# Patient Record
Sex: FEMALE | Race: BLACK OR AFRICAN AMERICAN | Employment: FULL TIME | URBAN - METROPOLITAN AREA
[De-identification: names, ages, dates, MRNs, and addresses within clinical notes are randomized per-mention and may not be internally consistent; named-entity substitution may affect disease eponyms.]

---

## 2017-03-17 DIAGNOSIS — N92.6 IRREGULAR MENSES: ICD-10-CM

## 2017-03-20 RX ORDER — ETHINYL ESTRADIOL AND LEVONORGESTREL 150-30(84)
KIT ORAL
Qty: 91 TABLET | Refills: 0 | OUTPATIENT
Start: 2017-03-20

## 2018-11-06 ENCOUNTER — TELEPHONE (OUTPATIENT)
Dept: INTERNAL MEDICINE CLINIC | Age: 38
End: 2018-11-06

## 2018-11-07 ENCOUNTER — TELEPHONE (OUTPATIENT)
Dept: INTERNAL MEDICINE CLINIC | Age: 38
End: 2018-11-07

## 2018-11-08 ENCOUNTER — OFFICE VISIT (OUTPATIENT)
Dept: INTERNAL MEDICINE CLINIC | Age: 38
End: 2018-11-08
Payer: COMMERCIAL

## 2018-11-08 VITALS
TEMPERATURE: 98.3 F | BODY MASS INDEX: 30.7 KG/M2 | DIASTOLIC BLOOD PRESSURE: 76 MMHG | OXYGEN SATURATION: 99 % | WEIGHT: 162.6 LBS | HEIGHT: 61 IN | HEART RATE: 77 BPM | SYSTOLIC BLOOD PRESSURE: 128 MMHG

## 2018-11-08 DIAGNOSIS — E78.5 DYSLIPIDEMIA: ICD-10-CM

## 2018-11-08 DIAGNOSIS — N91.2 AMENORRHEA: ICD-10-CM

## 2018-11-08 DIAGNOSIS — J45.20 MILD INTERMITTENT ASTHMA WITHOUT COMPLICATION: ICD-10-CM

## 2018-11-08 DIAGNOSIS — E55.9 VITAMIN D DEFICIENCY: ICD-10-CM

## 2018-11-08 DIAGNOSIS — R07.89 OTHER CHEST PAIN: Primary | ICD-10-CM

## 2018-11-08 DIAGNOSIS — I10 ESSENTIAL HYPERTENSION: ICD-10-CM

## 2018-11-08 DIAGNOSIS — Z34.90 PREGNANCY, UNSPECIFIED GESTATIONAL AGE: ICD-10-CM

## 2018-11-08 DIAGNOSIS — J30.89 OTHER ALLERGIC RHINITIS: ICD-10-CM

## 2018-11-08 DIAGNOSIS — G44.89 OTHER HEADACHE SYNDROME: ICD-10-CM

## 2018-11-08 DIAGNOSIS — R53.83 OTHER FATIGUE: ICD-10-CM

## 2018-11-08 LAB
A/G RATIO: 1.9 (ref 1.1–2.2)
ALBUMIN SERPL-MCNC: 4.6 G/DL (ref 3.4–5)
ALP BLD-CCNC: 44 U/L (ref 40–129)
ALT SERPL-CCNC: 10 U/L (ref 10–40)
ANION GAP SERPL CALCULATED.3IONS-SCNC: 11 MMOL/L (ref 3–16)
AST SERPL-CCNC: 12 U/L (ref 15–37)
BASOPHILS ABSOLUTE: 0 K/UL (ref 0–0.2)
BASOPHILS RELATIVE PERCENT: 0.4 %
BILIRUB SERPL-MCNC: 0.3 MG/DL (ref 0–1)
BILIRUBIN, POC: NORMAL
BLOOD URINE, POC: NORMAL
BUN BLDV-MCNC: 5 MG/DL (ref 7–20)
CALCIUM SERPL-MCNC: 9.7 MG/DL (ref 8.3–10.6)
CHLORIDE BLD-SCNC: 109 MMOL/L (ref 99–110)
CHOLESTEROL, TOTAL: 160 MG/DL (ref 0–199)
CLARITY, POC: NORMAL
CO2: 19 MMOL/L (ref 21–32)
COLOR, POC: YELLOW
CONTROL: NORMAL
CREAT SERPL-MCNC: <0.5 MG/DL (ref 0.6–1.1)
CREATININE URINE: 125.2 MG/DL (ref 28–259)
EOSINOPHILS ABSOLUTE: 0.2 K/UL (ref 0–0.6)
EOSINOPHILS RELATIVE PERCENT: 1.6 %
GFR AFRICAN AMERICAN: >60
GFR NON-AFRICAN AMERICAN: >60
GLOBULIN: 2.4 G/DL
GLUCOSE BLD-MCNC: 80 MG/DL (ref 70–99)
GLUCOSE URINE, POC: NORMAL
GONADOTROPIN, CHORIONIC (HCG) QUANT: NORMAL MIU/ML
HCT VFR BLD CALC: 37.3 % (ref 36–48)
HDLC SERPL-MCNC: 49 MG/DL (ref 40–60)
HEMOGLOBIN: 12.6 G/DL (ref 12–16)
KETONES, POC: NORMAL
LDL CHOLESTEROL CALCULATED: 101 MG/DL
LEUKOCYTE EST, POC: NORMAL
LYMPHOCYTES ABSOLUTE: 2.3 K/UL (ref 1–5.1)
LYMPHOCYTES RELATIVE PERCENT: 21.9 %
MCH RBC QN AUTO: 32.7 PG (ref 26–34)
MCHC RBC AUTO-ENTMCNC: 33.9 G/DL (ref 31–36)
MCV RBC AUTO: 96.3 FL (ref 80–100)
MICROALBUMIN UR-MCNC: <1.2 MG/DL
MICROALBUMIN/CREAT UR-RTO: NORMAL MG/G (ref 0–30)
MONOCYTES ABSOLUTE: 0.9 K/UL (ref 0–1.3)
MONOCYTES RELATIVE PERCENT: 8.6 %
NEUTROPHILS ABSOLUTE: 7.2 K/UL (ref 1.7–7.7)
NEUTROPHILS RELATIVE PERCENT: 67.5 %
NITRITE, POC: NORMAL
PDW BLD-RTO: 13.2 % (ref 12.4–15.4)
PH, POC: 7
PLATELET # BLD: 424 K/UL (ref 135–450)
PMV BLD AUTO: 7.2 FL (ref 5–10.5)
POTASSIUM SERPL-SCNC: 4 MMOL/L (ref 3.5–5.1)
PREGNANCY TEST URINE, POC: POSITIVE
PROTEIN, POC: NORMAL
RBC # BLD: 3.87 M/UL (ref 4–5.2)
SODIUM BLD-SCNC: 139 MMOL/L (ref 136–145)
SPECIFIC GRAVITY, POC: 1.01
TOTAL PROTEIN: 7 G/DL (ref 6.4–8.2)
TRIGL SERPL-MCNC: 50 MG/DL (ref 0–150)
TSH REFLEX: 0.32 UIU/ML (ref 0.27–4.2)
UROBILINOGEN, POC: 0.2
VLDLC SERPL CALC-MCNC: 10 MG/DL
WBC # BLD: 10.7 K/UL (ref 4–11)

## 2018-11-08 PROCEDURE — 81025 URINE PREGNANCY TEST: CPT | Performed by: INTERNAL MEDICINE

## 2018-11-08 PROCEDURE — 93000 ELECTROCARDIOGRAM COMPLETE: CPT | Performed by: INTERNAL MEDICINE

## 2018-11-08 PROCEDURE — 81002 URINALYSIS NONAUTO W/O SCOPE: CPT | Performed by: INTERNAL MEDICINE

## 2018-11-08 PROCEDURE — 99215 OFFICE O/P EST HI 40 MIN: CPT | Performed by: INTERNAL MEDICINE

## 2018-11-08 RX ORDER — TETANUS AND DIPHTHERIA TOXOIDS ADSORBED 2; 2 [LF]/.5ML; [LF]/.5ML
0.5 INJECTION INTRAMUSCULAR ONCE
Qty: 0.5 ML | Refills: 0 | Status: CANCELLED | OUTPATIENT
Start: 2018-11-08 | End: 2018-11-08

## 2018-11-08 ASSESSMENT — PATIENT HEALTH QUESTIONNAIRE - PHQ9
SUM OF ALL RESPONSES TO PHQ QUESTIONS 1-9: 0
SUM OF ALL RESPONSES TO PHQ9 QUESTIONS 1 & 2: 0
1. LITTLE INTEREST OR PLEASURE IN DOING THINGS: 0
2. FEELING DOWN, DEPRESSED OR HOPELESS: 0
SUM OF ALL RESPONSES TO PHQ QUESTIONS 1-9: 0

## 2018-11-09 LAB
FOLATE: 17.1 NG/ML (ref 4.78–24.2)
VITAMIN B-12: 579 PG/ML (ref 211–911)
VITAMIN D 25-HYDROXY: 14 NG/ML

## 2018-11-13 ENCOUNTER — TELEPHONE (OUTPATIENT)
Dept: OBGYN CLINIC | Age: 38
End: 2018-11-13

## 2018-11-13 ENCOUNTER — OFFICE VISIT (OUTPATIENT)
Dept: OBGYN CLINIC | Age: 38
End: 2018-11-13
Payer: COMMERCIAL

## 2018-11-13 VITALS
BODY MASS INDEX: 30.09 KG/M2 | HEIGHT: 61 IN | SYSTOLIC BLOOD PRESSURE: 128 MMHG | TEMPERATURE: 97.8 F | HEART RATE: 74 BPM | WEIGHT: 159.4 LBS | DIASTOLIC BLOOD PRESSURE: 74 MMHG

## 2018-11-13 DIAGNOSIS — I10 HYPERTENSION, UNSPECIFIED TYPE: ICD-10-CM

## 2018-11-13 DIAGNOSIS — J45.20 MILD INTERMITTENT ASTHMA WITHOUT COMPLICATION: ICD-10-CM

## 2018-11-13 DIAGNOSIS — Z12.4 PAP SMEAR FOR CERVICAL CANCER SCREENING: ICD-10-CM

## 2018-11-13 DIAGNOSIS — N91.2 AMENORRHEA: Primary | ICD-10-CM

## 2018-11-13 DIAGNOSIS — Z01.419 WOMEN'S ANNUAL ROUTINE GYNECOLOGICAL EXAMINATION: Primary | ICD-10-CM

## 2018-11-13 DIAGNOSIS — N91.2 AMENORRHEA: ICD-10-CM

## 2018-11-13 LAB
BILIRUBIN URINE: NEGATIVE
BLOOD, URINE: NEGATIVE
CLARITY: CLEAR
COLOR: YELLOW
CONTROL: NORMAL
CRL: NORMAL CM
EPITHELIAL CELLS, UA: 2 /HPF (ref 0–5)
GLUCOSE URINE: NEGATIVE MG/DL
HYALINE CASTS: 2 /LPF (ref 0–8)
KETONES, URINE: NEGATIVE MG/DL
LEUKOCYTE ESTERASE, URINE: NEGATIVE
MICROSCOPIC EXAMINATION: NORMAL
NITRITE, URINE: NEGATIVE
PH UA: 7
PREGNANCY TEST URINE, POC: POSITIVE
PROTEIN UA: NEGATIVE MG/DL
RBC UA: 3 /HPF (ref 0–4)
SAC DIAMETER: NORMAL CM
SPECIFIC GRAVITY UA: 1.01
UROBILINOGEN, URINE: 1 E.U./DL
WBC UA: 1 /HPF (ref 0–5)

## 2018-11-13 PROCEDURE — 81001 URINALYSIS AUTO W/SCOPE: CPT | Performed by: OBSTETRICS & GYNECOLOGY

## 2018-11-13 PROCEDURE — 76801 OB US < 14 WKS SINGLE FETUS: CPT | Performed by: OBSTETRICS & GYNECOLOGY

## 2018-11-13 PROCEDURE — 81025 URINE PREGNANCY TEST: CPT | Performed by: OBSTETRICS & GYNECOLOGY

## 2018-11-13 PROCEDURE — 99395 PREV VISIT EST AGE 18-39: CPT | Performed by: OBSTETRICS & GYNECOLOGY

## 2018-11-13 RX ORDER — PNV NO.95/FERROUS FUM/FOLIC AC 28MG-0.8MG
1 TABLET ORAL DAILY
Qty: 90 CAPSULE | Refills: 3 | Status: CANCELLED | OUTPATIENT
Start: 2018-11-13

## 2018-11-14 LAB
CANDIDA SPECIES, DNA PROBE: ABNORMAL
GARDNERELLA VAGINALIS, DNA PROBE: ABNORMAL
TRICHOMONAS VAGINALIS DNA: ABNORMAL

## 2018-11-15 LAB
C TRACH DNA GENITAL QL NAA+PROBE: NEGATIVE
HPV COMMENT: NORMAL
HPV TYPE 16: NOT DETECTED
HPV TYPE 18: NOT DETECTED
HPVOH (OTHER TYPES): NOT DETECTED
N. GONORRHOEAE DNA: NEGATIVE
URINE CULTURE, ROUTINE: NORMAL

## 2018-11-18 RX ORDER — PNV NO.95/FERROUS FUM/FOLIC AC 28MG-0.8MG
1 TABLET ORAL DAILY
Qty: 90 CAPSULE | Refills: 3 | Status: SHIPPED | OUTPATIENT
Start: 2018-11-18 | End: 2019-09-04 | Stop reason: ALTCHOICE

## 2018-11-18 ASSESSMENT — ENCOUNTER SYMPTOMS
SHORTNESS OF BREATH: 0
CONSTIPATION: 0
NAUSEA: 1
ABDOMINAL DISTENTION: 0
DIARRHEA: 0
VOMITING: 0
ABDOMINAL PAIN: 0

## 2018-11-19 ENCOUNTER — TELEPHONE (OUTPATIENT)
Dept: OBGYN CLINIC | Age: 38
End: 2018-11-19

## 2018-11-19 DIAGNOSIS — I10 HYPERTENSION, UNSPECIFIED TYPE: Primary | ICD-10-CM

## 2018-11-19 RX ORDER — BLOOD PRESSURE TEST KIT
1 KIT MISCELLANEOUS 2 TIMES DAILY
Qty: 1 KIT | Refills: 0 | Status: ON HOLD | OUTPATIENT
Start: 2018-11-19 | End: 2019-06-03

## 2018-12-04 RX ORDER — METHYLDOPA 500 MG/1
500 TABLET, FILM COATED ORAL 2 TIMES DAILY
Qty: 60 TABLET | Refills: 1 | Status: SHIPPED | OUTPATIENT
Start: 2018-12-04 | End: 2019-04-15

## 2018-12-04 NOTE — TELEPHONE ENCOUNTER
ALDOMET IS APPROPRIATE IN VIEW OF SAFETY PROFILE IN PREGNANCY    ADVISE LOW SODIUM / DASH DIET IF OK WITH YOU (OB)  WILL HAVE PT CONTINUE MGT WITH OB DURING PREGNANCY AND POSTPARTUM

## 2018-12-11 ENCOUNTER — INITIAL PRENATAL (OUTPATIENT)
Dept: OBGYN CLINIC | Age: 38
End: 2018-12-11
Payer: COMMERCIAL

## 2018-12-11 VITALS
WEIGHT: 162.6 LBS | BODY MASS INDEX: 30.72 KG/M2 | SYSTOLIC BLOOD PRESSURE: 120 MMHG | HEART RATE: 82 BPM | DIASTOLIC BLOOD PRESSURE: 72 MMHG

## 2018-12-11 DIAGNOSIS — O10.919 PRE-EXISTING HYPERTENSION DURING PREGNANCY, ANTEPARTUM, UNSPECIFIED PRE-EXISTING HYPERTENSION TYPE: ICD-10-CM

## 2018-12-11 DIAGNOSIS — O09.511 ELDERLY PRIMIGRAVIDA IN FIRST TRIMESTER: ICD-10-CM

## 2018-12-11 DIAGNOSIS — I10 ESSENTIAL HYPERTENSION: ICD-10-CM

## 2018-12-11 DIAGNOSIS — J45.20 MILD INTERMITTENT ASTHMA WITHOUT COMPLICATION: ICD-10-CM

## 2018-12-11 DIAGNOSIS — Z34.01 ENCOUNTER FOR PRENATAL CARE IN FIRST TRIMESTER OF FIRST PREGNANCY: Primary | ICD-10-CM

## 2018-12-11 LAB
A/G RATIO: 1.5 (ref 1.1–2.2)
ABO/RH: NORMAL
ALBUMIN SERPL-MCNC: 4.1 G/DL (ref 3.4–5)
ALP BLD-CCNC: 40 U/L (ref 40–129)
ALT SERPL-CCNC: 10 U/L (ref 10–40)
AMPHETAMINE SCREEN, URINE: ABNORMAL
ANION GAP SERPL CALCULATED.3IONS-SCNC: 15 MMOL/L (ref 3–16)
ANTIBODY SCREEN: NORMAL
AST SERPL-CCNC: 13 U/L (ref 15–37)
BARBITURATE SCREEN URINE: ABNORMAL
BASOPHILS ABSOLUTE: 0 K/UL (ref 0–0.2)
BASOPHILS RELATIVE PERCENT: 0.2 %
BENZODIAZEPINE SCREEN, URINE: ABNORMAL
BILIRUB SERPL-MCNC: <0.2 MG/DL (ref 0–1)
BUN BLDV-MCNC: 6 MG/DL (ref 7–20)
BUPRENORPHINE URINE: ABNORMAL
CALCIUM SERPL-MCNC: 9.7 MG/DL (ref 8.3–10.6)
CANNABINOID SCREEN URINE: POSITIVE
CHLORIDE BLD-SCNC: 104 MMOL/L (ref 99–110)
CO2: 19 MMOL/L (ref 21–32)
COCAINE METABOLITE SCREEN URINE: ABNORMAL
CREAT SERPL-MCNC: <0.5 MG/DL (ref 0.6–1.1)
EOSINOPHILS ABSOLUTE: 0.2 K/UL (ref 0–0.6)
EOSINOPHILS RELATIVE PERCENT: 1.6 %
GFR AFRICAN AMERICAN: >60
GFR NON-AFRICAN AMERICAN: >60
GLOBULIN: 2.8 G/DL
GLUCOSE BLD-MCNC: 77 MG/DL (ref 70–99)
HCT VFR BLD CALC: 35 % (ref 36–48)
HEMOGLOBIN: 11.7 G/DL (ref 12–16)
LYMPHOCYTES ABSOLUTE: 2.1 K/UL (ref 1–5.1)
LYMPHOCYTES RELATIVE PERCENT: 22.4 %
Lab: ABNORMAL
MCH RBC QN AUTO: 32.4 PG (ref 26–34)
MCHC RBC AUTO-ENTMCNC: 33.6 G/DL (ref 31–36)
MCV RBC AUTO: 96.4 FL (ref 80–100)
METHADONE SCREEN, URINE: ABNORMAL
MONOCYTES ABSOLUTE: 0.5 K/UL (ref 0–1.3)
MONOCYTES RELATIVE PERCENT: 5.7 %
NEUTROPHILS ABSOLUTE: 6.7 K/UL (ref 1.7–7.7)
NEUTROPHILS RELATIVE PERCENT: 70.1 %
OPIATE SCREEN URINE: ABNORMAL
OXYCODONE URINE: ABNORMAL
PDW BLD-RTO: 12.7 % (ref 12.4–15.4)
PH UA: 6.5
PHENCYCLIDINE SCREEN URINE: ABNORMAL
PLATELET # BLD: 386 K/UL (ref 135–450)
PMV BLD AUTO: 7.1 FL (ref 5–10.5)
POTASSIUM SERPL-SCNC: 3.9 MMOL/L (ref 3.5–5.1)
PROPOXYPHENE SCREEN: ABNORMAL
RBC # BLD: 3.63 M/UL (ref 4–5.2)
SODIUM BLD-SCNC: 138 MMOL/L (ref 136–145)
TOTAL PROTEIN: 6.9 G/DL (ref 6.4–8.2)
WBC # BLD: 9.5 K/UL (ref 4–11)

## 2018-12-11 PROCEDURE — 0501F PRENATAL FLOW SHEET: CPT | Performed by: OBSTETRICS & GYNECOLOGY

## 2018-12-11 PROCEDURE — 36415 COLL VENOUS BLD VENIPUNCTURE: CPT | Performed by: OBSTETRICS & GYNECOLOGY

## 2018-12-12 LAB
HEPATITIS B SURFACE ANTIGEN INTERPRETATION: NORMAL
HIV AG/AB: NORMAL
HIV ANTIGEN: NORMAL
HIV-1 ANTIBODY: NORMAL
HIV-2 AB: NORMAL
RUBELLA ANTIBODY IGG: 41.5 IU/ML
SICKLE CELL SCREEN: NEGATIVE
T3 TOTAL: 1.26 NG/ML (ref 0.8–2)
T4 FREE: 1.2 NG/DL (ref 0.9–1.8)
TOTAL SYPHILLIS IGG/IGM: NORMAL
TSH REFLEX: 0.19 UIU/ML (ref 0.27–4.2)

## 2018-12-17 DIAGNOSIS — Z34.01 ENCOUNTER FOR PRENATAL CARE IN FIRST TRIMESTER OF FIRST PREGNANCY: ICD-10-CM

## 2018-12-17 DIAGNOSIS — I10 ESSENTIAL HYPERTENSION: ICD-10-CM

## 2018-12-17 PROBLEM — O10.919 PRE-EXISTING HYPERTENSION DURING PREGNANCY, ANTEPARTUM: Status: ACTIVE | Noted: 2018-12-17

## 2018-12-17 PROBLEM — O09.511 ELDERLY PRIMIGRAVIDA IN FIRST TRIMESTER: Status: ACTIVE | Noted: 2018-12-17

## 2018-12-18 LAB
ALT SERPL-CCNC: 8 U/L (ref 10–40)
AST SERPL-CCNC: 10 U/L (ref 15–37)
BASOPHILS ABSOLUTE: 0 K/UL (ref 0–0.2)
BASOPHILS RELATIVE PERCENT: 0.4 %
BUN BLDV-MCNC: 6 MG/DL (ref 7–20)
CHLORIDE BLD-SCNC: 102 MMOL/L (ref 99–110)
CO2: 18 MMOL/L (ref 21–32)
CREAT SERPL-MCNC: 0.5 MG/DL (ref 0.6–1.2)
EOSINOPHILS ABSOLUTE: 0.3 K/UL (ref 0–0.6)
EOSINOPHILS RELATIVE PERCENT: 2.5 %
HCT VFR BLD CALC: 36 % (ref 36–48)
HEMOGLOBIN: 12 G/DL (ref 12–16)
LYMPHOCYTES ABSOLUTE: 2.3 K/UL (ref 1–5.1)
LYMPHOCYTES RELATIVE PERCENT: 20.9 %
MCH RBC QN AUTO: 31.9 PG (ref 26–34)
MCHC RBC AUTO-ENTMCNC: 33.3 G/DL (ref 31–36)
MCV RBC AUTO: 95.9 FL (ref 80–100)
MONOCYTES ABSOLUTE: 0.6 K/UL (ref 0–1.3)
MONOCYTES RELATIVE PERCENT: 5.5 %
NEUTROPHILS ABSOLUTE: 7.8 K/UL (ref 1.7–7.7)
NEUTROPHILS RELATIVE PERCENT: 70.7 %
PDW BLD-RTO: 12.8 % (ref 12.4–15.4)
PLATELET # BLD: 394 K/UL (ref 135–450)
PMV BLD AUTO: 7.1 FL (ref 5–10.5)
POTASSIUM SERPL-SCNC: 3.8 MMOL/L (ref 3.5–5.1)
RBC # BLD: 3.75 M/UL (ref 4–5.2)
SODIUM BLD-SCNC: 137 MMOL/L (ref 136–145)
WBC # BLD: 11 K/UL (ref 4–11)

## 2018-12-18 NOTE — PROGRESS NOTES
No vaginal bleeding, no LOF, no vaginal discharge, no pelvic pain, +FM  Patient started on Aldomet 500 mg Q 12 hours since last visit. Checking blood pressures twice daily. 110's-130's/70's-80's  Blood pressures have improved since starting Aldomet. Denies headache, vision changes and RUQ pain. Would like to proceed with free fetal DNA testing. Risks and benefits discussed. Diagnosis Orders   1. Encounter for prenatal care in first trimester of first pregnancy  Drug Screen Multi Urine With Bup    Type and Screen    CBC Auto Differential    Rubella antibody, IgG    Syphilis Antibody Cascading Reflex    Hepatitis B Surface Antigen    TSH with Reflex    HIV Screen    COMPREHENSIVE METABOLIC PANEL    ALT    BUN    AST    CBC Auto Differential    Chloride    CO2, Total    Creatinine Serum for Clearance    Potassium    Protein, 24 Hr Urine    Sodium    Sickle cell screen   2. Essential hypertension  CBC Auto Differential    COMPREHENSIVE METABOLIC PANEL    ALT    BUN    AST    CBC Auto Differential    Chloride    CO2, Total    Creatinine Serum for Clearance    Potassium    Protein, 24 Hr Urine    Sodium   3. Pre-existing hypertension during pregnancy, antepartum, unspecified pre-existing hypertension type     4. Elderly primigravida in first trimester     5. Mild intermittent asthma without complication       Orders Placed This Encounter   Procedures    Drug Screen Multi Urine With Bup    CBC Auto Differential    Rubella antibody, IgG    Syphilis Antibody Cascading Reflex    Hepatitis B Surface Antigen    TSH with Reflex    HIV Screen    COMPREHENSIVE METABOLIC PANEL    ALT    BUN    AST    CBC Auto Differential    Chloride    CO2, Total    Creatinine Serum for Clearance    Potassium    Protein, 24 Hr Urine    Sodium    Sickle cell screen    T4, Free    T3    Type and Screen     EKG result from 11/8/18 reviewed. Baseline Pre-eclampsia labs, 24 hour urine.     Continue Aldomet  Start 81

## 2018-12-21 ENCOUNTER — TELEPHONE (OUTPATIENT)
Dept: OBGYN CLINIC | Age: 38
End: 2018-12-21

## 2018-12-21 DIAGNOSIS — Z34.01 ENCOUNTER FOR PRENATAL CARE IN FIRST TRIMESTER OF FIRST PREGNANCY: Primary | ICD-10-CM

## 2018-12-21 DIAGNOSIS — O10.919 PRE-EXISTING HYPERTENSION DURING PREGNANCY, ANTEPARTUM, UNSPECIFIED PRE-EXISTING HYPERTENSION TYPE: ICD-10-CM

## 2018-12-21 LAB
24HR URINE VOLUME (ML): 1800 ML
CREAT SERPL-MCNC: 0.5 MG/DL (ref 0.6–1.2)
CREATININE 24 HOUR URINE: 1 G/24HR (ref 0.6–1.5)
CREATININE CLEARANCE: 142 ML/MIN (ref 88–128)
Lab: 24 HOURS
Lab: 24 HR
PROTEIN 24 HOUR URINE: 0.16 G/24HR

## 2019-01-23 ENCOUNTER — ROUTINE PRENATAL (OUTPATIENT)
Dept: OBGYN CLINIC | Age: 39
End: 2019-01-23
Payer: COMMERCIAL

## 2019-01-23 VITALS
BODY MASS INDEX: 32.35 KG/M2 | HEART RATE: 87 BPM | WEIGHT: 171.2 LBS | SYSTOLIC BLOOD PRESSURE: 120 MMHG | DIASTOLIC BLOOD PRESSURE: 76 MMHG

## 2019-01-23 DIAGNOSIS — J45.20 MILD INTERMITTENT ASTHMA WITHOUT COMPLICATION: ICD-10-CM

## 2019-01-23 DIAGNOSIS — R81 GLUCOSURIA: ICD-10-CM

## 2019-01-23 DIAGNOSIS — Z34.02 ENCOUNTER FOR PRENATAL CARE IN SECOND TRIMESTER OF FIRST PREGNANCY: Primary | ICD-10-CM

## 2019-01-23 DIAGNOSIS — F12.91 HISTORY OF MARIJUANA USE: ICD-10-CM

## 2019-01-23 DIAGNOSIS — O10.919 PRE-EXISTING HYPERTENSION DURING PREGNANCY, ANTEPARTUM, UNSPECIFIED PRE-EXISTING HYPERTENSION TYPE: ICD-10-CM

## 2019-01-23 DIAGNOSIS — O09.512 ELDERLY PRIMIGRAVIDA IN SECOND TRIMESTER: ICD-10-CM

## 2019-01-23 PROCEDURE — 0502F SUBSEQUENT PRENATAL CARE: CPT | Performed by: OBSTETRICS & GYNECOLOGY

## 2019-01-23 PROCEDURE — 36415 COLL VENOUS BLD VENIPUNCTURE: CPT | Performed by: OBSTETRICS & GYNECOLOGY

## 2019-01-23 RX ORDER — METHYLDOPA 500 MG/1
500 TABLET, FILM COATED ORAL 2 TIMES DAILY
Qty: 60 TABLET | Refills: 3 | Status: SHIPPED | OUTPATIENT
Start: 2019-01-23 | End: 2019-05-25 | Stop reason: SDUPTHER

## 2019-01-24 LAB
BASOPHILS ABSOLUTE: 0 K/UL (ref 0–0.2)
BASOPHILS RELATIVE PERCENT: 0.3 %
EOSINOPHILS ABSOLUTE: 0.2 K/UL (ref 0–0.6)
EOSINOPHILS RELATIVE PERCENT: 2.5 %
ESTIMATED AVERAGE GLUCOSE: 111.2 MG/DL
HBA1C MFR BLD: 5.5 %
HCT VFR BLD CALC: 34.4 % (ref 36–48)
HEMOGLOBIN: 11.6 G/DL (ref 12–16)
LYMPHOCYTES ABSOLUTE: 1.5 K/UL (ref 1–5.1)
LYMPHOCYTES RELATIVE PERCENT: 16.8 %
MCH RBC QN AUTO: 32.6 PG (ref 26–34)
MCHC RBC AUTO-ENTMCNC: 33.7 G/DL (ref 31–36)
MCV RBC AUTO: 96.7 FL (ref 80–100)
MONOCYTES ABSOLUTE: 0.7 K/UL (ref 0–1.3)
MONOCYTES RELATIVE PERCENT: 7.6 %
NEUTROPHILS ABSOLUTE: 6.4 K/UL (ref 1.7–7.7)
NEUTROPHILS RELATIVE PERCENT: 72.8 %
PDW BLD-RTO: 13.3 % (ref 12.4–15.4)
PLATELET # BLD: 410 K/UL (ref 135–450)
PMV BLD AUTO: 6.9 FL (ref 5–10.5)
RBC # BLD: 3.56 M/UL (ref 4–5.2)
WBC # BLD: 8.8 K/UL (ref 4–11)

## 2019-02-04 ENCOUNTER — ROUTINE PRENATAL (OUTPATIENT)
Dept: PERINATAL CARE | Age: 39
End: 2019-02-04
Payer: COMMERCIAL

## 2019-02-04 VITALS
HEART RATE: 82 BPM | SYSTOLIC BLOOD PRESSURE: 122 MMHG | DIASTOLIC BLOOD PRESSURE: 77 MMHG | WEIGHT: 172 LBS | BODY MASS INDEX: 32.5 KG/M2

## 2019-02-04 DIAGNOSIS — O09.512 ELDERLY PRIMIGRAVIDA IN SECOND TRIMESTER: Primary | ICD-10-CM

## 2019-02-04 DIAGNOSIS — O16.2 HYPERTENSION AFFECTING PREGNANCY IN SECOND TRIMESTER: ICD-10-CM

## 2019-02-04 PROCEDURE — 76811 OB US DETAILED SNGL FETUS: CPT

## 2019-02-12 ENCOUNTER — ROUTINE PRENATAL (OUTPATIENT)
Dept: OBGYN CLINIC | Age: 39
End: 2019-02-12

## 2019-02-12 VITALS
HEART RATE: 83 BPM | DIASTOLIC BLOOD PRESSURE: 64 MMHG | BODY MASS INDEX: 32.39 KG/M2 | WEIGHT: 171.4 LBS | SYSTOLIC BLOOD PRESSURE: 120 MMHG

## 2019-02-12 DIAGNOSIS — I10 HYPERTENSION, UNSPECIFIED TYPE: ICD-10-CM

## 2019-02-12 DIAGNOSIS — J45.20 MILD INTERMITTENT ASTHMA WITHOUT COMPLICATION: ICD-10-CM

## 2019-02-12 DIAGNOSIS — O10.919 PRE-EXISTING HYPERTENSION DURING PREGNANCY, ANTEPARTUM, UNSPECIFIED PRE-EXISTING HYPERTENSION TYPE: ICD-10-CM

## 2019-02-12 DIAGNOSIS — O09.512 ELDERLY PRIMIGRAVIDA IN SECOND TRIMESTER: ICD-10-CM

## 2019-02-12 DIAGNOSIS — F12.91 HISTORY OF MARIJUANA USE: ICD-10-CM

## 2019-02-12 DIAGNOSIS — Z34.02 ENCOUNTER FOR PRENATAL CARE IN SECOND TRIMESTER OF FIRST PREGNANCY: Primary | ICD-10-CM

## 2019-02-12 DIAGNOSIS — O99.012 ANEMIA DURING PREGNANCY IN SECOND TRIMESTER: ICD-10-CM

## 2019-02-12 PROCEDURE — 0502F SUBSEQUENT PRENATAL CARE: CPT | Performed by: OBSTETRICS & GYNECOLOGY

## 2019-02-14 PROBLEM — O99.012 ANEMIA DURING PREGNANCY IN SECOND TRIMESTER: Status: ACTIVE | Noted: 2019-02-14

## 2019-02-19 ENCOUNTER — TELEPHONE (OUTPATIENT)
Dept: OBGYN CLINIC | Age: 39
End: 2019-02-19

## 2019-03-06 ENCOUNTER — ROUTINE PRENATAL (OUTPATIENT)
Dept: OBGYN CLINIC | Age: 39
End: 2019-03-06

## 2019-03-06 ENCOUNTER — OFFICE VISIT (OUTPATIENT)
Dept: OBGYN CLINIC | Age: 39
End: 2019-03-06
Payer: COMMERCIAL

## 2019-03-06 VITALS
BODY MASS INDEX: 33.1 KG/M2 | SYSTOLIC BLOOD PRESSURE: 122 MMHG | WEIGHT: 175.2 LBS | HEART RATE: 81 BPM | DIASTOLIC BLOOD PRESSURE: 64 MMHG

## 2019-03-06 DIAGNOSIS — O09.512 ELDERLY PRIMIGRAVIDA IN SECOND TRIMESTER: ICD-10-CM

## 2019-03-06 DIAGNOSIS — Z34.02 ENCOUNTER FOR PRENATAL CARE IN SECOND TRIMESTER OF FIRST PREGNANCY: Primary | ICD-10-CM

## 2019-03-06 DIAGNOSIS — O99.012 ANEMIA DURING PREGNANCY IN SECOND TRIMESTER: ICD-10-CM

## 2019-03-06 DIAGNOSIS — O10.919 PRE-EXISTING HYPERTENSION DURING PREGNANCY, ANTEPARTUM, UNSPECIFIED PRE-EXISTING HYPERTENSION TYPE: ICD-10-CM

## 2019-03-06 DIAGNOSIS — F12.91 HISTORY OF MARIJUANA USE: ICD-10-CM

## 2019-03-06 LAB
ABDOMINAL CIRCUMFERENCE: NORMAL CM
BIPARIETAL DIAMETER: NORMAL CM
ESTIMATED FETAL WEIGHT: NORMAL GRAMS
FEMORAL DIAMETER: NORMAL CM
HC/AC: NORMAL
HEAD CIRCUMFERENCE: NORMAL CM

## 2019-03-06 PROCEDURE — 0502F SUBSEQUENT PRENATAL CARE: CPT | Performed by: OBSTETRICS & GYNECOLOGY

## 2019-03-06 PROCEDURE — 76816 OB US FOLLOW-UP PER FETUS: CPT | Performed by: OBSTETRICS & GYNECOLOGY

## 2019-03-18 ENCOUNTER — ROUTINE PRENATAL (OUTPATIENT)
Dept: OBGYN CLINIC | Age: 39
End: 2019-03-18
Payer: COMMERCIAL

## 2019-03-18 VITALS
DIASTOLIC BLOOD PRESSURE: 68 MMHG | WEIGHT: 179.6 LBS | SYSTOLIC BLOOD PRESSURE: 110 MMHG | HEART RATE: 105 BPM | BODY MASS INDEX: 33.94 KG/M2

## 2019-03-18 DIAGNOSIS — Z34.02 ENCOUNTER FOR PRENATAL CARE IN SECOND TRIMESTER OF FIRST PREGNANCY: Primary | ICD-10-CM

## 2019-03-18 DIAGNOSIS — O10.919 PRE-EXISTING HYPERTENSION DURING PREGNANCY, ANTEPARTUM, UNSPECIFIED PRE-EXISTING HYPERTENSION TYPE: ICD-10-CM

## 2019-03-18 DIAGNOSIS — F12.91 HISTORY OF MARIJUANA USE: ICD-10-CM

## 2019-03-18 DIAGNOSIS — O99.012 ANEMIA DURING PREGNANCY IN SECOND TRIMESTER: ICD-10-CM

## 2019-03-18 DIAGNOSIS — O09.512 ELDERLY PRIMIGRAVIDA IN SECOND TRIMESTER: ICD-10-CM

## 2019-03-18 LAB
ALT SERPL-CCNC: 14 U/L (ref 10–40)
ANION GAP SERPL CALCULATED.3IONS-SCNC: 16 MMOL/L (ref 3–16)
AST SERPL-CCNC: 15 U/L (ref 15–37)
BASOPHILS ABSOLUTE: 0 K/UL (ref 0–0.2)
BASOPHILS RELATIVE PERCENT: 0.2 %
BUN BLDV-MCNC: 6 MG/DL (ref 7–20)
CALCIUM SERPL-MCNC: 9.3 MG/DL (ref 8.3–10.6)
CHLORIDE BLD-SCNC: 105 MMOL/L (ref 99–110)
CO2: 17 MMOL/L (ref 21–32)
CREAT SERPL-MCNC: <0.5 MG/DL (ref 0.6–1.1)
EOSINOPHILS ABSOLUTE: 0.2 K/UL (ref 0–0.6)
EOSINOPHILS RELATIVE PERCENT: 2.2 %
GFR AFRICAN AMERICAN: >60
GFR NON-AFRICAN AMERICAN: >60
GLUCOSE BLD-MCNC: 119 MG/DL (ref 70–99)
HCT VFR BLD CALC: 34.2 % (ref 36–48)
HEMOGLOBIN: 11.4 G/DL (ref 12–16)
LYMPHOCYTES ABSOLUTE: 1.6 K/UL (ref 1–5.1)
LYMPHOCYTES RELATIVE PERCENT: 16.8 %
MCH RBC QN AUTO: 32.5 PG (ref 26–34)
MCHC RBC AUTO-ENTMCNC: 33.3 G/DL (ref 31–36)
MCV RBC AUTO: 97.5 FL (ref 80–100)
MONOCYTES ABSOLUTE: 0.7 K/UL (ref 0–1.3)
MONOCYTES RELATIVE PERCENT: 7.1 %
NEUTROPHILS ABSOLUTE: 7.1 K/UL (ref 1.7–7.7)
NEUTROPHILS RELATIVE PERCENT: 73.7 %
PDW BLD-RTO: 13.5 % (ref 12.4–15.4)
PLATELET # BLD: 412 K/UL (ref 135–450)
PMV BLD AUTO: 6.9 FL (ref 5–10.5)
POTASSIUM SERPL-SCNC: 3.8 MMOL/L (ref 3.5–5.1)
RBC # BLD: 3.51 M/UL (ref 4–5.2)
SODIUM BLD-SCNC: 138 MMOL/L (ref 136–145)
URIC ACID, SERUM: 2.8 MG/DL (ref 2.6–6)
WBC # BLD: 9.6 K/UL (ref 4–11)

## 2019-03-18 PROCEDURE — 36415 COLL VENOUS BLD VENIPUNCTURE: CPT | Performed by: OBSTETRICS & GYNECOLOGY

## 2019-03-18 PROCEDURE — 0502F SUBSEQUENT PRENATAL CARE: CPT | Performed by: OBSTETRICS & GYNECOLOGY

## 2019-03-19 LAB
24HR URINE VOLUME (ML): 2450 ML
CREAT SERPL-MCNC: <0.5 MG/DL (ref 0.6–1.2)
CREATININE 24 HOUR URINE: 1.9 G/24HR (ref 0.6–1.5)
CREATININE CLEARANCE: 270 ML/MIN (ref 88–128)
GLUCOSE CHALLENGE: 119 MG/DL
Lab: 24 HR

## 2019-03-20 ENCOUNTER — TELEPHONE (OUTPATIENT)
Dept: OBGYN CLINIC | Age: 39
End: 2019-03-20

## 2019-03-20 DIAGNOSIS — O10.919 PRE-EXISTING HYPERTENSION DURING PREGNANCY, ANTEPARTUM, UNSPECIFIED PRE-EXISTING HYPERTENSION TYPE: Primary | ICD-10-CM

## 2019-03-21 LAB
24HR URINE VOLUME (ML): 2450 ML
CREATININE 24 HOUR URINE: 1.9 G/24HR (ref 0.6–1.5)
PROTEIN 24 HOUR URINE: 0.27 G/24HR

## 2019-04-01 ENCOUNTER — OFFICE VISIT (OUTPATIENT)
Dept: OBGYN CLINIC | Age: 39
End: 2019-04-01
Payer: COMMERCIAL

## 2019-04-01 ENCOUNTER — ROUTINE PRENATAL (OUTPATIENT)
Dept: OBGYN CLINIC | Age: 39
End: 2019-04-01

## 2019-04-01 VITALS
DIASTOLIC BLOOD PRESSURE: 64 MMHG | SYSTOLIC BLOOD PRESSURE: 120 MMHG | HEART RATE: 99 BPM | WEIGHT: 180 LBS | BODY MASS INDEX: 34.01 KG/M2

## 2019-04-01 DIAGNOSIS — O99.013 ANEMIA DURING PREGNANCY IN THIRD TRIMESTER: ICD-10-CM

## 2019-04-01 DIAGNOSIS — O10.919 PRE-EXISTING HYPERTENSION DURING PREGNANCY, ANTEPARTUM, UNSPECIFIED PRE-EXISTING HYPERTENSION TYPE: ICD-10-CM

## 2019-04-01 DIAGNOSIS — O09.513 ELDERLY PRIMIGRAVIDA IN THIRD TRIMESTER: ICD-10-CM

## 2019-04-01 DIAGNOSIS — Z34.03 ENCOUNTER FOR PRENATAL CARE IN THIRD TRIMESTER OF FIRST PREGNANCY: Primary | ICD-10-CM

## 2019-04-01 PROCEDURE — 76816 OB US FOLLOW-UP PER FETUS: CPT | Performed by: OBSTETRICS & GYNECOLOGY

## 2019-04-01 PROCEDURE — 0502F SUBSEQUENT PRENATAL CARE: CPT | Performed by: OBSTETRICS & GYNECOLOGY

## 2019-04-01 NOTE — PROGRESS NOTES
Temp: 98.1F    Maternal emotional well being screening form completed and reviewed with patient. Current score is 1. Patient given referral to [address] [place] [address] ([phone]):  No

## 2019-04-02 PROBLEM — O99.013 ANEMIA DURING PREGNANCY IN THIRD TRIMESTER: Status: ACTIVE | Noted: 2019-02-14

## 2019-04-02 PROBLEM — O09.513 ELDERLY PRIMIGRAVIDA IN THIRD TRIMESTER: Status: ACTIVE | Noted: 2018-12-17

## 2019-04-02 PROBLEM — Z34.03 ENCOUNTER FOR PRENATAL CARE IN THIRD TRIMESTER OF FIRST PREGNANCY: Status: ACTIVE | Noted: 2018-12-17

## 2019-04-02 PROBLEM — Z34.02 ENCOUNTER FOR PRENATAL CARE IN SECOND TRIMESTER OF FIRST PREGNANCY: Status: RESOLVED | Noted: 2018-12-17 | Resolved: 2019-04-02

## 2019-04-02 NOTE — PROGRESS NOTES
No vaginal bleeding, no LOF, no contractions, +FM  Headaches have improved. Denies vision changes and RUQ pain. BPs:  110's-130's/80's  Maternal Wellness Questionnaire reviewed--no concerns. OBSTETRIC ULTRASOUND GROWTH    DATE: 4/1/19    PHYSICIAN: AURELIO Mcmanus D.O.     SONOGRAPHER: JEAN-PAUL Walls [facility]    INDICATION: Growth, AMA, chronic HTN    COMPARISON: 3/6/19    TYPE OF SCAN: abdominal    FINDINGS:  A single viable intrauterine pregnancy is noted in cephalic presentation. Cardiac and somatic activity are noted. The following values were obtained:   Fetal heart rate    160 bpm   BPD      7.73cm  31 weeks 0 day(s)   Head Circumference    26.92cm  29 weeks 2 day(s)    Abdominal Circumference   23.8cm  28 weeks 1 day(s)   Femur Length     5.32cm  28 weeks 2 day(s)   Humerus Length    4.84cm  20 weeks 3 day(s)   Amniotic fluid index    9.68cm   EFW      1231g  45.1 percentile    Amniotic fluid volume is normal. Based on sonographic criteria the estimated fetal age is 29 weeks and 0 days with EDC of 6/17/19. There is a 1 day discordance with the established EDC of 6/18/19. The patient has a anterior placenta that is adequate distance in relation to the internal cervical os. The evaluation of the lower uterine segment and cervix reveals normal appearing anatomy. The uterus is unremarkable/gravid. Maternal ovaries and adnexae are not well visualized due to the size of the uterus and patient's gravid state. Anatomy seen includes: heart, stomach, kidneys, bladder    IMPRESSION:  Single live IUP in the 3rd trimester. Adequate interval fetal growth. Imaging is limited secondary to fetal position. The patient is well aware of the limitations of ultrasound in the detection of anomalies. Diagnosis Orders   1. Encounter for prenatal care in third trimester of first pregnancy     2. Elderly primigravida in third trimester     3.  Pre-existing hypertension during pregnancy, antepartum, unspecified pre-existing hypertension type     4.  Anemia during pregnancy in third trimester       Hydration with water  Labor precautions, kick counts  Follow up in 2 weeks for prenatal visit

## 2019-04-15 ENCOUNTER — ROUTINE PRENATAL (OUTPATIENT)
Dept: OBGYN CLINIC | Age: 39
End: 2019-04-15

## 2019-04-15 VITALS
WEIGHT: 182.2 LBS | BODY MASS INDEX: 34.43 KG/M2 | SYSTOLIC BLOOD PRESSURE: 130 MMHG | DIASTOLIC BLOOD PRESSURE: 70 MMHG | HEART RATE: 68 BPM

## 2019-04-15 DIAGNOSIS — J45.20 MILD INTERMITTENT ASTHMA WITHOUT COMPLICATION: ICD-10-CM

## 2019-04-15 DIAGNOSIS — O99.013 ANEMIA DURING PREGNANCY IN THIRD TRIMESTER: ICD-10-CM

## 2019-04-15 DIAGNOSIS — O09.513 ELDERLY PRIMIGRAVIDA IN THIRD TRIMESTER: ICD-10-CM

## 2019-04-15 DIAGNOSIS — Z34.03 ENCOUNTER FOR PRENATAL CARE IN THIRD TRIMESTER OF FIRST PREGNANCY: Primary | ICD-10-CM

## 2019-04-15 DIAGNOSIS — O10.919 PRE-EXISTING HYPERTENSION DURING PREGNANCY, ANTEPARTUM, UNSPECIFIED PRE-EXISTING HYPERTENSION TYPE: ICD-10-CM

## 2019-04-15 PROCEDURE — 0502F SUBSEQUENT PRENATAL CARE: CPT | Performed by: OBSTETRICS & GYNECOLOGY

## 2019-04-15 NOTE — PROGRESS NOTES
Temp:98.4F  Maternal emotional well being screening form completed and reviewed with patient. Current score is 1. Patient given referral to [address] [place] [address] ([phone]):  No

## 2019-04-30 ENCOUNTER — OFFICE VISIT (OUTPATIENT)
Dept: OBGYN CLINIC | Age: 39
End: 2019-04-30
Payer: COMMERCIAL

## 2019-04-30 ENCOUNTER — ROUTINE PRENATAL (OUTPATIENT)
Dept: OBGYN CLINIC | Age: 39
End: 2019-04-30
Payer: COMMERCIAL

## 2019-04-30 VITALS
SYSTOLIC BLOOD PRESSURE: 120 MMHG | BODY MASS INDEX: 34.46 KG/M2 | HEART RATE: 85 BPM | WEIGHT: 182.4 LBS | DIASTOLIC BLOOD PRESSURE: 68 MMHG

## 2019-04-30 DIAGNOSIS — Z34.03 ENCOUNTER FOR PRENATAL CARE IN THIRD TRIMESTER OF FIRST PREGNANCY: Primary | ICD-10-CM

## 2019-04-30 DIAGNOSIS — O09.513 ELDERLY PRIMIGRAVIDA IN THIRD TRIMESTER: ICD-10-CM

## 2019-04-30 DIAGNOSIS — O99.013 ANEMIA DURING PREGNANCY IN THIRD TRIMESTER: ICD-10-CM

## 2019-04-30 DIAGNOSIS — O10.919 PRE-EXISTING HYPERTENSION DURING PREGNANCY, ANTEPARTUM, UNSPECIFIED PRE-EXISTING HYPERTENSION TYPE: ICD-10-CM

## 2019-04-30 DIAGNOSIS — J45.20 MILD INTERMITTENT ASTHMA WITHOUT COMPLICATION: ICD-10-CM

## 2019-04-30 PROCEDURE — 76816 OB US FOLLOW-UP PER FETUS: CPT | Performed by: OBSTETRICS & GYNECOLOGY

## 2019-04-30 PROCEDURE — 59025 FETAL NON-STRESS TEST: CPT | Performed by: OBSTETRICS & GYNECOLOGY

## 2019-04-30 PROCEDURE — 0502F SUBSEQUENT PRENATAL CARE: CPT | Performed by: OBSTETRICS & GYNECOLOGY

## 2019-05-03 ENCOUNTER — ROUTINE PRENATAL (OUTPATIENT)
Dept: OBGYN CLINIC | Age: 39
End: 2019-05-03
Payer: COMMERCIAL

## 2019-05-03 VITALS
HEART RATE: 90 BPM | DIASTOLIC BLOOD PRESSURE: 62 MMHG | WEIGHT: 183.6 LBS | SYSTOLIC BLOOD PRESSURE: 124 MMHG | BODY MASS INDEX: 34.69 KG/M2

## 2019-05-03 DIAGNOSIS — O99.013 ANEMIA DURING PREGNANCY IN THIRD TRIMESTER: ICD-10-CM

## 2019-05-03 DIAGNOSIS — O10.919 PRE-EXISTING HYPERTENSION DURING PREGNANCY, ANTEPARTUM, UNSPECIFIED PRE-EXISTING HYPERTENSION TYPE: ICD-10-CM

## 2019-05-03 DIAGNOSIS — O09.513 ELDERLY PRIMIGRAVIDA IN THIRD TRIMESTER: ICD-10-CM

## 2019-05-03 DIAGNOSIS — J45.20 MILD INTERMITTENT ASTHMA WITHOUT COMPLICATION: ICD-10-CM

## 2019-05-03 DIAGNOSIS — Z34.03 ENCOUNTER FOR PRENATAL CARE IN THIRD TRIMESTER OF FIRST PREGNANCY: Primary | ICD-10-CM

## 2019-05-03 PROCEDURE — 59025 FETAL NON-STRESS TEST: CPT | Performed by: OBSTETRICS & GYNECOLOGY

## 2019-05-03 PROCEDURE — 0502F SUBSEQUENT PRENATAL CARE: CPT | Performed by: OBSTETRICS & GYNECOLOGY

## 2019-05-03 NOTE — PROGRESS NOTES
Temp: 97.9F    Maternal emotional well being screening form completed and reviewed with patient. Current score is 4. Patient given referral to [address] [place] [address] ([phone]):  No

## 2019-05-05 NOTE — PROGRESS NOTES
Temp: 98.0F  Maternal emotional well being screening form completed and reviewed with patient. Current score is 1. Patient given referral to [address] [place] [address] ([phone]):  No
anomalies. Diagnosis Orders   1. Encounter for prenatal care in third trimester of first pregnancy  31659 - [place] FETAL NON-STRESS TEST   2. Elderly primigravida in third trimester  40360 - [place] FETAL NON-STRESS TEST   3. Anemia during pregnancy in third trimester     4. Mild intermittent asthma without complication     5. Pre-existing hypertension during pregnancy, antepartum, unspecified pre-existing hypertension type  47876 - [place] FETAL NON-STRESS TEST     Orders Placed This Encounter   Procedures    04528 - [place] FETAL NON-STRESS TEST     Labor precautions, kick counts  Discontinue ASA at 34-36 weeks  Continue home blood pressure monitoring. Follow up in 3-5 days for prenatal testing.

## 2019-05-06 NOTE — PROGRESS NOTES
No vaginal bleeding, no LOF, no contractions, +FM  No complaints. No headaches, vision changes or right upper quadrant pain. Home blood pressures 130's/70's  Maternal Wellbeing Questionnaire reviewed--no concerns  NON STRESS TEST    DATE: 5/3/19    : Matilda Mcmanus D.O.    COMPARISON: 4/30/19    INDICATION: AMA, pre-existing hypertension    IMPRESSION:  Fetal heart tones: 145's,  NST category: 1, reactive,  Tocometry: no contractions. Diagnosis Orders   1. Encounter for prenatal care in third trimester of first pregnancy  26812 - [place] FETAL NON-STRESS TEST   2. Elderly primigravida in third trimester  21230 - [place] FETAL NON-STRESS TEST   3. Pre-existing hypertension during pregnancy, antepartum, unspecified pre-existing hypertension type  77164 - [place] FETAL NON-STRESS TEST   4. Mild intermittent asthma without complication     5. Anemia during pregnancy in third trimester       Orders Placed This Encounter   Procedures    65499 - [place] FETAL NON-STRESS TEST     Pre-eclampsia precautions  Follow up prn and in 3-5 days for prenatal visit and prenatal testing.

## 2019-05-07 ENCOUNTER — OFFICE VISIT (OUTPATIENT)
Dept: OBGYN CLINIC | Age: 39
End: 2019-05-07
Payer: COMMERCIAL

## 2019-05-07 ENCOUNTER — ROUTINE PRENATAL (OUTPATIENT)
Dept: OBGYN CLINIC | Age: 39
End: 2019-05-07
Payer: COMMERCIAL

## 2019-05-07 VITALS
WEIGHT: 186.8 LBS | SYSTOLIC BLOOD PRESSURE: 124 MMHG | DIASTOLIC BLOOD PRESSURE: 74 MMHG | HEART RATE: 101 BPM | BODY MASS INDEX: 35.3 KG/M2

## 2019-05-07 DIAGNOSIS — I10 HYPERTENSION, UNSPECIFIED TYPE: Primary | ICD-10-CM

## 2019-05-07 DIAGNOSIS — O99.013 ANEMIA DURING PREGNANCY IN THIRD TRIMESTER: ICD-10-CM

## 2019-05-07 DIAGNOSIS — Z34.03 ENCOUNTER FOR PRENATAL CARE IN THIRD TRIMESTER OF FIRST PREGNANCY: Primary | ICD-10-CM

## 2019-05-07 DIAGNOSIS — O09.513 ELDERLY PRIMIGRAVIDA IN THIRD TRIMESTER: ICD-10-CM

## 2019-05-07 DIAGNOSIS — O10.919 PRE-EXISTING HYPERTENSION DURING PREGNANCY, ANTEPARTUM, UNSPECIFIED PRE-EXISTING HYPERTENSION TYPE: ICD-10-CM

## 2019-05-07 DIAGNOSIS — N89.8 VAGINAL ITCHING: ICD-10-CM

## 2019-05-07 PROCEDURE — 76816 OB US FOLLOW-UP PER FETUS: CPT | Performed by: OBSTETRICS & GYNECOLOGY

## 2019-05-07 PROCEDURE — 0502F SUBSEQUENT PRENATAL CARE: CPT | Performed by: OBSTETRICS & GYNECOLOGY

## 2019-05-07 PROCEDURE — 59025 FETAL NON-STRESS TEST: CPT | Performed by: OBSTETRICS & GYNECOLOGY

## 2019-05-07 NOTE — PROGRESS NOTES
Temp: 97.8   Maternal emotional well being screening form completed and reviewed with patient. Current score is 1. Patient given referral to [address] [place] [address] ([phone]):  No

## 2019-05-07 NOTE — PROGRESS NOTES
Return OB Office Visit    CC:   Chief Complaint   Patient presents with    Routine Prenatal Visit     HPI:  Pt seen and examined. Patient is doing well today. Reports she has noticed some vaginal itching that started on . Denies VB, LOF, ctx. +FM. Denies headache, vision changes, RUQ pain, increased LE edema. Denies chest pain, shortness of breath, fever, chills, nausea, vomiting. ROS performed and is negative except as noted in HPI. Objective:  /74   Pulse 101   Wt 186 lb 12.8 oz (84.7 kg)   LMP 2018   BMI 35.30 kg/m²     Physical Exam   Constitutional: She is oriented to person, place, and time. She appears well-developed and well-nourished. No distress. HENT:   Head: Normocephalic and atraumatic. Eyes: Conjunctivae are normal.   Cardiovascular: Normal rate. Pulmonary/Chest: Effort normal. No respiratory distress. Abdominal: Soft. She exhibits no distension. There is no tenderness. There is no rebound and no guarding. Fundal height consistent with gestational age   Genitourinary: Vagina normal. No vaginal discharge found. Musculoskeletal: She exhibits no edema. Neurological: She is alert and oriented to person, place, and time. Skin: Skin is warm and dry. Psychiatric: She has a normal mood and affect. Her behavior is normal. Thought content normal.      SVE: 50/-3    NST:   Baseline: 145  Variability: moderate in degree  Accelerations: Present  Decelerations: Absent                   [place]: Contractions Present, one note on NST in 20 minutes    Category 1    Reactive: yes    Ultrasound:   OB ULTRASOUND:  BIOPHYSICAL PROFILE    DATE: 2019    PHYSICIAN: A. Raejean Sicard D.O.    SONOGRAPHER: JEAN-PAUL Walls [facility]    INDICATION: Chronic hypertension, AMA    COMPARISON: None    TYPE OF SCAN: abdominal    NST: reactive    BPP: 8/8  Tone: 2, Gross fetal movement: 2, Breathin, Fluid: 2    FINDINGS:  A single viable intrauterine pregnancy is noted in cephalic presentation. Cardiac and somatic activity are noted. The following values were obtained:   Fetal heart rate 148 bpm   Amniotic fluid index 11 cm    IMPRESSION:   Single live IUP in the 3rd trimester. BPP 8/8. [place] 11 cm. Imaging is limited secondary to gestational age. The patient is well aware of the limitations of ultrasound in the detection of anomalies. Assessment/Plan:     Roberto Constantino is a 35 y.o.  at 34w0d who presents for routine prenatal care    1. Encounter for prenatal care in third trimester of first pregnancy     - Patient is overall doing well     - Maternal wellness questionnaire reviewed - no concerns today. - Labor, decreased FM, VB, LOF precautions discussed     - NST reactive with BPP 8/8 today     - Follow-up for NST Friday as scheduled    2. Pre-existing hypertension during pregnancy, antepartum, unspecified pre-existing hypertension type     - 68127 - [place] FETAL NON-STRESS TEST     - /74 today     - Aldomet 500mg BID     - Asymptomatic     - Negative protein on urine     - 28 week labs: Plts 412; Cr < 0.5; ALT/AST 14/15; Uric Acid 2.8     - NST reactive today     - Taking ASA    3. Elderly primigravida in third trimester     - 17421 - [place] FETAL NON-STRESS TEST     - NST reactive today, BPP 8/8    4. Anemia during pregnancy in third trimester     - Recommended for increased PO intake     - H/H at 28 weeks 11.4/34.2    5.  Vaginal itching     - No evidence of infection on exam     -  Vaginal Pathogens Probes *A - will treat pending results     Jaimie Thomson DO

## 2019-05-08 ENCOUNTER — TELEPHONE (OUTPATIENT)
Dept: OBGYN CLINIC | Age: 39
End: 2019-05-08

## 2019-05-08 LAB
CANDIDA SPECIES, DNA PROBE: NORMAL
GARDNERELLA VAGINALIS, DNA PROBE: NORMAL
TRICHOMONAS VAGINALIS DNA: NORMAL

## 2019-05-08 NOTE — TELEPHONE ENCOUNTER
Pt called and given normal results from vaginal swabs. She was seen by Dr Krystal Hunter yesterday. She states she is still having vaginal itching and it has not let up. She is asking if medication can be ordered or what suggestions you have to treat.  Please advise

## 2019-05-08 NOTE — TELEPHONE ENCOUNTER
Ok to trial vaginal yeast medication. Oral medications are not approved for pregnancy. Rx for Wanda & Denilson sent to listed pharmacy.

## 2019-05-10 ENCOUNTER — ROUTINE PRENATAL (OUTPATIENT)
Dept: OBGYN CLINIC | Age: 39
End: 2019-05-10
Payer: COMMERCIAL

## 2019-05-10 VITALS
DIASTOLIC BLOOD PRESSURE: 72 MMHG | WEIGHT: 186 LBS | HEART RATE: 108 BPM | BODY MASS INDEX: 35.14 KG/M2 | SYSTOLIC BLOOD PRESSURE: 122 MMHG

## 2019-05-10 DIAGNOSIS — O10.919 PRE-EXISTING HYPERTENSION DURING PREGNANCY, ANTEPARTUM, UNSPECIFIED PRE-EXISTING HYPERTENSION TYPE: ICD-10-CM

## 2019-05-10 DIAGNOSIS — O99.013 ANEMIA DURING PREGNANCY IN THIRD TRIMESTER: ICD-10-CM

## 2019-05-10 DIAGNOSIS — O09.513 ELDERLY PRIMIGRAVIDA IN THIRD TRIMESTER: ICD-10-CM

## 2019-05-10 DIAGNOSIS — Z34.03 ENCOUNTER FOR PRENATAL CARE IN THIRD TRIMESTER OF FIRST PREGNANCY: Primary | ICD-10-CM

## 2019-05-10 PROCEDURE — 59025 FETAL NON-STRESS TEST: CPT | Performed by: OBSTETRICS & GYNECOLOGY

## 2019-05-10 PROCEDURE — 0502F SUBSEQUENT PRENATAL CARE: CPT | Performed by: OBSTETRICS & GYNECOLOGY

## 2019-05-10 NOTE — PROGRESS NOTES
Return OB Office Visit    CC:   Chief Complaint   Patient presents with    Routine Prenatal Visit     HPI:  35 yrs  at 34+3 EGA here for PNV. Pt seen and examined. Patient is doing well today. Denies VB, LOF, ctx. +FM. Denies headache, vision changes, RUQ pain, increased LE edema. Denies chest pain, shortness of breath, fever, chills, nausea, vomiting. ROS performed and is negative except as noted in HPI. Objective:  /72   Pulse 108   Wt 186 lb (84.4 kg)   LMP 2018   BMI 35.14 kg/m²     Physical Exam   Constitutional: She is oriented to person, place, and time. She appears well-developed and well-nourished. No distress. HENT:   Head: Normocephalic and atraumatic. Eyes: Conjunctivae are normal.   Cardiovascular: Normal rate. Pulmonary/Chest: Effort normal. No respiratory distress. Abdominal: Soft. She exhibits no distension. There is no tenderness. There is no rebound and no guarding. Fundal height consistent with gestational age   Genitourinary: Vagina normal. No vaginal discharge found. Musculoskeletal: She exhibits no edema. Neurological: She is alert and oriented to person, place, and time. Skin: Skin is warm and dry. Psychiatric: She has a normal mood and affect. Her behavior is normal. Thought content normal.     NST:   Baseline: 145  Variability: moderate in degree  Accelerations: Present  Decelerations: Absent  [place]: quiet     Category 1    Reactive: yes    Ultrasound:   OB ULTRASOUND:  BIOPHYSICAL PROFILE    DATE: 2019    PHYSICIAN: WERNER Art Asa D.O.    SONOGRAPHER: JEAN-PAUL Walls [facility]    INDICATION: Chronic hypertension, AMA    COMPARISON: None    TYPE OF SCAN: abdominal    NST: reactive    BPP: 8/8  Tone: 2, Gross fetal movement: 2, Breathin, Fluid: 2    FINDINGS:  A single viable intrauterine pregnancy is noted in cephalic presentation. Cardiac and somatic activity are noted.      The following values were obtained:   Fetal heart rate 148 bpm   Amniotic fluid index 11 cm    IMPRESSION:   Single live IUP in the 3rd trimester. BPP . [place] 11 cm. Assessment/Plan:     Juan Fowler is a 35 y.o.  at 34w3d who presents for routine prenatal care    1. Encounter for prenatal care in third trimester of first pregnancy     - Patient is overall doing well     - Maternal wellness questionnaire reviewed - no concerns today. - Labor, decreased FM, VB, LOF precautions discussed     - NST reactive, BPP on       - Follow-up for BPP next week as scheduled     2. Pre-existing hypertension during pregnancy, antepartum, unspecified pre-existing hypertension type     - 73989 - [place] FETAL NON-STRESS TEST     - BP WNL today     - Aldomet 500mg BID     - Asymptomatic     - Negative protein on urine     - 28 week labs: Plts 412; Cr < 0.5; ALT/AST 14/15; Uric Acid 2.8     - NST reactive today     - Taking ASA    3. Elderly primigravida in third trimester     - 80253 - [place] FETAL NON-STRESS TEST     - NST reactive today, BPP     4. Anemia during pregnancy in third trimester     - Recommended for increased PO intake     - H/H at 28 weeks 11.4/34.2     5.  Vaginal itching     - Negative BD affirm      - Symptoms resolved      Raul Prior, DO

## 2019-05-10 NOTE — PROGRESS NOTES
Temp:98.4F  Maternal emotional well being screening form completed and reviewed with patient. Current score is 1. Patient given referral to [facility] ([phone]):  No

## 2019-05-14 ENCOUNTER — OFFICE VISIT (OUTPATIENT)
Dept: OBGYN CLINIC | Age: 39
End: 2019-05-14
Payer: COMMERCIAL

## 2019-05-14 ENCOUNTER — ROUTINE PRENATAL (OUTPATIENT)
Dept: OBGYN CLINIC | Age: 39
End: 2019-05-14
Payer: COMMERCIAL

## 2019-05-14 VITALS
DIASTOLIC BLOOD PRESSURE: 78 MMHG | WEIGHT: 186 LBS | BODY MASS INDEX: 35.14 KG/M2 | HEART RATE: 76 BPM | SYSTOLIC BLOOD PRESSURE: 138 MMHG

## 2019-05-14 DIAGNOSIS — O09.513 ELDERLY PRIMIGRAVIDA IN THIRD TRIMESTER: ICD-10-CM

## 2019-05-14 DIAGNOSIS — O10.919 PRE-EXISTING HYPERTENSION DURING PREGNANCY, ANTEPARTUM, UNSPECIFIED PRE-EXISTING HYPERTENSION TYPE: ICD-10-CM

## 2019-05-14 DIAGNOSIS — Z34.03 ENCOUNTER FOR PRENATAL CARE IN THIRD TRIMESTER OF FIRST PREGNANCY: Primary | ICD-10-CM

## 2019-05-14 DIAGNOSIS — O99.013 ANEMIA DURING PREGNANCY IN THIRD TRIMESTER: ICD-10-CM

## 2019-05-14 PROCEDURE — 59025 FETAL NON-STRESS TEST: CPT | Performed by: OBSTETRICS & GYNECOLOGY

## 2019-05-14 PROCEDURE — 76816 OB US FOLLOW-UP PER FETUS: CPT | Performed by: OBSTETRICS & GYNECOLOGY

## 2019-05-14 PROCEDURE — 0502F SUBSEQUENT PRENATAL CARE: CPT | Performed by: OBSTETRICS & GYNECOLOGY

## 2019-05-17 ENCOUNTER — ROUTINE PRENATAL (OUTPATIENT)
Dept: OBGYN CLINIC | Age: 39
End: 2019-05-17
Payer: COMMERCIAL

## 2019-05-17 VITALS
HEART RATE: 84 BPM | BODY MASS INDEX: 34.96 KG/M2 | WEIGHT: 185 LBS | DIASTOLIC BLOOD PRESSURE: 72 MMHG | SYSTOLIC BLOOD PRESSURE: 124 MMHG

## 2019-05-17 DIAGNOSIS — I10 HYPERTENSION, UNSPECIFIED TYPE: ICD-10-CM

## 2019-05-17 DIAGNOSIS — J45.20 MILD INTERMITTENT ASTHMA WITHOUT COMPLICATION: ICD-10-CM

## 2019-05-17 DIAGNOSIS — O10.919 PRE-EXISTING HYPERTENSION DURING PREGNANCY, ANTEPARTUM, UNSPECIFIED PRE-EXISTING HYPERTENSION TYPE: ICD-10-CM

## 2019-05-17 DIAGNOSIS — O09.513 ELDERLY PRIMIGRAVIDA IN THIRD TRIMESTER: ICD-10-CM

## 2019-05-17 DIAGNOSIS — Z34.03 ENCOUNTER FOR PRENATAL CARE IN THIRD TRIMESTER OF FIRST PREGNANCY: Primary | ICD-10-CM

## 2019-05-17 PROCEDURE — 0502F SUBSEQUENT PRENATAL CARE: CPT | Performed by: OBSTETRICS & GYNECOLOGY

## 2019-05-17 PROCEDURE — 59025 FETAL NON-STRESS TEST: CPT | Performed by: OBSTETRICS & GYNECOLOGY

## 2019-05-17 NOTE — PROGRESS NOTES
No vaginal bleeding, no LOF, no contractions, +FM  Denies headache, vision changes, RUQ pain. Home blood pressures: 120's-130's/70's-80's  Discontinued 81 mg ASA  Maternal Wellness Questionnaire reviewed--no concerns. NON STRESS TEST    DATE: 5/17/19    : Douglas Mcmanus D.O.    COMPARISON: 5/14/19    INDICATION: AMA, hypertension    IMPRESSION:  Fetal heart tones: 130's,  NST category: 1, reactive,  Tocometry: no contractions. Diagnosis Orders   1. Encounter for prenatal care in third trimester of first pregnancy  49328 - [place] FETAL NON-STRESS TEST   2. Elderly primigravida in third trimester  11129 - [place] FETAL NON-STRESS TEST   3. Hypertension, unspecified type  61937 - [place] FETAL NON-STRESS TEST   4. Pre-existing hypertension during pregnancy, antepartum, unspecified pre-existing hypertension type     5.  Mild intermittent asthma without complication       NST reassuring  Continue biweekly prenatal testing  GBBS next week  Continue home blood pressures  Continue Aldomet 500 mg BID  Pre-eclampsia precautions  Follow up prn and 5/21/19

## 2019-05-17 NOTE — PROGRESS NOTES
Temp: 98.7F    Maternal emotional well being screening form completed and reviewed with patient. Current score is 2. Patient given referral to [facility] ([phone]):  No

## 2019-05-21 ENCOUNTER — OFFICE VISIT (OUTPATIENT)
Dept: OBGYN CLINIC | Age: 39
End: 2019-05-21

## 2019-05-21 ENCOUNTER — ROUTINE PRENATAL (OUTPATIENT)
Dept: OBGYN CLINIC | Age: 39
End: 2019-05-21
Payer: COMMERCIAL

## 2019-05-21 VITALS
BODY MASS INDEX: 35.11 KG/M2 | WEIGHT: 185.8 LBS | HEART RATE: 106 BPM | DIASTOLIC BLOOD PRESSURE: 68 MMHG | SYSTOLIC BLOOD PRESSURE: 124 MMHG

## 2019-05-21 DIAGNOSIS — Z34.03 ENCOUNTER FOR PRENATAL CARE IN THIRD TRIMESTER OF FIRST PREGNANCY: Primary | ICD-10-CM

## 2019-05-21 DIAGNOSIS — Z34.03 ENCOUNTER FOR PRENATAL CARE IN THIRD TRIMESTER OF FIRST PREGNANCY: ICD-10-CM

## 2019-05-21 DIAGNOSIS — O99.013 ANEMIA DURING PREGNANCY IN THIRD TRIMESTER: ICD-10-CM

## 2019-05-21 DIAGNOSIS — O09.513 ELDERLY PRIMIGRAVIDA IN THIRD TRIMESTER: Primary | ICD-10-CM

## 2019-05-21 DIAGNOSIS — I10 HYPERTENSION, UNSPECIFIED TYPE: ICD-10-CM

## 2019-05-21 DIAGNOSIS — O10.919 PRE-EXISTING HYPERTENSION DURING PREGNANCY, ANTEPARTUM, UNSPECIFIED PRE-EXISTING HYPERTENSION TYPE: ICD-10-CM

## 2019-05-21 DIAGNOSIS — O09.513 ELDERLY PRIMIGRAVIDA IN THIRD TRIMESTER: ICD-10-CM

## 2019-05-21 PROCEDURE — 59025 FETAL NON-STRESS TEST: CPT | Performed by: OBSTETRICS & GYNECOLOGY

## 2019-05-21 PROCEDURE — 99999 PR OFFICE/OUTPT VISIT,PROCEDURE ONLY: CPT | Performed by: OBSTETRICS & GYNECOLOGY

## 2019-05-21 PROCEDURE — 0502F SUBSEQUENT PRENATAL CARE: CPT | Performed by: OBSTETRICS & GYNECOLOGY

## 2019-05-21 NOTE — PROGRESS NOTES
Temp: 97.7F    Maternal emotional well being screening form completed and reviewed with patient. Current score is 2. Patient given referral to [address] [place] [address] ([phone]):  No

## 2019-05-24 ENCOUNTER — ROUTINE PRENATAL (OUTPATIENT)
Dept: OBGYN CLINIC | Age: 39
End: 2019-05-24
Payer: COMMERCIAL

## 2019-05-24 VITALS
BODY MASS INDEX: 35.14 KG/M2 | HEART RATE: 93 BPM | DIASTOLIC BLOOD PRESSURE: 70 MMHG | SYSTOLIC BLOOD PRESSURE: 122 MMHG | WEIGHT: 186 LBS

## 2019-05-24 DIAGNOSIS — O10.919 PRE-EXISTING HYPERTENSION DURING PREGNANCY, ANTEPARTUM, UNSPECIFIED PRE-EXISTING HYPERTENSION TYPE: ICD-10-CM

## 2019-05-24 DIAGNOSIS — Z34.03 ENCOUNTER FOR PRENATAL CARE IN THIRD TRIMESTER OF FIRST PREGNANCY: Primary | ICD-10-CM

## 2019-05-24 DIAGNOSIS — O09.513 ELDERLY PRIMIGRAVIDA IN THIRD TRIMESTER: ICD-10-CM

## 2019-05-24 DIAGNOSIS — O99.013 ANEMIA DURING PREGNANCY IN THIRD TRIMESTER: ICD-10-CM

## 2019-05-24 LAB — GROUP B STREP CULTURE: NORMAL

## 2019-05-24 PROCEDURE — 0502F SUBSEQUENT PRENATAL CARE: CPT | Performed by: OBSTETRICS & GYNECOLOGY

## 2019-05-24 PROCEDURE — 59025 FETAL NON-STRESS TEST: CPT | Performed by: OBSTETRICS & GYNECOLOGY

## 2019-05-24 NOTE — PROGRESS NOTES
No vaginal bleeding, no LOF, no contractions, +FM  Denies headache, vision changes, and RUQ pain  Home blood pressures stable:  120's-130's/70's-80's  Maternal Wellness Questionnaire reviewed--no concerns. NON STRESS TEST    DATE:  19    :  Mauricio Padilla D.O.    COMPARISON: 19    INDICATION:  HTN, AMA    IMPRESSION:  Fetal heart tones: 140's,  NST category: 1, reactive,  Tocometry: no contractions. OB ULTRASOUND:  BIOPHYSICAL PROFILE    DATE: 2019    PHYSICIAN: AURELIO Mcmanus D.O.    SONOGRAPHER: JEAN-PAUL Walls RDMS    INDICATION: Fetal well being    COMPARISON: none    TYPE OF SCAN: abdominal    NST: reactive    BPP: 8/8  Tone: 2, Gross fetal movement: 2, Breathin, Fluid: 2    FINDINGS:  A single viable intrauterine pregnancy is noted in cephalic presentation. Cardiac and somatic activity are noted. The following values were obtained:   Fetal heart rate 146bpm   Amniotic fluid index 13.05cm    IMPRESSION:   Single live IUP in the third trimester. BPP 8/8. [place] 13.05cm. Imaging is limited secondary to fetal position. The patient is well aware of the limitations of ultrasound in the detection of anomalies. Diagnosis Orders   1. Encounter for prenatal care in third trimester of first pregnancy  Strep B Screen, Vaginal / Rectal    19852 - [place] FETAL NON-STRESS TEST   2. Elderly primigravida in third trimester  62673 - [place] FETAL NON-STRESS TEST   3. Anemia during pregnancy in third trimester     4. Pre-existing hypertension during pregnancy, antepartum, unspecified pre-existing hypertension type  86458 - [place] FETAL NON-STRESS TEST     Orders Placed This Encounter   Procedures    Strep B Screen, Vaginal / Rectal    79761 - [place] FETAL NON-STRESS TEST     Continue home blood pressures  GBBS next week  Continue Aldomet 500 mg BID  Pre-eclampsia precautions. Labor precautions, kick counts  Follow up 19 for prenatal visit and NST.

## 2019-05-28 ENCOUNTER — OFFICE VISIT (OUTPATIENT)
Dept: OBGYN CLINIC | Age: 39
End: 2019-05-28
Payer: COMMERCIAL

## 2019-05-28 ENCOUNTER — ROUTINE PRENATAL (OUTPATIENT)
Dept: OBGYN CLINIC | Age: 39
End: 2019-05-28

## 2019-05-28 VITALS
WEIGHT: 186.6 LBS | HEART RATE: 92 BPM | BODY MASS INDEX: 35.26 KG/M2 | SYSTOLIC BLOOD PRESSURE: 112 MMHG | DIASTOLIC BLOOD PRESSURE: 80 MMHG

## 2019-05-28 DIAGNOSIS — O10.919 PRE-EXISTING HYPERTENSION DURING PREGNANCY, ANTEPARTUM, UNSPECIFIED PRE-EXISTING HYPERTENSION TYPE: ICD-10-CM

## 2019-05-28 DIAGNOSIS — O99.013 ANEMIA DURING PREGNANCY IN THIRD TRIMESTER: ICD-10-CM

## 2019-05-28 DIAGNOSIS — O09.513 ELDERLY PRIMIGRAVIDA IN THIRD TRIMESTER: ICD-10-CM

## 2019-05-28 DIAGNOSIS — Z34.03 ENCOUNTER FOR PRENATAL CARE IN THIRD TRIMESTER OF FIRST PREGNANCY: Primary | ICD-10-CM

## 2019-05-28 DIAGNOSIS — J45.20 MILD INTERMITTENT ASTHMA WITHOUT COMPLICATION: ICD-10-CM

## 2019-05-28 DIAGNOSIS — I10 HYPERTENSION, UNSPECIFIED TYPE: ICD-10-CM

## 2019-05-28 PROCEDURE — 76816 OB US FOLLOW-UP PER FETUS: CPT | Performed by: OBSTETRICS & GYNECOLOGY

## 2019-05-28 PROCEDURE — 0502F SUBSEQUENT PRENATAL CARE: CPT | Performed by: OBSTETRICS & GYNECOLOGY

## 2019-05-28 RX ORDER — METHYLDOPA 500 MG/1
500 TABLET, FILM COATED ORAL 2 TIMES DAILY
Qty: 60 TABLET | Refills: 3 | Status: SHIPPED | OUTPATIENT
Start: 2019-05-28 | End: 2019-09-04 | Stop reason: ALTCHOICE

## 2019-05-28 NOTE — PROGRESS NOTES
No vaginal bleeding, no LOF, some irregular contractions, +FM  Denies headaches, vision changes, and RUQ pain. Ran low on aldomet through the weekend--took daily dose instead of BID.   1/50/-3  Maternal Wellness Questionnaire reviewed--no concerns. OBSTETRIC ULTRASOUND GROWTH    DATE: 05/28/2019    PHYSICIAN: AURELIO Mcmanus D.O.     SONOGRAPHER: JEAN-PAUL Walls [facility]    INDICATION: Growth    COMPARISON: 05/21/2019    TYPE OF SCAN: abdominal    FINDINGS:  A single viable intrauterine pregnancy is noted in cephalic presentation. Cardiac and somatic activity are noted. The following values were obtained:    Fetal heart rate    175 bpm   BPD      9.12cm  37 weeks 0 day(s)   Head Circumference    32.73cm  37 weeks 1 day(s)    Abdominal Circumference   35.17cm  39 weeks 1 day(s)   Femur Length     7.02cm  36 weeks 0 day(s)   Humerus Length    6.39cm  37 weeks 1 day(s)   Amniotic fluid index    12.80cm   EFW      3345g  71.7 percentile    Amniotic fluid volume is normal. Based on sonographic criteria the estimated fetal age is 37 weeks and 2 days with EDC of 06/16/2019. There is a 2 day discordance with the established EDC of 06/18/2019. The patient has a anterior placenta that is adequate distance in relation to the internal cervical os. The evaluation of the lower uterine segment and cervix reveals normal appearing anatomy. The uterus is unremarkable/gravid. Maternal ovaries and adnexae are not well visualized due to the size of the uterus and patient's gravid state. Anatomy seen includes: heart, stomach, kidneys, bladder    IMPRESSION:  Single live IUP in the third trimester. Adequate interval fetal growth. Imaging is limited secondary to gestational age. The patient is well aware of the limitations of ultrasound in the detection of anomalies. .   Diagnosis Orders   1. Encounter for prenatal care in third trimester of first pregnancy     2. Elderly primigravida in third trimester     3.  Hypertension, unspecified type     4. Pre-existing hypertension during pregnancy, antepartum, unspecified pre-existing hypertension type     5. Anemia during pregnancy in third trimester     6. Mild intermittent asthma without complication       Pre-eclampsia precautions  Labor precautions, kick counts  Follow up in 3 days for prenatal visit and NST.

## 2019-05-28 NOTE — PROGRESS NOTES
Temp: 97.8F  Maternal emotional well being screening form completed and reviewed with patient. Current score is 2. Patient given referral to [address] [place] [address] ([phone]):  No

## 2019-05-31 ENCOUNTER — ROUTINE PRENATAL (OUTPATIENT)
Dept: OBGYN CLINIC | Age: 39
End: 2019-05-31
Payer: COMMERCIAL

## 2019-05-31 VITALS
DIASTOLIC BLOOD PRESSURE: 66 MMHG | SYSTOLIC BLOOD PRESSURE: 108 MMHG | BODY MASS INDEX: 35.33 KG/M2 | HEART RATE: 80 BPM | WEIGHT: 187 LBS

## 2019-05-31 DIAGNOSIS — O99.013 ANEMIA DURING PREGNANCY IN THIRD TRIMESTER: ICD-10-CM

## 2019-05-31 DIAGNOSIS — J45.20 MILD INTERMITTENT ASTHMA WITHOUT COMPLICATION: ICD-10-CM

## 2019-05-31 DIAGNOSIS — O09.513 ELDERLY PRIMIGRAVIDA IN THIRD TRIMESTER: ICD-10-CM

## 2019-05-31 DIAGNOSIS — Z34.03 ENCOUNTER FOR PRENATAL CARE IN THIRD TRIMESTER OF FIRST PREGNANCY: Primary | ICD-10-CM

## 2019-05-31 DIAGNOSIS — O10.919 PRE-EXISTING HYPERTENSION DURING PREGNANCY, ANTEPARTUM, UNSPECIFIED PRE-EXISTING HYPERTENSION TYPE: ICD-10-CM

## 2019-05-31 PROCEDURE — 0502F SUBSEQUENT PRENATAL CARE: CPT | Performed by: OBSTETRICS & GYNECOLOGY

## 2019-05-31 PROCEDURE — 59025 FETAL NON-STRESS TEST: CPT | Performed by: OBSTETRICS & GYNECOLOGY

## 2019-05-31 NOTE — LETTER
[facility]  [address]  [address]   Phone: [phone]  Fax: [address], DO        May 31, 2019     Patient: Abebe Babin   YOB: 1980   Date of Visit: 5/31/2019       To Whom It May Concern:    Please excuse Abebe Babin from work 5- through 1-69-56 due to complications in pregnancy. If you have any questions or concerns, please don't hesitate to call.     Sincerely,            Jose Mcmanus, DO

## 2019-06-03 ENCOUNTER — ANESTHESIA EVENT (OUTPATIENT)
Dept: LABOR AND DELIVERY | Age: 39
End: 2019-06-03
Payer: COMMERCIAL

## 2019-06-03 ENCOUNTER — ANESTHESIA (OUTPATIENT)
Dept: LABOR AND DELIVERY | Age: 39
End: 2019-06-03
Payer: COMMERCIAL

## 2019-06-03 ENCOUNTER — HOSPITAL ENCOUNTER (INPATIENT)
Age: 39
LOS: 4 days | Discharge: HOME | End: 2019-06-07
Attending: OBSTETRICS & GYNECOLOGY | Admitting: OBSTETRICS & GYNECOLOGY
Payer: COMMERCIAL

## 2019-06-03 ENCOUNTER — APPOINTMENT (OUTPATIENT)
Dept: LABOR AND DELIVERY | Age: 39
End: 2019-06-03
Payer: COMMERCIAL

## 2019-06-03 DIAGNOSIS — I10 HYPERTENSION, UNSPECIFIED TYPE: ICD-10-CM

## 2019-06-03 DIAGNOSIS — Z98.891 S/P PRIMARY LOW TRANSVERSE C-SECTION: Primary | ICD-10-CM

## 2019-06-03 PROBLEM — Z34.90 ENCOUNTER FOR INDUCTION OF LABOR: Status: ACTIVE | Noted: 2019-06-03

## 2019-06-03 LAB
ABO/RH: NORMAL
AMPHETAMINE SCREEN, URINE: NORMAL
ANTIBODY SCREEN: NORMAL
BARBITURATE SCREEN URINE: NORMAL
BASOPHILS ABSOLUTE: 0 K/UL (ref 0–0.2)
BASOPHILS RELATIVE PERCENT: 0.2 %
BENZODIAZEPINE SCREEN, URINE: NORMAL
BUPRENORPHINE URINE: NORMAL
CANNABINOID SCREEN URINE: NORMAL
COCAINE METABOLITE SCREEN URINE: NORMAL
EOSINOPHILS ABSOLUTE: 0.2 K/UL (ref 0–0.6)
EOSINOPHILS RELATIVE PERCENT: 2.1 %
HCT VFR BLD CALC: 37 % (ref 36–48)
HEMOGLOBIN: 12.3 G/DL (ref 12–16)
LACTATE DEHYDROGENASE: 120 U/L (ref 100–190)
LYMPHOCYTES ABSOLUTE: 2.1 K/UL (ref 1–5.1)
LYMPHOCYTES RELATIVE PERCENT: 27 %
Lab: NORMAL
MCH RBC QN AUTO: 32 PG (ref 26–34)
MCHC RBC AUTO-ENTMCNC: 33.1 G/DL (ref 31–36)
MCV RBC AUTO: 96.4 FL (ref 80–100)
METHADONE SCREEN, URINE: NORMAL
MONOCYTES ABSOLUTE: 0.9 K/UL (ref 0–1.3)
MONOCYTES RELATIVE PERCENT: 11.8 %
NEUTROPHILS ABSOLUTE: 4.6 K/UL (ref 1.7–7.7)
NEUTROPHILS RELATIVE PERCENT: 58.9 %
OPIATE SCREEN URINE: NORMAL
OXYCODONE URINE: NORMAL
PDW BLD-RTO: 13.3 % (ref 12.4–15.4)
PH UA: 6
PHENCYCLIDINE SCREEN URINE: NORMAL
PLATELET # BLD: 354 K/UL (ref 135–450)
PMV BLD AUTO: 7.3 FL (ref 5–10.5)
PROPOXYPHENE SCREEN: NORMAL
RBC # BLD: 3.84 M/UL (ref 4–5.2)
TOTAL SYPHILLIS IGG/IGM: NORMAL
URIC ACID, SERUM: 3.3 MG/DL (ref 2.6–6)
WBC # BLD: 7.9 K/UL (ref 4–11)

## 2019-06-03 PROCEDURE — 84550 ASSAY OF BLOOD/URIC ACID: CPT

## 2019-06-03 PROCEDURE — 3700000025 EPIDURAL BLOCK: Performed by: ANESTHESIOLOGY

## 2019-06-03 PROCEDURE — 2500000003 HC RX 250 WO HCPCS: Performed by: NURSE ANESTHETIST, CERTIFIED REGISTERED

## 2019-06-03 PROCEDURE — 2580000003 HC RX 258: Performed by: OBSTETRICS & GYNECOLOGY

## 2019-06-03 PROCEDURE — 85025 COMPLETE CBC W/AUTO DIFF WBC: CPT

## 2019-06-03 PROCEDURE — 86901 BLOOD TYPING SEROLOGIC RH(D): CPT

## 2019-06-03 PROCEDURE — 83615 LACTATE (LD) (LDH) ENZYME: CPT

## 2019-06-03 PROCEDURE — 2580000003 HC RX 258

## 2019-06-03 PROCEDURE — 6360000002 HC RX W HCPCS: Performed by: OBSTETRICS & GYNECOLOGY

## 2019-06-03 PROCEDURE — 86850 RBC ANTIBODY SCREEN: CPT

## 2019-06-03 PROCEDURE — 86780 TREPONEMA PALLIDUM: CPT

## 2019-06-03 PROCEDURE — 80307 DRUG TEST PRSMV CHEM ANLYZR: CPT

## 2019-06-03 PROCEDURE — 86900 BLOOD TYPING SEROLOGIC ABO: CPT

## 2019-06-03 PROCEDURE — 1220000000 HC SEMI PRIVATE OB R&B

## 2019-06-03 PROCEDURE — 51701 INSERT BLADDER CATHETER: CPT

## 2019-06-03 RX ORDER — METHYLDOPA 250 MG/1
500 TABLET, FILM COATED ORAL EVERY 12 HOURS SCHEDULED
Status: DISCONTINUED | OUTPATIENT
Start: 2019-06-03 | End: 2019-06-03 | Stop reason: CLARIF

## 2019-06-03 RX ORDER — LIDOCAINE HYDROCHLORIDE 10 MG/ML
30 INJECTION, SOLUTION EPIDURAL; INFILTRATION; INTRACAUDAL; PERINEURAL PRN
Status: DISCONTINUED | OUTPATIENT
Start: 2019-06-03 | End: 2019-06-07 | Stop reason: HOSPADM

## 2019-06-03 RX ORDER — NALBUPHINE HCL 10 MG/ML
10 AMPUL (ML) INJECTION
Status: DISCONTINUED | OUTPATIENT
Start: 2019-06-03 | End: 2019-06-04

## 2019-06-03 RX ORDER — METHYLDOPA 500 MG/1
500 TABLET, FILM COATED ORAL 2 TIMES DAILY
Status: DISCONTINUED | OUTPATIENT
Start: 2019-06-03 | End: 2019-06-07 | Stop reason: HOSPADM

## 2019-06-03 RX ORDER — SODIUM CHLORIDE, SODIUM LACTATE, POTASSIUM CHLORIDE, CALCIUM CHLORIDE 600; 310; 30; 20 MG/100ML; MG/100ML; MG/100ML; MG/100ML
INJECTION, SOLUTION INTRAVENOUS
Status: COMPLETED
Start: 2019-06-03 | End: 2019-06-03

## 2019-06-03 RX ORDER — SIMETHICONE 80 MG
80 TABLET,CHEWABLE ORAL EVERY 6 HOURS PRN
Status: DISCONTINUED | OUTPATIENT
Start: 2019-06-03 | End: 2019-06-04

## 2019-06-03 RX ORDER — BUPIVACAINE HYDROCHLORIDE 2.5 MG/ML
INJECTION, SOLUTION EPIDURAL; INFILTRATION; INTRACAUDAL PRN
Status: DISCONTINUED | OUTPATIENT
Start: 2019-06-03 | End: 2019-06-04 | Stop reason: SDUPTHER

## 2019-06-03 RX ORDER — SODIUM CHLORIDE, SODIUM LACTATE, POTASSIUM CHLORIDE, CALCIUM CHLORIDE 600; 310; 30; 20 MG/100ML; MG/100ML; MG/100ML; MG/100ML
INJECTION, SOLUTION INTRAVENOUS CONTINUOUS
Status: DISCONTINUED | OUTPATIENT
Start: 2019-06-03 | End: 2019-06-04

## 2019-06-03 RX ORDER — SODIUM CHLORIDE 0.9 % (FLUSH) 0.9 %
10 SYRINGE (ML) INJECTION PRN
Status: DISCONTINUED | OUTPATIENT
Start: 2019-06-03 | End: 2019-06-04

## 2019-06-03 RX ORDER — SODIUM CHLORIDE 0.9 % (FLUSH) 0.9 %
10 SYRINGE (ML) INJECTION EVERY 12 HOURS SCHEDULED
Status: DISCONTINUED | OUTPATIENT
Start: 2019-06-03 | End: 2019-06-04

## 2019-06-03 RX ORDER — FAMOTIDINE 20 MG/1
20 TABLET, FILM COATED ORAL 2 TIMES DAILY
Status: DISCONTINUED | OUTPATIENT
Start: 2019-06-03 | End: 2019-06-07 | Stop reason: HOSPADM

## 2019-06-03 RX ORDER — ONDANSETRON 2 MG/ML
4 INJECTION INTRAMUSCULAR; INTRAVENOUS EVERY 6 HOURS PRN
Status: DISCONTINUED | OUTPATIENT
Start: 2019-06-03 | End: 2019-06-07 | Stop reason: HOSPADM

## 2019-06-03 RX ORDER — DOCUSATE SODIUM 100 MG/1
100 CAPSULE, LIQUID FILLED ORAL 2 TIMES DAILY
Status: DISCONTINUED | OUTPATIENT
Start: 2019-06-03 | End: 2019-06-07 | Stop reason: HOSPADM

## 2019-06-03 RX ADMIN — Medication 15 ML/HR: at 14:52

## 2019-06-03 RX ADMIN — SODIUM CHLORIDE, POTASSIUM CHLORIDE, SODIUM LACTATE AND CALCIUM CHLORIDE: 600; 310; 30; 20 INJECTION, SOLUTION INTRAVENOUS at 08:00

## 2019-06-03 RX ADMIN — ONDANSETRON 4 MG: 2 INJECTION INTRAMUSCULAR; INTRAVENOUS at 18:02

## 2019-06-03 RX ADMIN — SODIUM CHLORIDE, POTASSIUM CHLORIDE, SODIUM LACTATE AND CALCIUM CHLORIDE: 600; 310; 30; 20 INJECTION, SOLUTION INTRAVENOUS at 12:02

## 2019-06-03 RX ADMIN — SODIUM CHLORIDE, POTASSIUM CHLORIDE, SODIUM LACTATE AND CALCIUM CHLORIDE: 600; 310; 30; 20 INJECTION, SOLUTION INTRAVENOUS at 17:41

## 2019-06-03 RX ADMIN — BUPIVACAINE HYDROCHLORIDE 8 ML: 2.5 INJECTION, SOLUTION EPIDURAL; INFILTRATION; INTRACAUDAL; PERINEURAL at 14:47

## 2019-06-03 RX ADMIN — METHYLDOPA 500 MG: 500 TABLET, FILM COATED ORAL at 14:03

## 2019-06-03 RX ADMIN — Medication 1 MILLI-UNITS/MIN: at 09:00

## 2019-06-03 RX ADMIN — SODIUM CHLORIDE, POTASSIUM CHLORIDE, SODIUM LACTATE AND CALCIUM CHLORIDE: 600; 310; 30; 20 INJECTION, SOLUTION INTRAVENOUS at 14:44

## 2019-06-03 RX ADMIN — SODIUM CHLORIDE, PRESERVATIVE FREE 10 ML: 5 INJECTION INTRAVENOUS at 18:03

## 2019-06-03 ASSESSMENT — PAIN DESCRIPTION - DESCRIPTORS
DESCRIPTORS: CRAMPING

## 2019-06-03 ASSESSMENT — PAIN - FUNCTIONAL ASSESSMENT: PAIN_FUNCTIONAL_ASSESSMENT: 0-10

## 2019-06-03 NOTE — ANESTHESIA PROCEDURE NOTES
Epidural Block    Patient location during procedure: OB  Reason for block: labor epidural  Staffing  Resident/CRNA: Solo Forbes APRN - CRNA  Preanesthetic Checklist  Completed: patient identified, pre-op evaluation, timeout performed, IV checked, risks and benefits discussed, monitors and equipment checked, anesthesia consent given, oxygen available and patient being monitored  Epidural  Patient position: sitting  Prep: ChloraPrep  Patient monitoring: continuous pulse ox and frequent blood pressure checks  Approach: midline  Location: lumbar (1-5)  Injection technique: HERMILA saline  Provider prep: mask and sterile gloves  Needle  Needle type: Tuohy   Needle gauge: 17 G  Needle length: 3.5 in  Needle insertion depth: 7 cm  Catheter type: side hole  Catheter size: 19 G  Catheter at skin depth: 12 cm  Test dose: negative  Assessment  Sensory level: T8  Hemodynamics: stable  Attempts: 1  Additional Notes  Pt prepped and draped in sterile fashion. Skin wheal with 1% lidocaine. HERMILA with 3 cc NS, 25g spinal needle inserted per judah. Clear CSF visualized in hub. Needle withdrawn and catheter threaded. Negative test dose 3cc 1.5% Lidocaine with Epinephrine. Sterile dressing applied.

## 2019-06-03 NOTE — ANESTHESIA PRE PROCEDURE
Department of Anesthesiology  Preprocedure Note       Name:  Sacha Corral   Age:  35 y.o.  :  1980                                          MRN:  [id number]         Date:  6/3/2019      Surgeon: * No surgeons listed *    Procedure: Labor Analgesia    Medications prior to admission:   Prior to Admission medications    Medication Sig Start Date End Date Taking? Authorizing Provider   methyldopa (ALDOMET) 500 MG tablet Take 1 tablet by mouth 2 times daily 19 Yes Marilyn Mcmanus DO   Prenatal MV-Min-Fe Fum-FA-DHA (PRENATAL MULTIVITAMIN PLUS DHA) 27-0.8-250 MG CAPS Take 1 tablet by mouth daily 18  Yes Marilyn Mcmanus DO   cetirizine (ZYRTEC) 10 MG tablet Take 1 tablet by mouth daily as needed for Allergies. 14  Yes Ana Larkin MD   albuterol (PROVENTIL HFA;VENTOLIN HFA) 108 (90 BASE) MCG/ACT inhaler Inhale 2 puffs into the lungs 4 times daily as needed.  14   Ana Larkin MD       Current medications:    Current Facility-Administered Medications   Medication Dose Route Frequency Provider Last Rate Last Dose    lactated ringers infusion   Intravenous Continuous Gerhard Hicks,  125 mL/hr at 19 1202      sodium chloride flush 0.9 % injection 10 mL  10 mL Intravenous 2 times per day Gerhard Hicks, DO        sodium chloride flush 0.9 % injection 10 mL  10 mL Intravenous PRN Gerhard Brunsone, DO        docusate sodium (COLACE) capsule 100 mg  100 mg Oral BID Gerhard Brunsone, DO        [facility] injection 4 mg  4 mg Intravenous Q6H PRN Gerhard Brunsone, DO        nalbuphine (NUBAIN) injection 10 mg  10 mg Intravenous Q2H PRN Gerhard Brunsone, DO        lidocaine PF 1 % injection 30 mL  30 mL Other PRN Gerhard Brunsone, DO        hydrocortisone 2.5 % cream   Topical Q2H PRN Gerhard Brunsone, DO        oxytocin (PITOCIN) 30 units in 500 mL infusion  1 sheridan-units/min Intravenous Continuous [place], DO 12 mL/hr at 06/03/19 1400 12 sheridan-units/min at 06/03/19 1400    simethicone (MYLICON) chewable tablet 80 mg  80 mg Oral Q6H PRN Surekha Kervin, DO        famotidine (PEPCID) tablet 20 mg  20 mg Oral BID Surekha Kervin, DO        oxytocin (PITOCIN) 30 units in 500 mL infusion  1 sheridan-units/min Intravenous Continuous PRN Surekha [place], DO        methyldopa (ALDOMET) tablet 500 mg  500 mg Oral BID Caleen Del Rosario, DO   500 mg at 06/03/19 1403       Allergies: Allergies   Allergen Reactions    Flector [Diclofenac Epolamine] Rash       Problem List:    Patient Active Problem List   Diagnosis Code    HTN (hypertension) I10    Asthma J45.909    Irregular menses N92.6    Vitamin D deficiency E55.9    Encounter for prenatal care in third trimester of first pregnancy Z34.03    Pre-existing hypertension during pregnancy, antepartum O10.26    Elderly primigravida in third trimester O09.513    Glucosuria R81    History of marijuana use Z87.898    Anemia during pregnancy in third trimester O99.013       Past Medical History:        Diagnosis Date    Allergic rhinitis     Anemia during pregnancy in second trimester 2/14/2019    Asthma     Chronic back pain     Dyslipidemia     Dysmenorrhea     Encounter for prenatal care in second trimester of first pregnancy 12/17/2018    History of menorrhagia 2006    Hypertension     Infertility, female     Irregular menses 4/15/2013    PCOS (polycystic ovarian syndrome)        Past Surgical History:  History reviewed. No pertinent surgical history.     Social History:    Social History     Tobacco Use    Smoking status: Never Smoker    Smokeless tobacco: Never Used   Substance Use Topics    Alcohol use: No     Alcohol/week: 1.5 oz     Types: 3 Standard drinks or equivalent per week     Comment: occasion                                Counseling given: Not Answered      Vital Signs (Current):   Vitals:    06/03/19 1100 06/03/19 1200 06/03/19 1300 06/03/19 1400   BP: (!) 154/94 (!) 144/94 (!) 141/85 (!) 140/93   Pulse: 81 74 78 80   Resp: 16 16 16 16   Temp:   36.8 °C (98.3 °F)    TempSrc:   Oral    Weight:       Height:                                                  BP Readings from Last 3 Encounters:   06/03/19 (!) 140/93   05/31/19 108/66   05/28/19 112/80       NPO Status: Time of last liquid consumption: 0600                        Time of last solid consumption: 0600                        Date of last liquid consumption: 06/03/19                        Date of last solid food consumption: 06/03/19    BMI:   Wt Readings from Last 3 Encounters:   06/03/19 187 lb (84.8 kg)   05/31/19 187 lb (84.8 kg)   05/28/19 186 lb 9.6 oz (84.6 kg)     Body mass index is 34.2 kg/m². CBC:   Lab Results   Component Value Date    WBC 7.9 06/03/2019    RBC 3.84 06/03/2019    HGB 12.3 06/03/2019    HCT 37.0 06/03/2019    MCV 96.4 06/03/2019    RDW 13.3 06/03/2019     06/03/2019       CMP:   Lab Results   Component Value Date     03/18/2019    K 3.8 03/18/2019     03/18/2019    CO2 17 03/18/2019    BUN 6 03/18/2019    CREATININE <0.5 03/18/2019    GFRAA >60 03/18/2019    GFRAA >60 04/25/2013    AGRATIO 1.5 12/11/2018    LABGLOM >60 03/18/2019    GLUCOSE 119 03/18/2019    PROT 6.9 12/11/2018    PROT 7.3 03/16/2012    CALCIUM 9.3 03/18/2019    BILITOT <0.2 12/11/2018    ALKPHOS 40 12/11/2018    AST 15 03/18/2019    ALT 14 03/18/2019       POC Tests: No results for input(s): POCGLU, POCNA, POCK, POCCL, POCBUN, POCHEMO, POCHCT in the last 72 hours.     Coags: No results found for: PROTIME, INR, APTT    HCG (If Applicable):   Lab Results   Component Value Date    PREGTESTUR POSITIVE 11/13/2018        ABGs: No results found for: PHART, PO2ART, TPS4HFW, MAF7JDB, BEART, P1OBPPOJ     Type & Screen (If Applicable):  No results found for: LABABO, [address]    Anesthesia Evaluation  Patient summary reviewed and Nursing notes reviewed no history of anesthetic complications:   Airway: Mallampati: II     Neck ROM: full   Dental: normal exam         Pulmonary:Negative Pulmonary ROS and normal exam    (+) asthma:                            Cardiovascular:Negative CV ROS    (+) hypertension:,                   Neuro/Psych:   Negative Neuro/Psych ROS              GI/Hepatic/Renal: Neg GI/Hepatic/Renal ROS            Endo/Other: Negative Endo/Other ROS                     ROS comment: PCOS   Abdominal:           Vascular:                                        Anesthesia Plan      epidural     ASA 2 - emergent     (I discussed with the patient the risks and benefits of labor epidural placement. All questions were answered the patient agrees with the plan. Recent Vitals:  --------------------            06/03/19               1400     --------------------   BP:     (!) 140/93   Pulse:      80       Resp:       16       Temp:               --------------------  Body mass index is 34.2 kg/m².     Social History    Tobacco Use      Smoking status: Never Smoker      Smokeless tobacco: Never Used      LABS:    CBC  Lab Results       Component                Value               Date/Time                  WBC                      7.9                 06/03/2019 08:00 AM        HGB                      12.3                06/03/2019 08:00 AM        HCT                      37.0                06/03/2019 08:00 AM        PLT                      354                 06/03/2019 08:00 AM   RENAL  Lab Results       Component                Value               Date/Time                  NA                       138                 03/18/2019 04:19 PM        K                        3.8                 03/18/2019 04:19 PM        CL                       105                 03/18/2019 04:19 PM        CO2                      17 (L)              03/18/2019 04:19 PM        BUN                      6 (L)               03/18/2019 04:19 PM        CREATININE <0.5 (L)            03/18/2019 04:19 PM        GLUCOSE                  119 (H)             03/18/2019 04:19 PM   COAGS  No results found for: PROTIME, INR, APTT)        Anesthetic plan and risks discussed with patient.                       Corey Singh, JODY - CRNA   6/3/2019

## 2019-06-03 NOTE — PROGRESS NOTES
[facility], CRNA notified that patient requesting epidural.  Will be to bedside.

## 2019-06-03 NOTE — PROGRESS NOTES
Spoke with Dr. Leonid Preston via telephone. Updated on 's with moderate variability and accelerations. Contractions q3-5m. Patient rating pain 2/10. Saba bulb still in place. States to contact Md once saba bulb comes out. Will continue to monitor.

## 2019-06-03 NOTE — H&P
Obstetrics History and Physical    CC:   Chief Complaint   Patient presents with    Scheduled Induction     saba bulb - CHTN       HPI: Don Santos is a 35 y.o. Ardean Rower at 37w6d who presents for medical IOL. Denies vaginal bleeding. Denies leaking fluid. Denies contractions. Endorses fetal movement. Review of Systems: The following ROS was otherwise negative, except as noted in the HPI: constitutional, HEENT, respiratory, cardiovascular, gastrointestinal, genitourinary, skin, musculoskeletal, neurological, psych    OBGYN Provider : Dr. Ezequiel Chamberlain, DO    Obstetrical History:  OB History    Para Term  AB Living   1 0 0 0 0 0   SAB TAB Ectopic Molar Multiple Live Births   0 0 0 0 0 0      # Outcome Date GA Lbr Connor/2nd Weight Sex Delivery Anes PTL Lv   1 Current                Gynecologic History:   Denies hx of abnl pap smears. Denies hx of STIs. Past Medical History:   Past Medical History:   Diagnosis Date    Allergic rhinitis     Anemia during pregnancy in second trimester 2019    Asthma     Chronic back pain     Dyslipidemia     Dysmenorrhea     Encounter for prenatal care in second trimester of first pregnancy 2018    History of menorrhagia     Hypertension     Infertility, female     Irregular menses 4/15/2013    PCOS (polycystic ovarian syndrome)        Medications:  No current facility-administered medications on file prior to encounter. Current Outpatient Medications on File Prior to Encounter   Medication Sig Dispense Refill    methyldopa (ALDOMET) 500 MG tablet Take 1 tablet by mouth 2 times daily 60 tablet 3    Prenatal MV-Min-Fe Fum-FA-DHA (PRENATAL MULTIVITAMIN PLUS DHA) 27-0.8-250 MG CAPS Take 1 tablet by mouth daily 90 capsule 3    cetirizine (ZYRTEC) 10 MG tablet Take 1 tablet by mouth daily as needed for Allergies.       albuterol (PROVENTIL HFA;VENTOLIN HFA) 108 (90 BASE) MCG/ACT inhaler Inhale 2 puffs into the lungs 4 times daily as needed. 1 Inhaler 1     Allergies:  Flector [diclofenac epolamine]    Surgical History:  History reviewed. No pertinent surgical history. Family History:  Family History   Problem Relation Age of Onset    High Blood Pressure Mother     Diabetes Mother     High Cholesterol Mother     High Blood Pressure Maternal Grandmother     Diabetes Maternal Grandmother     High Blood Pressure Sister     High Cholesterol Sister     Rheum Arthritis Neg Hx     Osteoarthritis Neg Hx     Asthma Neg Hx     Breast Cancer Neg Hx     Cancer Neg Hx     Heart Failure Neg Hx     Hypertension Neg Hx     Migraines Neg Hx     Ovarian Cancer Neg Hx     Rashes/Skin Problems Neg Hx     Seizures Neg Hx     Stroke Neg Hx     Thyroid Disease Neg Hx        Social History:  Social History     Substance and Sexual Activity   Alcohol Use No    Alcohol/week: 1.5 oz    Types: 3 Standard drinks or equivalent per week    Comment: occasion     Social History     Substance and Sexual Activity   Drug Use No     Social History     Tobacco Use   Smoking Status Never Smoker   Smokeless Tobacco Never Used       Physical Exam:  BP (!) 137/91   Pulse 102   Temp 98.5 °F (36.9 °C) (Oral)   Resp 16   Ht 5' 2\" (1.575 m)   Wt 187 lb (84.8 kg)   LMP 08/07/2018   BMI 34.20 kg/m²   General: Alert, well appearing, no acute distress  Head: Normocephalic, atraumatic  Lungs: Clear to auscultation bilaterally without rales, rhonchi, wheezing  CV: Regular rate and regular rhythm, normal S1, S2 without murmurs, rubs, clicks or gallops. Abdomen: Gravid, soft, nontender   Pelvic:   External: Normal appearing genitalia without masses, tenderness or lesions  Vagina: moist, pink mucosa, physiologic discharge  Cervix: no masses or lesions visualized, no cervical motion tenderness  2-3/60/-2, soft, anterior.  Cedeño Bulb placed without difficulty   Uterus: midline, gravid  Adnexa: mobile, non-tender to palpation, without masses  Rectovaginal: deferred  Extremities: No redness or tenderness, neg Monalisa's sign  Skin: Well perfused, normal coloration and turgor, no lesions or rashes visualized  Neuro: Alert, oriented, normal speech, no focal deficits, moves extremities appropriately  Psych: Appropriate, normal affect, appears stated age  Osteopathic: No TART changes    Fetal Heart Monitoring:  FHT: 145 baseline, mod earnetsine, +accels, neg decels  Kyle: Irregular     Labs:  No visits with results within 1 Day(s) from this visit. Latest known visit with results is:   Routine Prenatal on 2019   Component Date Value    Group B Strep Culture 2019 No Group B Beta Strep isolated        Assessment/Plan:   35 y.o.  at 37w6d EGA     1. Encounter for prenatal care in third trimester of first pregnancy      2. Elderly primigravida in third trimester      3. Pre-existing hypertension during pregnancy, antepartum, unspecified pre-existing hypertension type      4. Anemia during pregnancy in third trimester      5.  Mild intermittent asthma without complication          Ruben Bartholomew DO

## 2019-06-03 NOTE — PROGRESS NOTES
Davidson bulb out at this time. Dr. Renae Counts aware and will be to [facility] to AROM patient.

## 2019-06-03 NOTE — PROGRESS NOTES
Dr. Sary Johnson at bedside. Plan of care discussed with patient and patient agreeable. SVE at this time 2/60/-2. Cedeño bulb placed by Dr. Sary Johnson. Patient tolerated well.

## 2019-06-03 NOTE — PLAN OF CARE
Problem: Anxiety:  Goal: Level of anxiety will decrease  Description  Level of anxiety will decrease  Outcome: Ongoing     Problem: Breathing Pattern - Ineffective:  Goal: Able to breathe comfortably  Description  Able to breathe comfortably  Outcome: Ongoing     Problem: Fluid Volume - Imbalance:  Goal: Absence of imbalanced fluid volume signs and symptoms  Description  Absence of imbalanced fluid volume signs and symptoms  Outcome: Ongoing     Problem: Infection - Intrapartum Infection:  Goal: Will show no infection signs and symptoms  Description  Will show no infection signs and symptoms  Outcome: Ongoing     Problem: Labor Process - Prolonged:  Goal: Labor progression, first stage, within specified pattern  Description  Labor progression, first stage, within specified pattern  Outcome: Ongoing  Goal: Uterine contractions within specified parameters  Description  Uterine contractions within specified parameters  Outcome: Ongoing     Problem:  Screening:  Goal: Ability to make informed decisions regarding treatment has improved  Description  Ability to make informed decisions regarding treatment has improved  Outcome: Ongoing     Problem: Pain - Acute:  Goal: Able to cope with pain  Description  Able to cope with pain  Outcome: Ongoing     Problem: Tissue Perfusion - Uteroplacental, Altered:  Goal: Absence of abnormal fetal heart rate pattern  Description  Absence of abnormal fetal heart rate pattern  Outcome: Ongoing     Problem: Urinary Retention:  Goal: Experiences of bladder distention will decrease  Description  Experiences of bladder distention will decrease  Outcome: Ongoing  Goal: Urinary elimination within specified parameters  Description  Urinary elimination within specified parameters  Outcome: Ongoing

## 2019-06-03 NOTE — PROGRESS NOTES
Dr. Blane Corbett at bedside at this time. SVE 5-6 with edematous anterior lip. Will restart pitocin per MD request.  Will continue to monitor patient.

## 2019-06-03 NOTE — PROGRESS NOTES
No vaginal bleeding, no LOF, no regular contractions, +FM  No headaches, vision changes, or right upper quadrant pain. Home blood pressures stable:  130's/80's  Maternal Wellness Questionnaire reviewed--no concerns. NON STRESS TEST    DATE:  5/31/19    : Todd Mcmanus D.O.    COMPARISON:  5/24/19    INDICATION:  AMA, pre-existing HTN    IMPRESSION:  Fetal heart tones: 150's,  NST category: 1, reactive,  Tocometry: no contractions. Diagnosis Orders   1. Encounter for prenatal care in third trimester of first pregnancy     2. Elderly primigravida in third trimester     3. Pre-existing hypertension during pregnancy, antepartum, unspecified pre-existing hypertension type     4. Anemia during pregnancy in third trimester     5.  Mild intermittent asthma without complication       Labor precautions, kick counts  Induction on 6/3/19 at 38 weeks for chronic HTN treated with aldomet

## 2019-06-04 VITALS — DIASTOLIC BLOOD PRESSURE: 77 MMHG | SYSTOLIC BLOOD PRESSURE: 132 MMHG | OXYGEN SATURATION: 100 %

## 2019-06-04 LAB
A/G RATIO: 1 (ref 1.1–2.2)
ALBUMIN SERPL-MCNC: 2.8 G/DL (ref 3.4–5)
ALP BLD-CCNC: 88 U/L (ref 40–129)
ALT SERPL-CCNC: 15 U/L (ref 10–40)
ANION GAP SERPL CALCULATED.3IONS-SCNC: 15 MMOL/L (ref 3–16)
AST SERPL-CCNC: 18 U/L (ref 15–37)
BANDED NEUTROPHILS RELATIVE PERCENT: 24 % (ref 0–7)
BASOPHILS ABSOLUTE: 0 K/UL (ref 0–0.2)
BASOPHILS RELATIVE PERCENT: 0 %
BILIRUB SERPL-MCNC: 0.5 MG/DL (ref 0–1)
BUN BLDV-MCNC: 6 MG/DL (ref 7–20)
CALCIUM SERPL-MCNC: 9.6 MG/DL (ref 8.3–10.6)
CHLORIDE BLD-SCNC: 105 MMOL/L (ref 99–110)
CO2: 17 MMOL/L (ref 21–32)
CREAT SERPL-MCNC: 0.7 MG/DL (ref 0.6–1.1)
EOSINOPHILS ABSOLUTE: 0 K/UL (ref 0–0.6)
EOSINOPHILS RELATIVE PERCENT: 0 %
GFR AFRICAN AMERICAN: >60
GFR NON-AFRICAN AMERICAN: >60
GLOBULIN: 2.9 G/DL
GLUCOSE BLD-MCNC: 125 MG/DL (ref 70–99)
HCT VFR BLD CALC: 38.6 % (ref 36–48)
HEMOGLOBIN: 12.9 G/DL (ref 12–16)
LACTATE DEHYDROGENASE: 162 U/L (ref 100–190)
LACTIC ACID: 4.9 MMOL/L (ref 0.4–2)
LYMPHOCYTES ABSOLUTE: 0.5 K/UL (ref 1–5.1)
LYMPHOCYTES RELATIVE PERCENT: 3 %
MCH RBC QN AUTO: 32.6 PG (ref 26–34)
MCHC RBC AUTO-ENTMCNC: 33.5 G/DL (ref 31–36)
MCV RBC AUTO: 97.4 FL (ref 80–100)
MONOCYTES ABSOLUTE: 0.7 K/UL (ref 0–1.3)
MONOCYTES RELATIVE PERCENT: 4 %
NEUTROPHILS ABSOLUTE: 15.9 K/UL (ref 1.7–7.7)
NEUTROPHILS RELATIVE PERCENT: 69 %
PDW BLD-RTO: 13.2 % (ref 12.4–15.4)
PLATELET # BLD: 294 K/UL (ref 135–450)
PMV BLD AUTO: 7 FL (ref 5–10.5)
POTASSIUM SERPL-SCNC: 3.8 MMOL/L (ref 3.5–5.1)
RBC # BLD: 3.96 M/UL (ref 4–5.2)
RBC # BLD: NORMAL 10*6/UL
SODIUM BLD-SCNC: 137 MMOL/L (ref 136–145)
TOTAL PROTEIN: 5.7 G/DL (ref 6.4–8.2)
URIC ACID, SERUM: 3.8 MG/DL (ref 2.6–6)
WBC # BLD: 17.1 K/UL (ref 4–11)

## 2019-06-04 PROCEDURE — 3E033VJ INTRODUCTION OF OTHER HORMONE INTO PERIPHERAL VEIN, PERCUTANEOUS APPROACH: ICD-10-PCS | Performed by: OBSTETRICS & GYNECOLOGY

## 2019-06-04 PROCEDURE — 3700000000 HC ANESTHESIA ATTENDED CARE: Performed by: OBSTETRICS & GYNECOLOGY

## 2019-06-04 PROCEDURE — 6370000000 HC RX 637 (ALT 250 FOR IP): Performed by: OBSTETRICS & GYNECOLOGY

## 2019-06-04 PROCEDURE — 6360000002 HC RX W HCPCS: Performed by: OBSTETRICS & GYNECOLOGY

## 2019-06-04 PROCEDURE — 3700000001 HC ADD 15 MINUTES (ANESTHESIA): Performed by: OBSTETRICS & GYNECOLOGY

## 2019-06-04 PROCEDURE — 2500000003 HC RX 250 WO HCPCS: Performed by: NURSE ANESTHETIST, CERTIFIED REGISTERED

## 2019-06-04 PROCEDURE — 7100000000 HC PACU RECOVERY - FIRST 15 MIN: Performed by: OBSTETRICS & GYNECOLOGY

## 2019-06-04 PROCEDURE — 80053 COMPREHEN METABOLIC PANEL: CPT

## 2019-06-04 PROCEDURE — 36415 COLL VENOUS BLD VENIPUNCTURE: CPT

## 2019-06-04 PROCEDURE — 51701 INSERT BLADDER CATHETER: CPT

## 2019-06-04 PROCEDURE — 2580000003 HC RX 258: Performed by: OBSTETRICS & GYNECOLOGY

## 2019-06-04 PROCEDURE — 84550 ASSAY OF BLOOD/URIC ACID: CPT

## 2019-06-04 PROCEDURE — 59510 CESAREAN DELIVERY: CPT | Performed by: OBSTETRICS & GYNECOLOGY

## 2019-06-04 PROCEDURE — 83605 ASSAY OF LACTIC ACID: CPT

## 2019-06-04 PROCEDURE — 10907ZC DRAINAGE OF AMNIOTIC FLUID, THERAPEUTIC FROM PRODUCTS OF CONCEPTION, VIA NATURAL OR ARTIFICIAL OPENING: ICD-10-PCS | Performed by: OBSTETRICS & GYNECOLOGY

## 2019-06-04 PROCEDURE — 2709999900 HC NON-CHARGEABLE SUPPLY: Performed by: OBSTETRICS & GYNECOLOGY

## 2019-06-04 PROCEDURE — 7100000001 HC PACU RECOVERY - ADDTL 15 MIN: Performed by: OBSTETRICS & GYNECOLOGY

## 2019-06-04 PROCEDURE — 85025 COMPLETE CBC W/AUTO DIFF WBC: CPT

## 2019-06-04 PROCEDURE — 3609079900 HC CESAREAN SECTION: Performed by: OBSTETRICS & GYNECOLOGY

## 2019-06-04 PROCEDURE — 6360000002 HC RX W HCPCS: Performed by: NURSE ANESTHETIST, CERTIFIED REGISTERED

## 2019-06-04 PROCEDURE — 1220000000 HC SEMI PRIVATE OB R&B

## 2019-06-04 PROCEDURE — 2500000003 HC RX 250 WO HCPCS: Performed by: OBSTETRICS & GYNECOLOGY

## 2019-06-04 PROCEDURE — 83615 LACTATE (LD) (LDH) ENZYME: CPT

## 2019-06-04 RX ORDER — MORPHINE SULFATE 0.5 MG/ML
INJECTION, SOLUTION EPIDURAL; INTRATHECAL; INTRAVENOUS PRN
Status: DISCONTINUED | OUTPATIENT
Start: 2019-06-04 | End: 2019-06-04 | Stop reason: SDUPTHER

## 2019-06-04 RX ORDER — DOCUSATE SODIUM 100 MG/1
100 CAPSULE, LIQUID FILLED ORAL 2 TIMES DAILY
Status: DISCONTINUED | OUTPATIENT
Start: 2019-06-04 | End: 2019-06-04 | Stop reason: SDUPTHER

## 2019-06-04 RX ORDER — CARBOPROST TROMETHAMINE 250 UG/ML
INJECTION, SOLUTION INTRAMUSCULAR
Status: DISCONTINUED
Start: 2019-06-04 | End: 2019-06-04

## 2019-06-04 RX ORDER — NALOXONE HYDROCHLORIDE 0.4 MG/ML
0.4 INJECTION, SOLUTION INTRAMUSCULAR; INTRAVENOUS; SUBCUTANEOUS PRN
Status: DISCONTINUED | OUTPATIENT
Start: 2019-06-04 | End: 2019-06-07 | Stop reason: HOSPADM

## 2019-06-04 RX ORDER — LANOLIN 100 %
OINTMENT (GRAM) TOPICAL
Status: DISCONTINUED | OUTPATIENT
Start: 2019-06-04 | End: 2019-06-07 | Stop reason: HOSPADM

## 2019-06-04 RX ORDER — ONDANSETRON 2 MG/ML
INJECTION INTRAMUSCULAR; INTRAVENOUS
Status: COMPLETED
Start: 2019-06-04 | End: 2019-06-04

## 2019-06-04 RX ORDER — TRISODIUM CITRATE DIHYDRATE AND CITRIC ACID MONOHYDRATE 500; 334 MG/5ML; MG/5ML
30 SOLUTION ORAL ONCE
Status: COMPLETED | OUTPATIENT
Start: 2019-06-04 | End: 2019-06-04

## 2019-06-04 RX ORDER — DEXTROSE MONOHYDRATE 100 MG/ML
6.4 INJECTION, SOLUTION INTRAVENOUS ONCE
Status: DISCONTINUED | OUTPATIENT
Start: 2019-06-04 | End: 2019-06-07 | Stop reason: HOSPADM

## 2019-06-04 RX ORDER — PROMETHAZINE HYDROCHLORIDE 25 MG/ML
INJECTION, SOLUTION INTRAMUSCULAR; INTRAVENOUS PRN
Status: DISCONTINUED | OUTPATIENT
Start: 2019-06-04 | End: 2019-06-04 | Stop reason: SDUPTHER

## 2019-06-04 RX ORDER — METOCLOPRAMIDE HYDROCHLORIDE 5 MG/ML
INJECTION INTRAMUSCULAR; INTRAVENOUS
Status: COMPLETED
Start: 2019-06-04 | End: 2019-06-04

## 2019-06-04 RX ORDER — METOCLOPRAMIDE HYDROCHLORIDE 5 MG/ML
INJECTION INTRAMUSCULAR; INTRAVENOUS PRN
Status: DISCONTINUED | OUTPATIENT
Start: 2019-06-04 | End: 2019-06-04 | Stop reason: SDUPTHER

## 2019-06-04 RX ORDER — LIDOCAINE HYDROCHLORIDE AND EPINEPHRINE BITARTRATE 20; .01 MG/ML; MG/ML
INJECTION, SOLUTION SUBCUTANEOUS PRN
Status: DISCONTINUED | OUTPATIENT
Start: 2019-06-04 | End: 2019-06-04 | Stop reason: SDUPTHER

## 2019-06-04 RX ORDER — SODIUM CHLORIDE 0.9 % (FLUSH) 0.9 %
10 SYRINGE (ML) INJECTION PRN
Status: DISCONTINUED | OUTPATIENT
Start: 2019-06-04 | End: 2019-06-04 | Stop reason: SDUPTHER

## 2019-06-04 RX ORDER — IBUPROFEN 800 MG/1
800 TABLET ORAL EVERY 8 HOURS
Status: DISCONTINUED | OUTPATIENT
Start: 2019-06-04 | End: 2019-06-07 | Stop reason: HOSPADM

## 2019-06-04 RX ORDER — SODIUM CHLORIDE, SODIUM LACTATE, POTASSIUM CHLORIDE, CALCIUM CHLORIDE 600; 310; 30; 20 MG/100ML; MG/100ML; MG/100ML; MG/100ML
INJECTION, SOLUTION INTRAVENOUS CONTINUOUS
Status: DISCONTINUED | OUTPATIENT
Start: 2019-06-04 | End: 2019-06-07 | Stop reason: HOSPADM

## 2019-06-04 RX ORDER — OXYTOCIN 10 [USP'U]/ML
INJECTION, SOLUTION INTRAMUSCULAR; INTRAVENOUS PRN
Status: DISCONTINUED | OUTPATIENT
Start: 2019-06-04 | End: 2019-06-04 | Stop reason: SDUPTHER

## 2019-06-04 RX ORDER — SODIUM CHLORIDE 0.9 % (FLUSH) 0.9 %
10 SYRINGE (ML) INJECTION EVERY 12 HOURS SCHEDULED
Status: DISCONTINUED | OUTPATIENT
Start: 2019-06-04 | End: 2019-06-07 | Stop reason: HOSPADM

## 2019-06-04 RX ORDER — ACETAMINOPHEN 325 MG/1
650 TABLET ORAL EVERY 4 HOURS PRN
Status: DISCONTINUED | OUTPATIENT
Start: 2019-06-04 | End: 2019-06-07 | Stop reason: HOSPADM

## 2019-06-04 RX ORDER — SODIUM CHLORIDE 0.9 % (FLUSH) 0.9 %
10 SYRINGE (ML) INJECTION PRN
Status: DISCONTINUED | OUTPATIENT
Start: 2019-06-04 | End: 2019-06-07 | Stop reason: HOSPADM

## 2019-06-04 RX ORDER — SODIUM CHLORIDE 0.9 % (FLUSH) 0.9 %
10 SYRINGE (ML) INJECTION EVERY 12 HOURS SCHEDULED
Status: DISCONTINUED | OUTPATIENT
Start: 2019-06-04 | End: 2019-06-04 | Stop reason: SDUPTHER

## 2019-06-04 RX ORDER — EPHEDRINE SULFATE 50 MG/ML
INJECTION, SOLUTION INTRAVENOUS PRN
Status: DISCONTINUED | OUTPATIENT
Start: 2019-06-04 | End: 2019-06-04 | Stop reason: SDUPTHER

## 2019-06-04 RX ORDER — OXYCODONE HYDROCHLORIDE AND ACETAMINOPHEN 5; 325 MG/1; MG/1
1 TABLET ORAL EVERY 4 HOURS PRN
Status: DISCONTINUED | OUTPATIENT
Start: 2019-06-04 | End: 2019-06-07 | Stop reason: HOSPADM

## 2019-06-04 RX ORDER — KETOROLAC TROMETHAMINE 30 MG/ML
30 INJECTION, SOLUTION INTRAMUSCULAR; INTRAVENOUS EVERY 6 HOURS PRN
Status: DISPENSED | OUTPATIENT
Start: 2019-06-04 | End: 2019-06-05

## 2019-06-04 RX ADMIN — SODIUM CHLORIDE, POTASSIUM CHLORIDE, SODIUM LACTATE AND CALCIUM CHLORIDE: 600; 310; 30; 20 INJECTION, SOLUTION INTRAVENOUS at 06:25

## 2019-06-04 RX ADMIN — EPHEDRINE SULFATE 10 MG: 50 INJECTION INTRAMUSCULAR; INTRAVENOUS; SUBCUTANEOUS at 06:09

## 2019-06-04 RX ADMIN — FAMOTIDINE: 10 INJECTION INTRAVENOUS at 06:00

## 2019-06-04 RX ADMIN — Medication 1 MILLI-UNITS/MIN: at 10:18

## 2019-06-04 RX ADMIN — PROMETHAZINE HYDROCHLORIDE 12.5 MG: 25 INJECTION INTRAMUSCULAR; INTRAVENOUS at 07:01

## 2019-06-04 RX ADMIN — SODIUM CHLORIDE, POTASSIUM CHLORIDE, SODIUM LACTATE AND CALCIUM CHLORIDE: 600; 310; 30; 20 INJECTION, SOLUTION INTRAVENOUS at 06:45

## 2019-06-04 RX ADMIN — SODIUM CHLORIDE, POTASSIUM CHLORIDE, SODIUM LACTATE AND CALCIUM CHLORIDE: 600; 310; 30; 20 INJECTION, SOLUTION INTRAVENOUS at 01:43

## 2019-06-04 RX ADMIN — OXYTOCIN 20 UNITS: 10 INJECTION INTRAVENOUS at 06:25

## 2019-06-04 RX ADMIN — METOCLOPRAMIDE HYDROCHLORIDE 10 MG: 5 INJECTION INTRAMUSCULAR; INTRAVENOUS at 05:55

## 2019-06-04 RX ADMIN — SODIUM CHLORIDE, POTASSIUM CHLORIDE, SODIUM LACTATE AND CALCIUM CHLORIDE: 600; 310; 30; 20 INJECTION, SOLUTION INTRAVENOUS at 19:15

## 2019-06-04 RX ADMIN — KETOROLAC TROMETHAMINE 30 MG: 30 INJECTION, SOLUTION INTRAMUSCULAR at 19:15

## 2019-06-04 RX ADMIN — SODIUM CITRATE AND CITRIC ACID MONOHYDRATE 30 ML: 500; 334 SOLUTION ORAL at 06:00

## 2019-06-04 RX ADMIN — METHYLDOPA 500 MG: 500 TABLET, FILM COATED ORAL at 21:35

## 2019-06-04 RX ADMIN — Medication 2 G: at 06:04

## 2019-06-04 RX ADMIN — LIDOCAINE HYDROCHLORIDE AND EPINEPHRINE 15 ML: 20; 10 INJECTION, SOLUTION INFILTRATION; PERINEURAL at 05:58

## 2019-06-04 RX ADMIN — ONDANSETRON 4 MG: 2 INJECTION INTRAMUSCULAR; INTRAVENOUS at 05:55

## 2019-06-04 RX ADMIN — MORPHINE SULFATE 2.5 MG: 0.5 INJECTION, SOLUTION EPIDURAL; INTRATHECAL; INTRAVENOUS at 06:24

## 2019-06-04 RX ADMIN — OXYTOCIN 10 UNITS: 10 INJECTION INTRAVENOUS at 06:45

## 2019-06-04 RX ADMIN — ONDANSETRON 4 MG: 2 INJECTION INTRAMUSCULAR; INTRAVENOUS at 02:31

## 2019-06-04 RX ADMIN — EPHEDRINE SULFATE 10 MG: 50 INJECTION INTRAMUSCULAR; INTRAVENOUS; SUBCUTANEOUS at 06:03

## 2019-06-04 RX ADMIN — FAMOTIDINE 20 MG: 20 TABLET, FILM COATED ORAL at 05:55

## 2019-06-04 RX ADMIN — SODIUM CHLORIDE, POTASSIUM CHLORIDE, SODIUM LACTATE AND CALCIUM CHLORIDE: 600; 310; 30; 20 INJECTION, SOLUTION INTRAVENOUS at 08:00

## 2019-06-04 RX ADMIN — AZITHROMYCIN MONOHYDRATE 500 MG: 500 INJECTION, POWDER, LYOPHILIZED, FOR SOLUTION INTRAVENOUS at 06:03

## 2019-06-04 RX ADMIN — KETOROLAC TROMETHAMINE 30 MG: 30 INJECTION, SOLUTION INTRAMUSCULAR at 09:16

## 2019-06-04 RX ADMIN — METHYLDOPA 500 MG: 500 TABLET, FILM COATED ORAL at 09:16

## 2019-06-04 RX ADMIN — HYDROMORPHONE HYDROCHLORIDE 0.25 MG: 1 INJECTION, SOLUTION INTRAMUSCULAR; INTRAVENOUS; SUBCUTANEOUS at 09:17

## 2019-06-04 ASSESSMENT — PULMONARY FUNCTION TESTS
PIF_VALUE: 0

## 2019-06-04 ASSESSMENT — PAIN SCALES - GENERAL
PAINLEVEL_OUTOF10: 10
PAINLEVEL_OUTOF10: 2

## 2019-06-04 NOTE — PROGRESS NOTES
Labor Progress Note  [facility]    Subjective: Pt seen and examined. Doing well, no concerns/complaints. Nurse called to notify of axillary temp 100.3F, retaken as 99.6F. Pt admits to increased pelvic pressure. Objective:  /73   Pulse 100   Temp 99.6 °F (37.6 °C) (Axillary)   Resp 16   Ht 5' 2\" (1.575 m)   Wt 187 lb (84.8 kg)   LMP 08/07/2018   BMI 34.20 kg/m²     Cervix:  Dilation (cm): 9  Effacement (%): 80  Cervical Characteristics: Edematous(Anterior)  Station: -1  Presentation: Vertex  OB Examiner: Matt Katz    FHTs: 150 baseline, mod earnestine, +accels, occasional late and early decels  [place]: q 3 min    Intermittent Cat 2 FHT    Infusions:    lactated ringers 125 mL/hr at 06/03/19 1741    oxytocin 4 sheridan-units/min (06/03/19 2132)    oxytocin       ?   Assessment/Plan:  - continue IV Pitocin per protocol   - Intermittent Cat 2 FHT with cervical change, overall reassuring fetal status   - BP WNL, plan to hold evening Aldomet dose until after delivery (first dose given late this afternoon)   - will continue close monitoring of fetal / maternal status     Jennifer Salcedo,

## 2019-06-04 NOTE — PROGRESS NOTES
Pt pushing for 2.5 hrs   Baby at 0 to +1 station  Reassuring FHT   IV Pitocin at 2018 [facility] 98F     Gave patient the option to proceed with PLTCS if maternal exhaustion sets in, pt declines at this time. Will proceed with continued directed pushing.      Kaylie

## 2019-06-04 NOTE — LACTATION NOTE
This note was copied from a baby's chart. Lactation Progress Note      Data:   RN requesting [address] [facility] assistance with first feed after c/sec. Mom is a 1/0. Baby was transitioned in SCN and had glucose gel x 1 for low blood sugar. Baby now out to room. Action: Baby placed skin to skin at breast. Drops of colostrum expressed to encourage feed. Baby rooting and good latch achieved. Baby on and off with good suck bursts for 20 min feed. Many drops of colostrum also expressed into mouth. BF education initiated but mom is groggy so will need f/u. [address] [facility] number on board and encouraged to call for f/u prn. Response: Groggy but pleased with feed.

## 2019-06-04 NOTE — PROGRESS NOTES
notified of maternal chills, recent temp, and maternal/fetal HR. Verbal orders to recheck temp in 30 minutes- 1 hour.

## 2019-06-04 NOTE — PLAN OF CARE
Problem: Anxiety:  Goal: Level of anxiety will decrease  6/3/2019 2231 by Hang Schmid RN  Outcome: Ongoing  6/3/2019 0936 by Lon Presley RN  Outcome: Ongoing     Problem: Breathing Pattern - Ineffective:  Goal: Able to breathe comfortably  6/3/2019 2231 by Hang Schmid RN  Outcome: Ongoing  6/3/2019 0936 by Lon Presley RN  Outcome: Ongoing     Problem: Fluid Volume - Imbalance:  Goal: Absence of imbalanced fluid volume signs and symptoms  6/3/2019 2231 by Hang Schmid RN  Outcome: Ongoing  6/3/2019 0936 by Lon Presley RN  Outcome: Ongoing  Goal: Absence of intrapartum hemorrhage signs and symptoms  Outcome: Ongoing     Problem: Infection - Intrapartum Infection:  Goal: Will show no infection signs and symptoms  6/3/2019 2231 by Hang Schmid RN  Outcome: Ongoing  6/3/2019 0936 by Lon Presley RN  Outcome: Ongoing     Problem: Labor Process - Prolonged:  Goal: Labor progression, first stage, within specified pattern  6/3/2019 2231 by Hang Schmid RN  Outcome: Ongoing  6/3/2019 0936 by Lon Presley RN  Outcome: Ongoing  Goal: Uterine contractions within specified parameters  6/3/2019 2231 by Hang Schmid RN  Outcome: Ongoing  6/3/2019 0936 by Lon Presley RN  Outcome: Ongoing

## 2019-06-04 NOTE — PLAN OF CARE
Problem: Breathing Pattern - Ineffective:  Goal: Able to breathe comfortably  Description  Able to breathe comfortably  6/4/2019 0840 by Giovana Cox RN  Outcome: Met This Shift  6/3/2019 2231 by Can Kumar RN  Outcome: Ongoing

## 2019-06-04 NOTE — PROGRESS NOTES
Labor Progress Note  [facility]    Subjective: Pt seen and examined. Actively pushing with patient in second stage of labor with good maternal effort. Pt admits to worsening exhaustion (has been pushing x 3 hrs at this point). Objective:  /89   Pulse 101   Temp 98.7 °F (37.1 °C) (Oral)   Resp 18   Ht 5' 2\" (1.575 m)   Wt 187 lb (84.8 kg)   LMP 2018   BMI 34.20 kg/m²     Cervix:  Dilation (cm): 10  Dilation Complete Date: 19  Dilation Complete Time: 012  Effacement (%): 90  Cervical Characteristics: Edematous(Anterior)  Station: 0  Presentation: Vertex  OB Examiner: Isidra Adler    FHTs: 140 baseline, mod earnestine, +accels, neg decels  [place]: q 3-4 min    Infusions:    lactated ringers 125 mL/hr at 19 0143    oxytocin 6 sheridan-units/min (19 0500)    oxytocin       ? Assessment/Plan:  1. 43yo  at 38+0 EGA, MIOL for CHTN   - s/p Cedeño bulb and IV pitocin   - BP WNL, asymptomatic     2. Failure to Descend  - minimal improvement in fetal station with good maternal effort and regular contractions   - suspect persistent OP presentation, difficult to ascertain with capit   - offered pt 1) continued pushing, 2) rest or 3) PLTCS. After reviewing risks / benefits and alternatives, pt would like to proceed with CS.   - Consents signed in room     3. Intrapartum low grade fever   - axillary of 100.3F (2102), repeats 99.6, 100.1, 98.6, 98.4    - absent abdominal tenderness / purulent discharge / fetal tachycardia     3. AMA    4. Anemia     5.  Mild Intermittent Asthma     Ruma Araseli, DO

## 2019-06-04 NOTE — PROGRESS NOTES
Delayed Entry:     Pt doing well In second stage, pushing x 1 hr  Fetal station (-) 1   FHT Cat 1   BP WNL     ALH

## 2019-06-04 NOTE — PROGRESS NOTES
Labor Progress Note  [facility]    Subjective: Pt seen and examined. Doing well, admits to nausea and increased pelvic pressure with contractions, not persistent. Objective:  /76   Pulse 98   Temp 98.6 °F (37 °C) (Axillary)   Resp (P) 18   Ht 5' 2\" (1.575 m)   Wt 187 lb (84.8 kg)   LMP 08/07/2018   BMI 34.20 kg/m²     Cervix:  Dilation (cm): 10  Dilation Complete Date: 06/04/19  Dilation Complete Time: 0126  Effacement (%): 90  Cervical Characteristics: Edematous(Anterior)  Station: -1  Presentation: Vertex  OB Examiner: Bianca Ma    FHTs: 130 baseline, mod earnestine, +accels, neg decels   [place]: q4 min    Cat 1 reactive  Improved with resuscitative efforts (dec pit, turn on side)  Fetus tolerated practice push     Infusions:    lactated ringers 125 mL/hr at 06/03/19 1741    oxytocin 2 sheridan-units/min (06/04/19 0100)    oxytocin       ?   Assessment/Plan:  - pt positioned for second stage of labor   - will start directed pushing with continuous careful monitoring of maternal / fetal status     Elta Ill, DO

## 2019-06-04 NOTE — PROGRESS NOTES
Up to bathroom with assist of two. Ambulated with steady gait. Rosie care done. Cedeño cath emptied of 300mls. New pad and underwear on. Gown changed. Linens changed with comfort pad placed. Ambulated back to rocking chair. Tolerated well. Instructed to call for assistance when ready to get back to bed. Verbalizes understanding.  at bedside and supportive.

## 2019-06-04 NOTE — PLAN OF CARE
cope with pain  6/4/2019 0842 by Evelin Herrera RN  Outcome: Ongoing  6/4/2019 0840 by Evelin Herrera RN  Outcome: Ongoing  6/3/2019 2231 by Zulema Glez RN  Outcome: Ongoing     Problem: Urinary Retention:  Goal: Experiences of bladder distention will decrease  Description  Experiences of bladder distention will decrease  6/4/2019 0842 by Evelin Herrera RN  Outcome: Ongoing  6/4/2019 0840 by Evelin Herrera RN  Outcome: Ongoing  Goal: Urinary elimination within specified parameters  Description  Urinary elimination within specified parameters  6/4/2019 0842 by Evelin Herrera RN  Outcome: Ongoing  6/4/2019 0840 by Evelin Herrera RN  Outcome: Ongoing     Problem: Pain:  Goal: Pain level will decrease  Description  Pain level will decrease  6/4/2019 0842 by Evelin Herrera RN  Outcome: Ongoing  6/4/2019 0840 by Evelin Herrera RN  Outcome: Ongoing  6/3/2019 2231 by Zulema Glez RN  Outcome: Ongoing  Goal: Control of acute pain  Description  Control of acute pain  6/4/2019 0842 by Evelin Herrera RN  Outcome: Ongoing  6/4/2019 0840 by Evelin Herrera RN  Outcome: Ongoing  6/3/2019 2231 by Zulema Glez RN  Outcome: Ongoing  Goal: Control of chronic pain  Description  Control of chronic pain  6/4/2019 0842 by Evelin Herrera RN  Outcome: Ongoing  6/4/2019 0840 by Evelin Herrera RN  Outcome: Ongoing     Problem: Discharge Planning:  Goal: Discharged to appropriate level of care  Description  Discharged to appropriate level of care  Outcome: Ongoing     Problem: Mood - Altered:  Goal: Mood stable  Description  Mood stable  Outcome: Ongoing     Problem: Nausea/Vomiting:  Goal: Absence of nausea/vomiting  Description  Absence of nausea/vomiting  Outcome: Ongoing     Problem: Venous Thromboembolism:  Goal: Will show no signs or symptoms of venous thromboembolism  Description  Will show no signs or symptoms of venous thromboembolism  Outcome: Ongoing  Goal: Absence of signs or symptoms of impaired coagulation  Description  Absence of signs or symptoms of impaired coagulation  Outcome: Ongoing

## 2019-06-04 NOTE — BRIEF OP NOTE
Brief Postoperative Note  ______________________________________________________________    Patient: Mounika Logan  YOB: 1980  MRN: [id number]  Date of Procedure: 2019    Pre-Op Diagnosis: Failure to descend         MIOL at 38 wks due to [facility]           AMA         Anemia          Asthma                      Intrapartum low grade fever     Post-Op Diagnosis: Same  Persistent OP presentation        Procedure(s):   SECTION (PLTCS)     Anesthesia: Epidural    Surgeon(s):  Ignacio Castellon DO    Assistant: Pamela Mcmanus DO     Estimated Blood Loss (mL): 859 mL     Complications: None    Specimens:   - placenta  - umbilical cord   - cord blood       Drains:   Urethral Catheter Non-latex 16 fr (Active)       Findings:   1) Fetus in persistent OP position, delivered in LOT position   2) Normal appearing uterus (with small posterior fibroids), Tubes and ovaries   3) Clear amniotic fluid   4) Normal appearing / intact placenta  5) 3 Vessel Cord     Fetal Findings:   Viable male infant   APGARS 7, 8   Delivery Time 19 at [facility] #    [id number]      Ignacio Castellon DO  Date: 2019  Time: 7:27 AM

## 2019-06-04 NOTE — LACTATION NOTE
This note was copied from a baby's chart. Lactation Progress Note      Data:    RN requests f/u on primip breast feeder, who delivered this morning. Infant was transferred to the [facility] to treat persistant hypoglycemia in room with mom despite breast feeding, glucose gel given 2x, and formula supplementation. Action: Hospital grade breast pump and pumping supplies brought to bedside. Instructed pt on set up and use of breast pump with premie+ setting and assisted with first pumping session. Instructed pt that pumping should not be painful, educating on appropriate flange fit, and how to adjust pump suction to ensure pumping on a comfortable setting. Educated mom on what to expect with volumes expressed while pumping, explaining that colostrum is very thick, small in volume, and may be difficult for the pump to express. Reassured pt that she may not express much until her thinner mature milk is in. Explained the importance to pump regardless of volumes expressed to protect and establish a good milk supply. Reviewed when to expect mature milk production to begin. Encouraged breast massage/compressions while pumping, and hand expression of colostrum to support pumping and milk supply. Encouraged pt to pump q3h x 15 minutes using the premie+ setting. Instructed that she should have a minimum of 8 pumping sessions in a 24 hour period. Reviewed collection, storage and preparation of expressed breast milk/colostrum as well as how to appropriately clean pump equipment. Encouraged much STS, offering the breast to baby in [facility] often and when able. Breast feeding and pumping education reviewed. No colostrum expressed with pumping session. Instructed how to hand express. Encouraged to take any drops expressed to [facility] promptly to prevent drying. Reassured normal regarding lack of colostrum expressed at this time. Encouraged to rest, and pumping schedule placed on whiteboard for patient. Name and number provided on whiteboard. Instructed how to contact for f/u support and assistance with pumping, latching, and breast feeding as needed. Response: Maternal fatigue, confirmed pumping was comfortable and verbalized understanding of instruction provided. Will call for f/u support as needed.

## 2019-06-05 LAB
A/G RATIO: 0.9 (ref 1.1–2.2)
ALBUMIN SERPL-MCNC: 2.5 G/DL (ref 3.4–5)
ALP BLD-CCNC: 70 U/L (ref 40–129)
ALT SERPL-CCNC: 12 U/L (ref 10–40)
ANION GAP SERPL CALCULATED.3IONS-SCNC: 8 MMOL/L (ref 3–16)
AST SERPL-CCNC: 16 U/L (ref 15–37)
BILIRUB SERPL-MCNC: <0.2 MG/DL (ref 0–1)
BUN BLDV-MCNC: 7 MG/DL (ref 7–20)
CALCIUM SERPL-MCNC: 9.2 MG/DL (ref 8.3–10.6)
CHLORIDE BLD-SCNC: 109 MMOL/L (ref 99–110)
CO2: 21 MMOL/L (ref 21–32)
CREAT SERPL-MCNC: <0.5 MG/DL (ref 0.6–1.1)
GFR AFRICAN AMERICAN: >60
GFR NON-AFRICAN AMERICAN: >60
GLOBULIN: 2.9 G/DL
GLUCOSE BLD-MCNC: 96 MG/DL (ref 70–99)
HCT VFR BLD CALC: 30.8 % (ref 36–48)
HEMOGLOBIN: 10.3 G/DL (ref 12–16)
LACTATE DEHYDROGENASE: 158 U/L (ref 100–190)
MCH RBC QN AUTO: 32.3 PG (ref 26–34)
MCHC RBC AUTO-ENTMCNC: 33.4 G/DL (ref 31–36)
MCV RBC AUTO: 96.5 FL (ref 80–100)
PDW BLD-RTO: 13 % (ref 12.4–15.4)
PLATELET # BLD: 298 K/UL (ref 135–450)
PMV BLD AUTO: 6.8 FL (ref 5–10.5)
POTASSIUM SERPL-SCNC: 4.3 MMOL/L (ref 3.5–5.1)
RBC # BLD: 3.19 M/UL (ref 4–5.2)
SODIUM BLD-SCNC: 138 MMOL/L (ref 136–145)
TOTAL PROTEIN: 5.4 G/DL (ref 6.4–8.2)
URIC ACID, SERUM: 4.3 MG/DL (ref 2.6–6)
WBC # BLD: 12.9 K/UL (ref 4–11)

## 2019-06-05 PROCEDURE — 36415 COLL VENOUS BLD VENIPUNCTURE: CPT

## 2019-06-05 PROCEDURE — 6360000002 HC RX W HCPCS: Performed by: OBSTETRICS & GYNECOLOGY

## 2019-06-05 PROCEDURE — 1220000000 HC SEMI PRIVATE OB R&B

## 2019-06-05 PROCEDURE — 2580000003 HC RX 258: Performed by: OBSTETRICS & GYNECOLOGY

## 2019-06-05 PROCEDURE — 84550 ASSAY OF BLOOD/URIC ACID: CPT

## 2019-06-05 PROCEDURE — 6370000000 HC RX 637 (ALT 250 FOR IP): Performed by: OBSTETRICS & GYNECOLOGY

## 2019-06-05 PROCEDURE — 80053 COMPREHEN METABOLIC PANEL: CPT

## 2019-06-05 PROCEDURE — 83615 LACTATE (LD) (LDH) ENZYME: CPT

## 2019-06-05 PROCEDURE — 85027 COMPLETE CBC AUTOMATED: CPT

## 2019-06-05 RX ADMIN — Medication 10 ML: at 21:09

## 2019-06-05 RX ADMIN — METHYLDOPA 500 MG: 500 TABLET, FILM COATED ORAL at 21:09

## 2019-06-05 RX ADMIN — IBUPROFEN 800 MG: 800 TABLET, FILM COATED ORAL at 23:58

## 2019-06-05 RX ADMIN — DOCUSATE SODIUM 100 MG: 100 CAPSULE, LIQUID FILLED ORAL at 09:49

## 2019-06-05 RX ADMIN — KETOROLAC TROMETHAMINE 30 MG: 30 INJECTION, SOLUTION INTRAMUSCULAR at 01:18

## 2019-06-05 RX ADMIN — METHYLDOPA 500 MG: 500 TABLET, FILM COATED ORAL at 09:49

## 2019-06-05 RX ADMIN — OXYCODONE HYDROCHLORIDE AND ACETAMINOPHEN 1 TABLET: 5; 325 TABLET ORAL at 21:09

## 2019-06-05 RX ADMIN — IBUPROFEN 800 MG: 800 TABLET, FILM COATED ORAL at 09:49

## 2019-06-05 RX ADMIN — DOCUSATE SODIUM 100 MG: 100 CAPSULE, LIQUID FILLED ORAL at 21:09

## 2019-06-05 RX ADMIN — Medication 10 ML: at 09:50

## 2019-06-05 RX ADMIN — IBUPROFEN 800 MG: 800 TABLET, FILM COATED ORAL at 16:00

## 2019-06-05 ASSESSMENT — PAIN SCALES - GENERAL
PAINLEVEL_OUTOF10: 5
PAINLEVEL_OUTOF10: 4
PAINLEVEL_OUTOF10: 2
PAINLEVEL_OUTOF10: 0
PAINLEVEL_OUTOF10: 5

## 2019-06-05 NOTE — ANESTHESIA POSTPROCEDURE EVALUATION
Department of Anesthesiology  Postprocedure Note    Patient: Dione Pulliam  MRN: [id number]  YOB: 1980  Date of evaluation: 2019  Time:  12:13 PM     Procedure Summary     Date:  19 Room / Location:  [facility]  / [facility]    Anesthesia Start:  1433 Anesthesia Stop:  19 8874    Procedures:        SECTION (N/A Abdomen)      Labor Analgesia Diagnosis:  (Failure to descend)    Surgeon:  Crys Dudley DO Responsible Provider:  Romana Fill, MD    Anesthesia Type:  epidural ASA Status:  2 - Emergent          Anesthesia Type: epidural    Renetta Phase I: Renetta Score: 10    Renetta Phase II: Renetta Score: 10    Last vitals: Reviewed and per EMR flowsheets.        Anesthesia Post Evaluation    Patient location during evaluation: bedside  Patient participation: complete - patient participated  Level of consciousness: awake and alert  Nausea & Vomiting: no nausea and no vomiting  Complications: no  Cardiovascular status: hemodynamically stable  Hydration status: stable

## 2019-06-05 NOTE — PROGRESS NOTES
GLOB 2.9 06/05/2019     Lab Results   Component Value Date    LABURIC 4.3 06/05/2019       Assessment / Plan:  1. Dione Pulliam is a 35 y.o. [id number] s/p uncomplicated PLTCS at 04M4J, POD #1.      - Adequate pain control     - Voiding without difficulty     - Tolerating regular diet  2. CHTN     - Bps stable: 106-148/56-87     - Currently on aldomet 500mg BID     - Asymptomatic     - PIH labs stable  3. Anemia     - H/H: 10.3/30.8 from 12.9/38.6     - Patient is asymptomatic   4. Asthma     - Stable, asymptomatic     Plan:   1. Continue routine postpartum care  2. Continue close monitoring of BP  3. Continued breastfeeding support  4. Encourage IS use  5. Encourage ambulation  6.  Continue pain control    Alys Babinski, DO

## 2019-06-05 NOTE — LACTATION NOTE
This note was copied from a baby's chart. Lactation Progress Note      Data:    RN requests f/u support to assist with breast feeding. Action: Baby placed STS with mom, and assisted with and instructed on good position, breast support and tips for RADHA. Infant sleepy at the breast. Encouraged pt to hand express drops, after a few attempts, large drops expressed easily and fed to . As mom hand expresses drops of colostrum for baby, baby is beginning to wake and show signs of hunger. Infant with many unsuccessful attempts to latch after several minutes of working with him. Offered to use a nipple shield and patient agrees. Shield brought to bedside, and instructed on use and application of shield, as well as tips for deep latch with shield. Infant rooting with wide open mouth, RADHA achieved easily with SRS and AS. Pt denies pain or discomfort with latch, but feels tugging at the breast during feeding. Good 15 minute feeding observed, infant detached from the shield and with feeding cues. Colostrum noted in shield and shown to mom. Infant rooting, and on/off at the breast with suck bursts. Assisted with reposition to the right breast instructing on cross cradle positioning, breast supports and RADHA. Shield applied to nipple after few failed attempts to latch. Infant rooting with wide open mouth, RADHA achieved with shield with SRS and AS. Good 25 minute feeding observed, infant then detached from the breast and without feeding cues. Instructed on tips for burping. Pt repositions baby to her chest for STS and burping. Praise and reassurance given of good feed observed and benefits of STS, encouraging much STS when mom feels up to it. Breast feeding and pumping education reviewed. Encouraged pt to pump after breast feeding once back in her room. Name and number remains available on whiteboard in room. Will call for f/u support as needed. Response: Verbalized understanding of teaching provided.  Pleased with feed. Will call for f/u prn.

## 2019-06-05 NOTE — LACTATION NOTE
This note was copied from a baby's chart. Introduced self to patient as Lactation RN, name and phone number written on white board in room. Infant is in SCN for low blood sugars. Mother is pumping. Mother instructed to call Lactation nurse for F/U care as needed.

## 2019-06-05 NOTE — LACTATION NOTE
This note was copied from a baby's chart. Lactation Progress Note      Data:   F/U on 1/0 breast feeder whose baby is in SCN for unstable blood sugars and abx. Mom states that baby is latching better with a nipple shield. She has been pumping Q 3 H. Action: Offered assistance with breast feeds today as needed. Stressed importance of pumping Q 3 H as long as baby is getting a supplement. Also encouraged plenty of po fluids and calories, as well as, rest. Will f/u prn. Response: Verbalized understanding.

## 2019-06-06 PROCEDURE — 99024 POSTOP FOLLOW-UP VISIT: CPT | Performed by: OBSTETRICS & GYNECOLOGY

## 2019-06-06 PROCEDURE — 1220000000 HC SEMI PRIVATE OB R&B

## 2019-06-06 PROCEDURE — 6370000000 HC RX 637 (ALT 250 FOR IP): Performed by: OBSTETRICS & GYNECOLOGY

## 2019-06-06 RX ADMIN — OXYCODONE HYDROCHLORIDE AND ACETAMINOPHEN 1 TABLET: 5; 325 TABLET ORAL at 16:40

## 2019-06-06 RX ADMIN — METHYLDOPA 500 MG: 500 TABLET, FILM COATED ORAL at 09:05

## 2019-06-06 RX ADMIN — FAMOTIDINE 20 MG: 20 TABLET, FILM COATED ORAL at 21:33

## 2019-06-06 RX ADMIN — IBUPROFEN 800 MG: 800 TABLET, FILM COATED ORAL at 07:53

## 2019-06-06 RX ADMIN — DOCUSATE SODIUM 100 MG: 100 CAPSULE, LIQUID FILLED ORAL at 07:53

## 2019-06-06 RX ADMIN — IBUPROFEN 800 MG: 800 TABLET, FILM COATED ORAL at 18:52

## 2019-06-06 RX ADMIN — OXYCODONE HYDROCHLORIDE AND ACETAMINOPHEN 1 TABLET: 5; 325 TABLET ORAL at 12:30

## 2019-06-06 RX ADMIN — OXYCODONE HYDROCHLORIDE AND ACETAMINOPHEN 1 TABLET: 5; 325 TABLET ORAL at 03:56

## 2019-06-06 RX ADMIN — METHYLDOPA 500 MG: 500 TABLET, FILM COATED ORAL at 21:33

## 2019-06-06 RX ADMIN — DOCUSATE SODIUM 100 MG: 100 CAPSULE, LIQUID FILLED ORAL at 21:33

## 2019-06-06 RX ADMIN — OXYCODONE HYDROCHLORIDE AND ACETAMINOPHEN 1 TABLET: 5; 325 TABLET ORAL at 21:33

## 2019-06-06 ASSESSMENT — PAIN SCALES - GENERAL
PAINLEVEL_OUTOF10: 5
PAINLEVEL_OUTOF10: 7
PAINLEVEL_OUTOF10: 6
PAINLEVEL_OUTOF10: 6

## 2019-06-06 ASSESSMENT — PAIN DESCRIPTION - DESCRIPTORS: DESCRIPTORS: DISCOMFORT;SORE

## 2019-06-06 NOTE — PROGRESS NOTES
Report received from JENNIFER Cannon. Bedside report given. Introduced myself to pt as her RN for the day. I put my name and phone number on the white board and showed pt how to use her room phone to get a hold of me. Pt was given her plan of care for the day. Call light within reach. Bed in lowest position and wheels are locked. Pt verbalized understanding and denies any further needs at this time. Continue to monitor.

## 2019-06-06 NOTE — PROGRESS NOTES
POSTOP DAY #2    Yancy Ku is a 34 y/o [id number] s/p uncomplicated PLTCS for arrest of descent at 38w0d, POD #2. Pregnancy was complicated by gestational hypertension, advanced maternal age, anemia and asthma.       Patient is doing well with no concerns. Pain is well controlled with current regimen. Lochia moderate. Tolerating regular diet without difficulty. Ambulating without difficulty, denies dizziness on standing. Voiding without difficulty. Denies headache, vision changes, RUQ pain. Denies chest pain, shortness of breath, fever, chills, nausea, vomiting. Patient is breast feeding. Objective:  Vitals:    06/06/19 1233   BP: 129/79   Pulse: 83   Resp: 16   Temp: 98.1 °F (36.7 °C)   SpO2:      GENERAL APPEARANCE: alert, well appearing, in no apparent distress, oriented to person, place and time, well hydrated  LUNGS: clear to auscultation, no wheezes, rales or rhonchi, symmetric air entry  HEART: regular rate and rhythm  ABDOMEN POSTPARTUM: soft, NT, ND, fundus firm, incision, clean dry intact, steri strips in place. +BS  EXTREMITIES: no redness or tenderness in the calves or thighs, edema 2+  SKIN: normal coloration and turgor, no rashes  NEUROLOGIC: alert, oriented, normal speech, no focal findings or movement disorder noted  6/5/2019 12:04 PM - Christophe Spencer Incoming Lab Results From Soft (Epic Adt)     Component Value Ref Range & Units Status Collected Lab   WBC 12. 9High   4.0 - 11.0 K/uL Final 06/05/2019 11:54 AM [facility]   RBC 3.19Low   4.00 - 5.20 M/uL Final 06/05/2019 11:54 AM [facility]   Hemoglobin 10.3Low   12.0 - 16.0 g/dL Final 06/05/2019 11:54 AM [facility] Lab   Hematocrit 30.8Low   36.0 - 48.0 % Final 06/05/2019 11:54 AM [facility] Lab   MCV 96.5  80.0 - 100.0 fL Final 06/05/2019 11:54 AM [facility]   [facility] 32.3  26.0 - 34.0 pg Final 06/05/2019 11:54 AM - [facility]   MCHC 33.4  31.0 - 36.0 g/dL Final 2019 11:54 AM - [facility]   RDW 13.0  12.4 - 15.4 % Final 2019 11:54 AM - [facility] K/uL Final 2019 11:54 AM [facility]   MPV 6.8  5.0 - 10.5 fL Final 2019 11:54 AM - [facility]   Testing Performed By     Dakota Hairston Name Director Address Valid Date Range   25-- [facility] [address] Evie Valentin M.D. [place] [address] 17 [phone]-Present   Narrative   Performed by: [facility]   Performed at: Impression:  S/P primary low transverse  POD #2  AMA  Pre-existing hypertension  Gestational hypertension  Anemia      Plan:   See orders and Patient Instructions  Discharge planned for tomorrow.

## 2019-06-07 VITALS
SYSTOLIC BLOOD PRESSURE: 145 MMHG | HEIGHT: 62 IN | OXYGEN SATURATION: 99 % | TEMPERATURE: 98.1 F | WEIGHT: 187 LBS | RESPIRATION RATE: 16 BRPM | HEART RATE: 87 BPM | BODY MASS INDEX: 34.41 KG/M2 | DIASTOLIC BLOOD PRESSURE: 85 MMHG

## 2019-06-07 PROCEDURE — 99999 PR OFFICE/OUTPT VISIT,PROCEDURE ONLY: CPT | Performed by: OBSTETRICS & GYNECOLOGY

## 2019-06-07 PROCEDURE — 6370000000 HC RX 637 (ALT 250 FOR IP): Performed by: OBSTETRICS & GYNECOLOGY

## 2019-06-07 PROCEDURE — 99024 POSTOP FOLLOW-UP VISIT: CPT | Performed by: OBSTETRICS & GYNECOLOGY

## 2019-06-07 RX ORDER — BLOOD PRESSURE TEST KIT
1 KIT MISCELLANEOUS 2 TIMES DAILY
Qty: 1 KIT | Refills: 0 | Status: SHIPPED | OUTPATIENT
Start: 2019-06-07

## 2019-06-07 RX ORDER — IBUPROFEN 800 MG/1
800 TABLET ORAL EVERY 8 HOURS
Qty: 40 TABLET | Refills: 0 | Status: SHIPPED | OUTPATIENT
Start: 2019-06-07 | End: 2020-09-04 | Stop reason: SDUPTHER

## 2019-06-07 RX ORDER — OXYCODONE HYDROCHLORIDE AND ACETAMINOPHEN 5; 325 MG/1; MG/1
1 TABLET ORAL EVERY 6 HOURS PRN
Qty: 28 TABLET | Refills: 0 | Status: SHIPPED | OUTPATIENT
Start: 2019-06-07 | End: 2019-06-10

## 2019-06-07 RX ORDER — PSEUDOEPHEDRINE HCL 30 MG
100 TABLET ORAL 2 TIMES DAILY PRN
Qty: 30 CAPSULE | Refills: 0 | Status: SHIPPED | OUTPATIENT
Start: 2019-06-07 | End: 2019-07-02 | Stop reason: ALTCHOICE

## 2019-06-07 RX ADMIN — DOCUSATE SODIUM 100 MG: 100 CAPSULE, LIQUID FILLED ORAL at 09:21

## 2019-06-07 RX ADMIN — METHYLDOPA 500 MG: 500 TABLET, FILM COATED ORAL at 09:21

## 2019-06-07 RX ADMIN — IBUPROFEN 800 MG: 800 TABLET, FILM COATED ORAL at 02:03

## 2019-06-07 RX ADMIN — OXYCODONE HYDROCHLORIDE AND ACETAMINOPHEN 1 TABLET: 5; 325 TABLET ORAL at 02:02

## 2019-06-07 RX ADMIN — OXYCODONE HYDROCHLORIDE AND ACETAMINOPHEN 1 TABLET: 5; 325 TABLET ORAL at 06:06

## 2019-06-07 RX ADMIN — IBUPROFEN 800 MG: 800 TABLET, FILM COATED ORAL at 11:30

## 2019-06-07 RX ADMIN — OXYCODONE HYDROCHLORIDE AND ACETAMINOPHEN 1 TABLET: 5; 325 TABLET ORAL at 13:33

## 2019-06-07 ASSESSMENT — PAIN DESCRIPTION - DESCRIPTORS
DESCRIPTORS: DISCOMFORT;SORE
DESCRIPTORS: DISCOMFORT;SORE

## 2019-06-07 ASSESSMENT — PAIN SCALES - GENERAL
PAINLEVEL_OUTOF10: 6
PAINLEVEL_OUTOF10: 6
PAINLEVEL_OUTOF10: 5
PAINLEVEL_OUTOF10: 6

## 2019-06-07 NOTE — PROGRESS NOTES
Discharge Phone Call Log  Patient Name: Leandro Halsted     [facility] Care Provider: Reynold Mahoney DO Discharge Date: 2019    Disposition of baby:    Phone Number: [phone] (home)     Attempts to Contact:  Date:    Nurse  Date:    Nurse  Date:    Nurse    Introduce yourself and explain the questionnaire. 1.  Now that you are at home is your pain being well controlled? Y/N   What pain reducing measures are you using? ____________________________________        Information for the patient's :  Madison Julian [[id number]]   Delivery Method: , Low Transverse    2. Are you having any infant feeding issues? Y/N _____________________________      If breastfeeding, were you satisfied with the breastfeeding support services offered? Y/N  3. Any engorgement problems? Y/N  Have you had to supplement? Y/N  4. Have you made or have you already had your first appointment with the baby's doctor? Y/N If no, do you know when to schedule it? Y/N Do you have a safe crib, bassinet or play yard with a firm mattress for your infant to sleep in at home? Y/N  5. Have you scheduled your follow-up appointment? Y/N  If no, do you know when to schedule it? Y/N  6. Did your nurses and physicians include you in the plan of care, communicating with      you respectfully, and in a manner easy to understand? Y/N       Comments:  ___________________________________________________________  7. Is there anyone in particular you would like to mention who provided care for you?          ____________________________    8. Do you have any questions about your discharge instructions or the notebook? Y/N    9. Did your discharge occur in a timely manner? Y/N        Comments: __________________________________________________________    Remember to describe the hospital survey and ask patient to return it when possible.   Questions During Interview:__________________________________________________________  Teaching During interview :___________________________________________________________  ___________________________RN       Date:______________Time:________________

## 2019-06-07 NOTE — LACTATION NOTE
This note was copied from a baby's chart. Lactation Progress Note      Data:     Asked by RN to help patient with getting infant set up with SNS. Infant is awake and active at the breast.    Action: Assisted mother with getting infant placed at the breast. Infant had a good feeding and was able to take all of his supplement. Encouraged mother to call for f/u as needed. Response:  Mother was glad infant did so well at the breast.

## 2019-06-07 NOTE — CARE COORDINATION
Message received, re: Mob with hx of MJ use in pregnancy ([facility].)   Mob, G1L1, delivered CS, baby girl  6/4/19. Both Mob's and infant's UDS negative. (Cord tox pending.)  Per chart review, Mob is , privately insured, no hx of mental health concerns. SW will await infant's cord tox to determine if need for follow up. Infant to d/c home with Mob when stable. Please advise if any other concerns arise.   Nato DEE

## 2019-06-07 NOTE — DISCHARGE SUMMARY
DISCHARGE SUMMARY    Primary Diagnosis: Intrauterine pregnancy at 45 weeks gestation  Secondary Diagnosis:   AMA, Pre-existing hypertension, Gestational hypertension, Anemia  Procedures: primary low transverse   Referrals: lactation  Significant Findings: viable male   Reason for Hospitalization: induction of labor  Complications: none        Discharge Instructions:    Diet:common adult  Activity:activity as tolerated, no heavy lifting or driving for 2 weeks, pelvic rest x 6 weeks. Medications: colace, percocet, ibuprofen.    Disposition: Home  Condition on discharge: Stable  Follow up: in 2 week(s)

## 2019-06-07 NOTE — OP NOTE
[facility]                 Tiesha Heller                                OPERATIVE REPORT    PATIENT NAME: Stone Howell                   :        1980  MED REC NO:   [id number]                          ROOM:       [id number]  ACCOUNT NO:   [de-identified]                           ADMIT DATE: 2019  PROVIDER:     Jamee Simms DO    DATE OF PROCEDURE:  2019    PREOPERATIVE DIAGNOSES:  Failure to descend, medical induction of labor  at 38 weeks gestation due to chronic hypertension. Pregnancy complicated by advanced maternal  age, anemia, asthma and intrapartum low-grade fever. POSTOPERATIVE DIAGNOSES:  Failure to descend, medical induction of labor  at 45 weeks gestation due to chronic hypertension, advanced maternal  age, anemia, asthma and intrapartum low-grade fever with persistent OP  presentation. OPERATION PERFORMED:  Primary low-transverse  section. SURGEON:  Jamee Simms DO    ANESTHESIA:  Epidural.    ASSISTANT:  Dr. Brenden Atkinson. ESTIMATED BLOOD LOSS:  700 mL. COMPLICATIONS:  None. SPECIMENS:  Include placenta, umbilical cord and cord blood. FINDINGS:  Normal-appearing uterus with small posterior  fibroids, tubes and ovaries. Clear amniotic fluid, normal-appearing and  intact placenta and a three-vessel cord. Fetal findings Include a viable male fetus in persistent OP position, delivered in the  LOT position, with Apgars of 7 and 8     Delivery time of 06:23 on 2019. INDICATIONS FOR PROCEDURE:  The patient is a 41-year-old female, who  presented to the Labor and Delivery for medical induction of labor due  to chronic hypertension at 40 and 6 weeks estimated gestational age. Cedeño bulb was inserted and came out in the appropriate fashion. IV  Pitocin was started and adequate contraction developed resulting in  complete cervical dilation.   At this time, the patient pushed for 3  hours with minimal advancement of fetal station. The patient was given  options to proceed with pushing, rest or proceed with primary  low-transverse  section and the patient desired  after  risks, benefits and alternatives were reviewed. OPERATIVE PROCEDURE:  The patient was taken to the operating room where  epidural anesthesia was redosed and found to be adequate. She was  placed on the operating table in the dorsal supine position with a  leftward tilt. She was prepped and draped in the normal sterile  fashion. Universal time-out was conducted and all parties agreed to  accurate information. A Pfannenstiel incision was made in the lower  abdomen with a scalpel and carried down to the fascia. The fascia was  then extended laterally in a curvilinear fashion with Teague scissors. Superior aspect of the fascia was grasped with Kocher clamps and  elevated out of pelvis and the underlying rectus muscles were dissected  off bluntly and sharply with Teague scissors. The inferior aspect of the  fascia was then grasped with Kocher clamps and elevated while the  underlying rectus muscles were dissected off sharply with Teague scissors  to the level of the pubic bone. Peritoneum was then entered bluntly and  extended laterally with manual traction. Uterine incision was then made  with a scalpel in the lower uterine segment. Fetus in persistent OP  position palpated and infant was brought through the uterine  incision after flexion and rotation of the fetal head. The infant was  then delivered in the LOT position followed atraumatically by fetal body  with gentle fundal pressure. The infant's mouth and nose were bulb  suctioned. Cord was clamped and cut and the infant was handed off to  the awaiting nurse and staff. The uterus was then exteriorized and  cleared of all clots and debris.   The placenta was then removed manually  with gentle traction and fundal massage, handed off to the awaiting  Nursing staff and sent to Pathology. Uterine incision was then  reapproximated with a running locked suture of 1 Vicryl. A second  imbricating layer was also performed. Hemostasis was then noted. The  bladder was backfilled with sterile milk to ensure patency, which was  noted. Posterior cul-de-sac was then suction irrigated with warm  normal saline. Uterus, tubes and ovaries were examined and found to be  normal.  Uterus, tubes and ovaries were then gently placed into the  abdomen. The uterine incision was then examined and found to be  hemostatic. The peritoneum was then reapproximated with a 2-0 Vicryl in  a running continuous suture. Perforating vessels were then cauterized  with Bovie cautery along the rectus and the fascia. The fascia was then  reapproximated with a running continuous suture of 1 Vicryl. Subcutaneous tissue was then reapproximated with 3 interrupted sutures of  2-0 Vicryl. Skin was reapproximated with a running subcuticular 3-0  Monocryl. Sterile bandages were applied thereafter. The patient  tolerated the procedure well and was taken to her postoperative room in  stable condition.         [address] [place] [address]    D: 06/06/2019 12:49:47       T: 06/06/2019 20:41:39     [id number]  Job#: [id number]     Doc#: [id number]    CC:

## 2019-06-07 NOTE — LACTATION NOTE
This note was copied from a baby's chart. Lactation Progress Note      Data:    Mother called LC because infant was too sleepy to take entire supplement from SNS. Infant's minimum was increased to 45 mls. Infant took twenty from the SNS and now is sleepy and won't continue. Action: Came in and spoke with mother who is using a nipple shield and SNS for each feeding, mother decided to conserve infant's energy that she would give the rest of the supplement per bottle. Infant did wake up and took the rest of the supplement. Mother encouraged to let infant get good sleep for the next 3 hours to try to improve his time at the breast for the next feeding. Mother encouraged to call for f/u assistance. Response: Mother was glad infant would take the rest of his supplement by bottle.

## 2019-06-07 NOTE — PLAN OF CARE
Problem: Pain - Acute:  Goal: Pain level will decrease  Description  Pain level will decrease  6/6/2019 2249 by Luis Felipe Austin RN  Outcome: Ongoing  6/6/2019 1814 by Elvia Ramires RN  Outcome: Ongoing  6/6/2019 1305 by Karen Kelley RN  Outcome: Ongoing  Goal: Able to cope with pain  Description  Able to cope with pain  6/6/2019 2249 by Luis Felipe Austin RN  Outcome: Ongoing  6/6/2019 1814 by Elvia Ramires RN  Outcome: Ongoing  6/6/2019 1305 by Karen Kelley RN  Outcome: Ongoing     Problem: Urinary Retention:  Goal: Experiences of bladder distention will decrease  Description  Experiences of bladder distention will decrease  6/6/2019 2249 by Luis Felipe Austin RN  Outcome: Ongoing  6/6/2019 1814 by Elvia Ramires RN  Outcome: Ongoing  6/6/2019 1305 by Karen Kelley RN  Outcome: Ongoing  Goal: Urinary elimination within specified parameters  Description  Urinary elimination within specified parameters  6/6/2019 2249 by Luis Felipe Austin RN  Outcome: Ongoing  6/6/2019 1814 by Elvia Ramires RN  Outcome: Ongoing  6/6/2019 1305 by Karen Kelley RN  Outcome: Ongoing     Problem: Pain:  Goal: Pain level will decrease  Description  Pain level will decrease  6/6/2019 2249 by Luis Felipe Austin RN  Outcome: Ongoing  6/6/2019 1814 by Elvia Ramires RN  Outcome: Ongoing  6/6/2019 1305 by Karen Kelley RN  Outcome: Ongoing  Goal: Control of acute pain  Description  Control of acute pain  6/6/2019 2249 by Luis Felipe Austin RN  Outcome: Ongoing  6/6/2019 1305 by Karen Kelley RN  Outcome: Ongoing  Goal: Control of chronic pain  Description  Control of chronic pain  6/6/2019 2249 by Luis Felipe Austin RN  Outcome: Ongoing  6/6/2019 1305 by Karen Kelley RN  Outcome: Ongoing     Problem: Discharge Planning:  Goal: Discharged to appropriate level of care  Description  Discharged to appropriate level of care  6/6/2019 2249 by Luis Felipe Austin RN  Outcome: Ongoing  6/6/2019 1814 by Tamy Nielsen RN  Outcome: Ongoing  6/6/2019 1305 by Cherylene Potters, RN  Outcome: Ongoing     Problem: Mood - Altered:  Goal: Mood stable  Description  Mood stable  6/6/2019 2249 by Charlotta Cheadle, RN  Outcome: Ongoing  6/6/2019 1814 by Tamy Nielsen RN  Outcome: Ongoing  6/6/2019 1305 by Cherylene Potters, RN  Outcome: Ongoing     Problem: Nausea/Vomiting:  Goal: Absence of nausea/vomiting  Description  Absence of nausea/vomiting  6/6/2019 2249 by Charlotta Cheadle, RN  Outcome: Ongoing  6/6/2019 1814 by Tamy Nielsen RN  Outcome: Ongoing  6/6/2019 1305 by Cherylene Potters, RN  Outcome: Ongoing     Problem: Venous Thromboembolism:  Goal: Will show no signs or symptoms of venous thromboembolism  Description  Will show no signs or symptoms of venous thromboembolism  6/6/2019 2249 by Charlotta Cheadle, RN  Outcome: Ongoing  6/6/2019 1814 by Tamy Nielsen RN  Outcome: Ongoing  6/6/2019 1305 by Cherylene Potters, RN  Outcome: Ongoing  Goal: Absence of signs or symptoms of impaired coagulation  Description  Absence of signs or symptoms of impaired coagulation  6/6/2019 2249 by Charlotta Cheadle, RN  Outcome: Ongoing  6/6/2019 1305 by Cherylene Potters, RN  Outcome: Ongoing     Problem: Falls - Risk of:  Goal: Will remain free from falls  Description  Will remain free from falls  6/6/2019 2249 by Charlotta Cheadle, RN  Outcome: Ongoing  6/6/2019 1814 by Tamy Nielsen RN  Outcome: Ongoing  6/6/2019 1305 by Cherylene Potters, RN  Outcome: Ongoing  Goal: Absence of physical injury  Description  Absence of physical injury  6/6/2019 2249 by Charlotta Cheadle, RN  Outcome: Ongoing  6/6/2019 1814 by Tamy Nielsen RN  Outcome: Ongoing  6/6/2019 1305 by Cherylene Potters, RN  Outcome: Ongoing

## 2019-06-21 ENCOUNTER — POSTPARTUM VISIT (OUTPATIENT)
Dept: OBGYN CLINIC | Age: 39
End: 2019-06-21

## 2019-06-21 VITALS
WEIGHT: 170.4 LBS | BODY MASS INDEX: 31.17 KG/M2 | HEART RATE: 79 BPM | SYSTOLIC BLOOD PRESSURE: 148 MMHG | DIASTOLIC BLOOD PRESSURE: 106 MMHG | TEMPERATURE: 98.4 F

## 2019-06-21 DIAGNOSIS — I10 ELEVATED BLOOD PRESSURE READING WITH DIAGNOSIS OF HYPERTENSION: ICD-10-CM

## 2019-06-21 DIAGNOSIS — Z98.891 S/P PRIMARY LOW TRANSVERSE C-SECTION: ICD-10-CM

## 2019-06-21 DIAGNOSIS — Z09 POSTOP CHECK: Primary | ICD-10-CM

## 2019-06-21 DIAGNOSIS — I10 HYPERTENSION, UNSPECIFIED TYPE: ICD-10-CM

## 2019-06-21 PROCEDURE — 99024 POSTOP FOLLOW-UP VISIT: CPT | Performed by: OBSTETRICS & GYNECOLOGY

## 2019-06-21 RX ORDER — NIFEDIPINE 60 MG/1
60 TABLET, EXTENDED RELEASE ORAL DAILY
Qty: 30 TABLET | Refills: 5 | Status: SHIPPED | OUTPATIENT
Start: 2019-06-21 | End: 2020-02-04 | Stop reason: SDUPTHER

## 2019-06-21 NOTE — PROGRESS NOTES
Maternal emotional well being screening form completed and reviewed with patient. Current score is 2. Patient given referral to [address] [place] [address] ([phone]):  No\

## 2019-06-23 PROBLEM — Z34.03 ENCOUNTER FOR PRENATAL CARE IN THIRD TRIMESTER OF FIRST PREGNANCY: Status: RESOLVED | Noted: 2018-12-17 | Resolved: 2019-06-23

## 2019-06-23 PROBLEM — O99.013 ANEMIA DURING PREGNANCY IN THIRD TRIMESTER: Status: RESOLVED | Noted: 2019-02-14 | Resolved: 2019-06-23

## 2019-06-23 PROBLEM — O10.919 PRE-EXISTING HYPERTENSION DURING PREGNANCY, ANTEPARTUM: Status: RESOLVED | Noted: 2018-12-17 | Resolved: 2019-06-23

## 2019-06-23 PROBLEM — Z34.90 ENCOUNTER FOR INDUCTION OF LABOR: Status: RESOLVED | Noted: 2019-06-03 | Resolved: 2019-06-23

## 2019-06-23 PROBLEM — O09.513 ELDERLY PRIMIGRAVIDA IN THIRD TRIMESTER: Status: RESOLVED | Noted: 2018-12-17 | Resolved: 2019-06-23

## 2019-06-23 ASSESSMENT — ENCOUNTER SYMPTOMS
SHORTNESS OF BREATH: 0
NAUSEA: 0
DIARRHEA: 0
GASTROINTESTINAL NEGATIVE: 1
VOMITING: 0
CONSTIPATION: 0
ABDOMINAL PAIN: 0
RESPIRATORY NEGATIVE: 1

## 2019-06-23 NOTE — PROGRESS NOTES
Subjective:      Patient ID: Delia You is a 35 y.o. female. HPI  36 y/o  female presents for postop/postpartum visit. Patient is 2 weeks s/p uncomplicated PLTCS for arrest of descent at 38w0d.   Pregnancy was complicated by gestational hypertension, chronic hypertension, advanced maternal age, anemia and asthma.  Patient has noted some increased blood pressures in the past week: 150's-160's/90's-100's. Taking aldomet 500 mg BID. Denies headache, vision changes and RUQ pain.   Denies fever, chills, chest pain, shortness of breath, nausea, vomiting, diarrhea and constipation.    Patient is breast feeding. Denies signs or symptoms of depression. Review of Systems   Constitutional: Negative. Negative for activity change, appetite change, chills, fatigue, fever and unexpected weight change. Eyes: Negative for visual disturbance. Respiratory: Negative. Negative for shortness of breath. Cardiovascular: Negative. Negative for chest pain. Gastrointestinal: Negative. Negative for abdominal pain, constipation, diarrhea, nausea and vomiting. Endocrine: Negative for cold intolerance and heat intolerance. Genitourinary: Negative for difficulty urinating, dysuria, hematuria, pelvic pain, urgency, vaginal bleeding, vaginal discharge and vaginal pain. Skin: Negative. Neurological: Negative for headaches. Hematological: Does not bruise/bleed easily. Psychiatric/Behavioral: Negative. Negative for dysphoric mood and suicidal ideas. The patient is not nervous/anxious. Objective:   Physical Exam   Constitutional: She is oriented to person, place, and time. She appears well-developed and well-nourished. No distress. HENT:   Head: Normocephalic and atraumatic. Pulmonary/Chest: Effort normal.   Abdominal: Soft. She exhibits no distension and no mass. There is no tenderness. There is no guarding. Incision clean dry intact, no apparent signs of infection or compromise. Neurological: She is alert and oriented to person, place, and time. Skin: Skin is warm and dry. She is not diaphoretic. Psychiatric: She has a normal mood and affect. Her behavior is normal. Judgment and thought content normal.   Nursing note and vitals reviewed. Assessment:       Diagnosis Orders   1. Postop check     2. S/P primary low transverse      3. Hypertension, unspecified type     4. Elevated blood pressure reading with diagnosis of hypertension             Plan:      Rx procardia--to start immediately. Pharmacy contacted--medication is available for . Continue BP monitoring at home--parameters discussed. Pre-eclampsia precautions. Follow up in 2-3 days for blood pressure check.           Conception Anchors DO Yonathan

## 2019-06-24 ENCOUNTER — POSTPARTUM VISIT (OUTPATIENT)
Dept: OBGYN CLINIC | Age: 39
End: 2019-06-24

## 2019-06-24 VITALS
DIASTOLIC BLOOD PRESSURE: 68 MMHG | HEART RATE: 98 BPM | WEIGHT: 168.6 LBS | BODY MASS INDEX: 30.84 KG/M2 | TEMPERATURE: 98 F | SYSTOLIC BLOOD PRESSURE: 124 MMHG

## 2019-06-24 DIAGNOSIS — Z98.891 S/P PRIMARY LOW TRANSVERSE C-SECTION: ICD-10-CM

## 2019-06-24 DIAGNOSIS — I10 HYPERTENSION, UNSPECIFIED TYPE: ICD-10-CM

## 2019-06-24 DIAGNOSIS — Z09 POSTOP CHECK: Primary | ICD-10-CM

## 2019-06-24 PROCEDURE — 0503F POSTPARTUM CARE VISIT: CPT | Performed by: OBSTETRICS & GYNECOLOGY

## 2019-06-24 ASSESSMENT — ENCOUNTER SYMPTOMS: SHORTNESS OF BREATH: 0

## 2019-06-24 NOTE — PROGRESS NOTES
Subjective:      Patient ID: Leandro Halsted is a 35 y.o. female. HPI  36 y/o  female presents for blood pressure check and postop/postpartum visit. Patient is 2 weeks s/p uncomplicated PLTCS for arrest of descent at 38w0d.   Pregnancy was complicated by gestational hypertension, chronic hypertension, advanced maternal age, anemia and asthma.  Patient was seen on 19 for postop visit--patient noted some increased blood pressures the week prior to her visit: 150's-160's/90's-100's. Taking aldomet 500 mg BID. Started on procardia on 19. Has noted decrease in blood pressures since starting procardia:  130's/80's. Presents for follow up. Denies headache, vision changes and RUQ pain.   Denies fever, chills, chest pain, shortness of breath, nausea, vomiting, diarrhea and constipation.    Patient is breast feeding. Denies signs or symptoms of depression. Review of Systems   Constitutional: Negative. Negative for activity change, appetite change, chills, fatigue, fever and unexpected weight change. Eyes: Negative for visual disturbance. Respiratory: Negative for shortness of breath. Cardiovascular: Negative for chest pain. Neurological: Negative for headaches. Psychiatric/Behavioral: Negative. Objective:   Physical Exam   Constitutional: She is oriented to person, place, and time. She appears well-developed and well-nourished. No distress. Neurological: She is alert and oriented to person, place, and time. Skin: Skin is warm and dry. She is not diaphoretic. Psychiatric: She has a normal mood and affect. Her behavior is normal. Judgment and thought content normal.   Nursing note and vitals reviewed. Assessment:       Diagnosis Orders   1. Postop check     2. S/P primary low transverse      3. Hypertension, unspecified type             Plan:      Continue procardia  Continue blood pressure monitoring.   Pre-eclampsia precautions  Follow up in 4 weeks for

## 2019-06-24 NOTE — PROGRESS NOTES
Maternal emotional well being screening form completed and reviewed with patient. Current score is 2. Patient given referral to [address] [place] [address] ([phone]):  No

## 2019-07-02 ENCOUNTER — OFFICE VISIT (OUTPATIENT)
Dept: INTERNAL MEDICINE CLINIC | Age: 39
End: 2019-07-02
Payer: COMMERCIAL

## 2019-07-02 VITALS
BODY MASS INDEX: 31.36 KG/M2 | SYSTOLIC BLOOD PRESSURE: 120 MMHG | HEART RATE: 72 BPM | HEIGHT: 62 IN | DIASTOLIC BLOOD PRESSURE: 80 MMHG | WEIGHT: 170.4 LBS | TEMPERATURE: 98.5 F

## 2019-07-02 DIAGNOSIS — M25.532 WRIST PAIN, ACUTE, LEFT: Primary | ICD-10-CM

## 2019-07-02 PROCEDURE — 99213 OFFICE O/P EST LOW 20 MIN: CPT | Performed by: NURSE PRACTITIONER

## 2019-07-02 ASSESSMENT — PATIENT HEALTH QUESTIONNAIRE - PHQ9
1. LITTLE INTEREST OR PLEASURE IN DOING THINGS: 0
SUM OF ALL RESPONSES TO PHQ QUESTIONS 1-9: 0
SUM OF ALL RESPONSES TO PHQ9 QUESTIONS 1 & 2: 0
2. FEELING DOWN, DEPRESSED OR HOPELESS: 0
SUM OF ALL RESPONSES TO PHQ QUESTIONS 1-9: 0

## 2019-07-02 ASSESSMENT — ENCOUNTER SYMPTOMS: RESPIRATORY NEGATIVE: 1

## 2019-07-02 NOTE — PATIENT INSTRUCTIONS
cause carpal tunnel syndrome. You may be able to limit an activity or do it differently to reduce your symptoms. You also can take other steps to feel better. If your symptoms are mild, 1 to 2 weeks of home treatment are likely to ease your pain. Surgery is needed only if other treatments do not work. Follow-up care is a key part of your treatment and safety. Be sure to make and go to all appointments, and call your doctor if you are having problems. It's also a good idea to know your test results and keep a list of the medicines you take. How can you care for yourself at home? · If possible, stop or reduce the activity that causes your symptoms. If you cannot stop the activity, take frequent breaks to rest and stretch or change hand positions to do a task. Try switching hands, such as when using a computer mouse. · Try to avoid bending or twisting your wrists. · Ask your doctor if you can take an over-the-counter pain medicine, such as acetaminophen (Tylenol), ibuprofen (Advil, Motrin), or naproxen (Aleve). Be safe with medicines. Read and follow all instructions on the label. · If your doctor prescribes corticosteroid medicine to help reduce pain and swelling, take it exactly as prescribed. Call your doctor if you think you are having a problem with your medicine. · Put ice or a cold pack on your wrist for 10 to 20 minutes at a time to ease pain. Put a thin cloth between the ice and your skin. · If your doctor or your physical or occupational therapist tells you to wear a wrist splint, wear it as directed to keep your wrist in a neutral position. This also eases pressure on your median nerve. · Ask your doctor whether you should have physical or occupational therapy to learn how to do tasks differently. · Try a yoga class to stretch your muscles and build strength in your hands and wrists. Yoga has been shown to ease carpal tunnel symptoms.   To prevent carpal tunnel  · When working at a computer, keep labels  · Read labels on cans and food packages. The labels tell you how much sodium is in each serving. Make sure that you look at the serving size. If you eat more than the serving size, you have eaten more sodium. · Food labels also tell you the Percent Daily Value for sodium. Choose products with low Percent Daily Values for sodium. · Be aware that sodium can come in forms other than salt, including monosodium glutamate (MSG), sodium citrate, and sodium bicarbonate (baking soda). MSG is often added to Asian food. When you eat out, you can sometimes ask for food without MSG or added salt. Buy low-sodium foods  · Buy foods that are labeled \"unsalted\" (no salt added), \"sodium-free\" (less than 5 mg of sodium per serving), or \"low-sodium\" (less than 140 mg of sodium per serving). Foods labeled \"reduced-sodium\" and \"light sodium\" may still have too much sodium. Be sure to read the label to see how much sodium you are getting. · Buy fresh vegetables, or frozen vegetables without added sauces. Buy low-sodium versions of canned vegetables, soups, and other canned goods. Prepare low-sodium meals  · Cut back on the amount of salt you use in cooking. This will help you adjust to the taste. Do not add salt after cooking. One teaspoon of salt has about 2,300 mg of sodium. · Take the salt shaker off the table. · Flavor your food with garlic, lemon juice, onion, vinegar, herbs, and spices. Do not use soy sauce, lite soy sauce, steak sauce, onion salt, garlic salt, celery salt, mustard, or ketchup on your food. · Use low-sodium salad dressings, sauces, and ketchup. Or make your own salad dressings and sauces without adding salt. · Use less salt (or none) when recipes call for it. You can often use half the salt a recipe calls for without losing flavor. Other foods such as rice, pasta, and grains do not need added salt. · Rinse canned vegetables, and cook them in fresh water.  This removes some--but not all--of the

## 2019-07-09 ENCOUNTER — OFFICE VISIT (OUTPATIENT)
Dept: ORTHOPEDIC SURGERY | Age: 39
End: 2019-07-09
Payer: COMMERCIAL

## 2019-07-09 VITALS — BODY MASS INDEX: 31.28 KG/M2 | WEIGHT: 170 LBS | RESPIRATION RATE: 16 BRPM | HEIGHT: 62 IN

## 2019-07-09 DIAGNOSIS — M65.4 RADIAL STYLOID TENOSYNOVITIS OF LEFT HAND: Primary | ICD-10-CM

## 2019-07-09 PROCEDURE — 99243 OFF/OP CNSLTJ NEW/EST LOW 30: CPT | Performed by: ORTHOPAEDIC SURGERY

## 2019-07-09 PROCEDURE — 20550 NJX 1 TENDON SHEATH/LIGAMENT: CPT | Performed by: ORTHOPAEDIC SURGERY

## 2019-07-09 NOTE — PROGRESS NOTES
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  Symptoms have been present for 1 month, and came on post partum  Pain starts at the radial aspect of the wrist and may radiate proximally, towards the elbow or distally, towards the thumb. There is no pain at rest.  Pain is aggravated by wrist and thumb movement and gripping. Occasionally, a popping sensation is noted on thumb movement. The wrist swells at times. Treatment has not been prescribed. The pain has not changed recently. There is no history of significant injury. The pain assessment has been reviewed and is correct as stated. The patient's social history, past medical history, family history, medications, allergies and review of systems, entered 7/9/19, have been reviewed, and dated and are recorded in the chart. On examination the patient is Height: 5' 2\" (157.5 cm) tall and weighs Weight: 170 lb (77.1 kg). Respirations are 18 per minute. The patient is well nourished, is oriented to time and place, demonstrates appropriate mood and affect as well as normal gait and station. There is soft tissue swelling over the radial styloid of the wrist.  There is tenderness over the 1st dorsal compartment. There is no tenderness over the base of the thumb metacarpal.  The grind test is negative for C[facility] arthritis. The Magnus test is positive. Range of motion of the fingers, thumbs and wrists is full bilaterally, with flexion and ulnar deviation being painful on the left. Skin is intact, as is distal circulation and sensation. Gross muscle strength is normal bilaterally. Hand and wrist joints are stable. There are no subcutaneous nodules or enlarged epitrochlear lymph nodes. Impression:  Radial styloid tendonitis of the wrist, left. The nature of this medical problem is fully discussed with the patient, including all treatment options.  All

## 2019-07-22 ENCOUNTER — TELEPHONE (OUTPATIENT)
Dept: OBGYN CLINIC | Age: 39
End: 2019-07-22

## 2019-07-22 ENCOUNTER — POSTPARTUM VISIT (OUTPATIENT)
Dept: OBGYN CLINIC | Age: 39
End: 2019-07-22

## 2019-07-22 VITALS
DIASTOLIC BLOOD PRESSURE: 70 MMHG | WEIGHT: 170.6 LBS | BODY MASS INDEX: 31.2 KG/M2 | TEMPERATURE: 98.3 F | SYSTOLIC BLOOD PRESSURE: 118 MMHG | HEART RATE: 74 BPM

## 2019-07-22 DIAGNOSIS — Z98.891 S/P PRIMARY LOW TRANSVERSE C-SECTION: ICD-10-CM

## 2019-07-22 DIAGNOSIS — I10 HYPERTENSION, UNSPECIFIED TYPE: ICD-10-CM

## 2019-07-22 DIAGNOSIS — J45.20 MILD INTERMITTENT ASTHMA WITHOUT COMPLICATION: ICD-10-CM

## 2019-07-22 PROCEDURE — 0503F POSTPARTUM CARE VISIT: CPT | Performed by: OBSTETRICS & GYNECOLOGY

## 2019-07-23 ENCOUNTER — TELEPHONE (OUTPATIENT)
Dept: OBGYN CLINIC | Age: 39
End: 2019-07-23

## 2019-07-24 ENCOUNTER — TELEPHONE (OUTPATIENT)
Dept: OBGYN CLINIC | Age: 39
End: 2019-07-24

## 2019-07-24 RX ORDER — ACETAMINOPHEN AND CODEINE PHOSPHATE 120; 12 MG/5ML; MG/5ML
1 SOLUTION ORAL DAILY
Qty: 30 TABLET | Refills: 5 | Status: SHIPPED | OUTPATIENT
Start: 2019-07-24 | End: 2019-09-04 | Stop reason: ALTCHOICE

## 2019-07-24 NOTE — TELEPHONE ENCOUNTER
Pt calling to check status of medication. She states she missed a call from this office number? Please advise.

## 2019-07-24 NOTE — TELEPHONE ENCOUNTER
Started Nexplanon process    Nexplanon benefits summary    Copay- 0  Covered at 100%  [facility]    Spoke to [place]  no authorization required   Ref number [id number]    [Pt will pay office visit copay when she comes in to get IUD Inserted]      We Need a copy of Contraception Card to send order

## 2019-07-25 ASSESSMENT — ENCOUNTER SYMPTOMS
VOMITING: 0
RESPIRATORY NEGATIVE: 1
GASTROINTESTINAL NEGATIVE: 1
NAUSEA: 0
CONSTIPATION: 0
SHORTNESS OF BREATH: 0
ABDOMINAL PAIN: 0
DIARRHEA: 0

## 2019-08-07 ENCOUNTER — TELEPHONE (OUTPATIENT)
Dept: OBGYN CLINIC | Age: 39
End: 2019-08-07

## 2019-08-07 NOTE — TELEPHONE ENCOUNTER
Received call from [facility] ( regarding Nexplanon) They did not receive a copy of pt signature page. Also on page 3 ( bottom of page) First and last name of physician will need to be printed. Please re-fax forms to [phone].

## 2019-08-22 NOTE — TELEPHONE ENCOUNTER
Spoke to patient and aware Nexplanon received in office. Pt scheduled 9/4/19 @ 10 am for insertion. Advised to come with full bladder for negative urine pregnancy.      DONE

## 2019-09-04 ENCOUNTER — OFFICE VISIT (OUTPATIENT)
Dept: OBGYN CLINIC | Age: 39
End: 2019-09-04
Payer: COMMERCIAL

## 2019-09-04 VITALS
WEIGHT: 176.8 LBS | TEMPERATURE: 98.1 F | HEART RATE: 79 BPM | DIASTOLIC BLOOD PRESSURE: 74 MMHG | BODY MASS INDEX: 32.34 KG/M2 | SYSTOLIC BLOOD PRESSURE: 138 MMHG

## 2019-09-04 DIAGNOSIS — Z30.017 FAMILY PLANNING, SUBDERMAL CONTRACEPTIVE INSERTION: Primary | ICD-10-CM

## 2019-09-04 LAB
CONTROL: NORMAL
PREGNANCY TEST URINE, POC: NEGATIVE

## 2019-09-04 PROCEDURE — 81025 URINE PREGNANCY TEST: CPT | Performed by: OBSTETRICS & GYNECOLOGY

## 2019-09-04 PROCEDURE — 11981 INSERTION DRUG DLVR IMPLANT: CPT | Performed by: OBSTETRICS & GYNECOLOGY

## 2019-09-06 ENCOUNTER — OFFICE VISIT (OUTPATIENT)
Dept: INTERNAL MEDICINE CLINIC | Age: 39
End: 2019-09-06
Payer: COMMERCIAL

## 2019-09-06 VITALS
DIASTOLIC BLOOD PRESSURE: 90 MMHG | BODY MASS INDEX: 32.2 KG/M2 | OXYGEN SATURATION: 71 % | SYSTOLIC BLOOD PRESSURE: 144 MMHG | HEART RATE: 78 BPM | WEIGHT: 175 LBS | TEMPERATURE: 98.4 F | HEIGHT: 62 IN

## 2019-09-06 DIAGNOSIS — I10 HYPERTENSION, UNCONTROLLED: Primary | ICD-10-CM

## 2019-09-06 DIAGNOSIS — J30.89 OTHER ALLERGIC RHINITIS: ICD-10-CM

## 2019-09-06 DIAGNOSIS — R79.89 ABNORMAL TSH: ICD-10-CM

## 2019-09-06 DIAGNOSIS — E66.9 OBESITY (BMI 30-39.9): ICD-10-CM

## 2019-09-06 DIAGNOSIS — E55.9 VITAMIN D DEFICIENCY: ICD-10-CM

## 2019-09-06 DIAGNOSIS — E78.5 DYSLIPIDEMIA: ICD-10-CM

## 2019-09-06 DIAGNOSIS — J45.20 MILD INTERMITTENT ASTHMA WITHOUT COMPLICATION: ICD-10-CM

## 2019-09-06 LAB
A/G RATIO: 1.8 (ref 1.1–2.2)
ALBUMIN SERPL-MCNC: 4.8 G/DL (ref 3.4–5)
ALP BLD-CCNC: 66 U/L (ref 40–129)
ALT SERPL-CCNC: 15 U/L (ref 10–40)
ANION GAP SERPL CALCULATED.3IONS-SCNC: 13 MMOL/L (ref 3–16)
AST SERPL-CCNC: 15 U/L (ref 15–37)
BASOPHILS ABSOLUTE: 0 K/UL (ref 0–0.2)
BASOPHILS RELATIVE PERCENT: 0.6 %
BILIRUB SERPL-MCNC: 0.3 MG/DL (ref 0–1)
BUN BLDV-MCNC: 11 MG/DL (ref 7–20)
CALCIUM SERPL-MCNC: 10 MG/DL (ref 8.3–10.6)
CHLORIDE BLD-SCNC: 108 MMOL/L (ref 99–110)
CHOLESTEROL, TOTAL: 189 MG/DL (ref 0–199)
CO2: 21 MMOL/L (ref 21–32)
CREAT SERPL-MCNC: 0.8 MG/DL (ref 0.6–1.1)
CREATININE URINE: 145.2 MG/DL (ref 28–259)
EOSINOPHILS ABSOLUTE: 0.1 K/UL (ref 0–0.6)
EOSINOPHILS RELATIVE PERCENT: 1.7 %
GFR AFRICAN AMERICAN: >60
GFR NON-AFRICAN AMERICAN: >60
GLOBULIN: 2.6 G/DL
GLUCOSE BLD-MCNC: 82 MG/DL (ref 70–99)
HCT VFR BLD CALC: 38 % (ref 36–48)
HDLC SERPL-MCNC: 52 MG/DL (ref 40–60)
HEMOGLOBIN: 12.6 G/DL (ref 12–16)
LDL CHOLESTEROL CALCULATED: 123 MG/DL
LYMPHOCYTES ABSOLUTE: 2.2 K/UL (ref 1–5.1)
LYMPHOCYTES RELATIVE PERCENT: 32.6 %
MCH RBC QN AUTO: 31.7 PG (ref 26–34)
MCHC RBC AUTO-ENTMCNC: 33.2 G/DL (ref 31–36)
MCV RBC AUTO: 95.5 FL (ref 80–100)
MICROALBUMIN UR-MCNC: <1.2 MG/DL
MICROALBUMIN/CREAT UR-RTO: NORMAL MG/G (ref 0–30)
MONOCYTES ABSOLUTE: 0.5 K/UL (ref 0–1.3)
MONOCYTES RELATIVE PERCENT: 6.8 %
NEUTROPHILS ABSOLUTE: 3.9 K/UL (ref 1.7–7.7)
NEUTROPHILS RELATIVE PERCENT: 58.3 %
PDW BLD-RTO: 13.5 % (ref 12.4–15.4)
PLATELET # BLD: 397 K/UL (ref 135–450)
PMV BLD AUTO: 7.1 FL (ref 5–10.5)
POTASSIUM SERPL-SCNC: 3.9 MMOL/L (ref 3.5–5.1)
RBC # BLD: 3.98 M/UL (ref 4–5.2)
SODIUM BLD-SCNC: 142 MMOL/L (ref 136–145)
TOTAL PROTEIN: 7.4 G/DL (ref 6.4–8.2)
TRIGL SERPL-MCNC: 68 MG/DL (ref 0–150)
TSH REFLEX: 0.55 UIU/ML (ref 0.27–4.2)
VITAMIN D 25-HYDROXY: 24 NG/ML
VLDLC SERPL CALC-MCNC: 14 MG/DL
WBC # BLD: 6.7 K/UL (ref 4–11)

## 2019-09-06 PROCEDURE — 99214 OFFICE O/P EST MOD 30 MIN: CPT | Performed by: INTERNAL MEDICINE

## 2019-09-06 RX ORDER — TELMISARTAN 20 MG/1
20 TABLET ORAL DAILY
Qty: 30 TABLET | Refills: 3 | Status: SHIPPED | OUTPATIENT
Start: 2019-09-06 | End: 2020-02-04 | Stop reason: SDUPTHER

## 2019-09-06 RX ORDER — LORATADINE 10 MG/1
10 TABLET ORAL DAILY PRN
Qty: 30 TABLET | Refills: 3 | Status: SHIPPED | OUTPATIENT
Start: 2019-09-06 | End: 2019-10-06

## 2019-09-06 ASSESSMENT — PATIENT HEALTH QUESTIONNAIRE - PHQ9
SUM OF ALL RESPONSES TO PHQ9 QUESTIONS 1 & 2: 0
SUM OF ALL RESPONSES TO PHQ QUESTIONS 1-9: 0
2. FEELING DOWN, DEPRESSED OR HOPELESS: 0
1. LITTLE INTEREST OR PLEASURE IN DOING THINGS: 0
SUM OF ALL RESPONSES TO PHQ QUESTIONS 1-9: 0

## 2019-09-06 NOTE — PROGRESS NOTES
SUBJECTIVE:  Caio Mederos is a 35 y.o. female [address] [place]   Chief Complaint   Patient presents with    Follow-up      PT HERE FOR EVAL    HTN - PRESENTLY ON NIFEDIPINE.  + DIET / EXERCISE COMPLIANCE. ELEVATED BP. OCC HEADACHE  , DIZZINESS No. STATES BP ALSO ELEVATED DURING RECENT PREGNANCY. PT POSTPARTUM SINCE 6/19  DYSLIPIDEMIA -  ? EXERCISE / DIET COMPLIANCE . LABS D/W PT  ABN TSH - DURING PREGNANCY. DENIES FATIGUE. NO COLD / NO HEAT INTOLERANCE  ASTHMA - DENIES WHEEZING, NO SOB, NO RECENT INHALER USE   VIT D DEF - NOT ON MED AT THIS TIME. PREVIOUS LAB D/W PT  ALLERGIC RHINITIS -  + NASAL CONGESTION, + POSTNASAL DRAINAGE , NO SINUS PRESSURE, OCC HA, ? SNEEZING, NO WATERY ITCHY EYES.  OBESITY - DIET / EXERCISE REVIEWED. WT NOTED      DENIES CP, No SOB, No PALPITATIONS, Yes COUGH - NON PRODUCTIVE  No ABD PAIN, No N/V, No DIARRHEA, No CONSTIPATION, No MELENA, No HEMATOCHEZIA. No DYSURIA, No FREQ, No URGENCY, No HEMATURIA    PMH: REVIEWED AND UPDATED TODAY    PSH: REVIEWED AND UPDATED TODAY    SOCIAL HX: REVIEWED AND UPDATED TODAY    FAMILY HX: REVIEWED AND UPDATED TODAY    ALLERGY:  Flector [diclofenac epolamine]    MEDS: REVIEWED  Prior to Visit Medications    Medication Sig Taking? Authorizing Provider   NIFEdipine (PROCARDIA XL) 60 MG extended release tablet Take 1 tablet by mouth daily Yes Monna Senate Federer, DO   ibuprofen (ADVIL;MOTRIN) 800 MG tablet Take 1 tablet by mouth every 8 hours Yes Monna Senate Federer, DO   Blood Pressure KIT 1 Device by Does not apply route 2 times daily Dx: E19 Yes Monna Senate Federer, DO   albuterol (PROVENTIL HFA;VENTOLIN HFA) 108 (90 BASE) MCG/ACT inhaler Inhale 2 puffs into the lungs 4 times daily as needed. Yes Arya Martinez MD   cetirizine (ZYRTEC) 10 MG tablet Take 1 tablet by mouth daily as needed for Allergies.  Yes Arya Martinez MD       ROS: COMPREHENSIVE ROS AS IN HX, REST -VE  History obtained from chart review and the patient    OBJECTIVE:   NURSING NOTE AND

## 2019-10-02 ENCOUNTER — TELEPHONE (OUTPATIENT)
Dept: INTERNAL MEDICINE CLINIC | Age: 39
End: 2019-10-02

## 2019-12-06 RX ORDER — NIFEDIPINE 60 MG/1
TABLET, FILM COATED, EXTENDED RELEASE ORAL
Qty: 30 TABLET | Refills: 4 | Status: SHIPPED | OUTPATIENT
Start: 2019-12-06 | End: 2020-02-04 | Stop reason: SDUPTHER

## 2020-02-04 ENCOUNTER — OFFICE VISIT (OUTPATIENT)
Dept: INTERNAL MEDICINE CLINIC | Age: 40
End: 2020-02-04
Payer: COMMERCIAL

## 2020-02-04 VITALS
DIASTOLIC BLOOD PRESSURE: 80 MMHG | HEART RATE: 86 BPM | OXYGEN SATURATION: 98 % | SYSTOLIC BLOOD PRESSURE: 122 MMHG | WEIGHT: 174 LBS | HEIGHT: 62 IN | TEMPERATURE: 98.2 F | BODY MASS INDEX: 32.02 KG/M2

## 2020-02-04 PROCEDURE — 99214 OFFICE O/P EST MOD 30 MIN: CPT | Performed by: INTERNAL MEDICINE

## 2020-02-04 RX ORDER — TELMISARTAN 20 MG/1
20 TABLET ORAL DAILY
Qty: 90 TABLET | Refills: 0 | Status: SHIPPED | OUTPATIENT
Start: 2020-02-04 | End: 2020-09-04 | Stop reason: SDUPTHER

## 2020-02-04 RX ORDER — NIFEDIPINE 60 MG/1
TABLET, FILM COATED, EXTENDED RELEASE ORAL
Qty: 90 TABLET | Refills: 0 | Status: SHIPPED | OUTPATIENT
Start: 2020-02-04 | End: 2020-05-28 | Stop reason: SDUPTHER

## 2020-02-04 RX ORDER — FAMOTIDINE 20 MG
1 TABLET ORAL DAILY
Qty: 90 CAPSULE | Refills: 0 | Status: SHIPPED | OUTPATIENT
Start: 2020-02-04 | End: 2020-09-04 | Stop reason: SDUPTHER

## 2020-02-04 ASSESSMENT — PATIENT HEALTH QUESTIONNAIRE - PHQ9
1. LITTLE INTEREST OR PLEASURE IN DOING THINGS: 0
2. FEELING DOWN, DEPRESSED OR HOPELESS: 0
SUM OF ALL RESPONSES TO PHQ9 QUESTIONS 1 & 2: 0
SUM OF ALL RESPONSES TO PHQ QUESTIONS 1-9: 0
SUM OF ALL RESPONSES TO PHQ QUESTIONS 1-9: 0

## 2020-02-04 NOTE — PROGRESS NOTES
SUBJECTIVE:  Gentry Pradhan is a 35 y.o. female [address] [place]   Chief Complaint   Patient presents with    Hypertension        PT HERE FOR EVAL    HTN - TAKING MEDS - TOLERATING.  + DIET / EXERCISE COMPLIANCE. NO HEADACHE  , DIZZINESS No.   DYSLIPIDEMIA -  ? EXERCISE / DIET COMPLIANCE . LABS D/W PT  ASTHMA - DENIES WHEEZING, NO SOB, NO RECENT INHALER USE   VIT D DEF - PERSISTENT. NOT ON MED AT THIS TIME. LAB D/W PT  ALLERGIC RHINITIS -  + NASAL CONGESTION, + POSTNASAL DRAINAGE , NO SINUS PRESSURE, NO HA, ? SNEEZING, NO WATERY ITCHY EYES.  C/O FLATULENCE - ? DURATION. OTC GAS X HELPING  ABN TSH - RESOLVED      DENIES CP, No SOB, No PALPITATIONS, No COUGH  No ABD PAIN, No N/V, No DIARRHEA, No CONSTIPATION, No MELENA, No HEMATOCHEZIA. No DYSURIA, No FREQ, No URGENCY, NoHEMATURIA    PMH: REVIEWED AND UPDATED TODAY    PSH: REVIEWED AND UPDATED TODAY    SOCIAL HX: REVIEWED AND UPDATED TODAY    FAMILY HX: REVIEWED AND UPDATED TODAY    ALLERGY:  Flector [diclofenac epolamine]    MEDS: REVIEWED  Prior to Visit Medications    Medication Sig Taking? Authorizing Provider   NIFEdipine (ADALAT CC) 60 MG extended release tablet TAKE 1 TABLET BY MOUTH ONE TIME A DAY  Sree Baugh MD   telmisartan (MICARDIS) 20 MG tablet Take 1 tablet by mouth daily  Sree Baugh MD   ibuprofen (ADVIL;MOTRIN) 800 MG tablet Take 1 tablet by mouth every 8 hours  Jason Mcmanus DO   Blood Pressure KIT 1 Device by Does not apply route 2 times daily Dx: Can Mcmanus DO   albuterol (PROVENTIL HFA;VENTOLIN HFA) 108 (90 BASE) MCG/ACT inhaler Inhale 2 puffs into the lungs 4 times daily as needed. Sree Baugh MD   cetirizine (ZYRTEC) 10 MG tablet Take 1 tablet by mouth daily as needed for Allergies.   Sree Baugh MD       ROS: COMPREHENSIVE ROS AS IN HX, REST -VE  History obtained from chart review and the patient    OBJECTIVE:   NURSING NOTE AND VITALS REVIEWED  /86 (Site: Left Upper Arm, Position: Sitting, Cuff appointment. Discussed use, benefit, and side effects of prescribed medications. Barriers to medication compliance addressed. All patient questions answered. Pt voiced understanding.            MEDICATION SIDE EFFECTS D/W PATIENT    PT STABLE AT TIME OF D/C.    RETURN TO CLINIC WITHIN 3 MONTHS / PRN

## 2020-05-28 RX ORDER — NIFEDIPINE 60 MG/1
TABLET, FILM COATED, EXTENDED RELEASE ORAL
Qty: 90 TABLET | Refills: 0 | Status: SHIPPED | OUTPATIENT
Start: 2020-05-28 | End: 2020-09-04 | Stop reason: SDUPTHER

## 2020-09-04 ENCOUNTER — HOSPITAL ENCOUNTER (OUTPATIENT)
Dept: GENERAL RADIOLOGY | Age: 40
Discharge: HOME | End: 2020-09-04
Payer: COMMERCIAL

## 2020-09-04 ENCOUNTER — VIRTUAL VISIT (OUTPATIENT)
Dept: INTERNAL MEDICINE CLINIC | Age: 40
End: 2020-09-04
Payer: COMMERCIAL

## 2020-09-04 PROCEDURE — 73030 X-RAY EXAM OF SHOULDER: CPT

## 2020-09-04 PROCEDURE — 99214 OFFICE O/P EST MOD 30 MIN: CPT | Performed by: INTERNAL MEDICINE

## 2020-09-04 RX ORDER — NIFEDIPINE 60 MG/1
TABLET, FILM COATED, EXTENDED RELEASE ORAL
Qty: 90 TABLET | Refills: 0 | Status: CANCELLED | OUTPATIENT
Start: 2020-09-04

## 2020-09-04 RX ORDER — NIFEDIPINE 60 MG/1
TABLET, FILM COATED, EXTENDED RELEASE ORAL
Qty: 90 TABLET | Refills: 0 | Status: SHIPPED | OUTPATIENT
Start: 2020-09-04 | End: 2020-12-29 | Stop reason: SDUPTHER

## 2020-09-04 RX ORDER — FAMOTIDINE 20 MG
1 TABLET ORAL DAILY
Qty: 90 CAPSULE | Refills: 0 | Status: SHIPPED | OUTPATIENT
Start: 2020-09-04 | End: 2021-04-12 | Stop reason: SDUPTHER

## 2020-09-04 RX ORDER — TELMISARTAN 20 MG/1
20 TABLET ORAL DAILY
Qty: 90 TABLET | Refills: 0 | Status: SHIPPED | OUTPATIENT
Start: 2020-09-04 | End: 2020-12-29 | Stop reason: SDUPTHER

## 2020-09-04 RX ORDER — IBUPROFEN 800 MG/1
800 TABLET ORAL EVERY 8 HOURS
Qty: 60 TABLET | Refills: 0 | Status: SHIPPED | OUTPATIENT
Start: 2020-09-04 | End: 2020-11-23 | Stop reason: SDUPTHER

## 2020-09-04 NOTE — PATIENT INSTRUCTIONS
TAKE MED AS ADVISED    DIET/ EXERCISE.     FOLLOW UP WITHIN 3 MONTHS / AS NEEDED    FOLLOW UP FOR FASTING LABS, X RAY

## 2020-09-04 NOTE — PROGRESS NOTES
Allergies. Yes Taty Benz MD       Social History     Tobacco Use    Smoking status: Never Smoker    Smokeless tobacco: Never Used   Substance Use Topics    Alcohol use: No     Alcohol/week: 2.5 standard drinks     Types: 3 Standard drinks or equivalent per week     Comment: occasion    Drug use: No        ROS: COMPREHENSIVE ROS AS IN HX, REST -VE  History obtained from chart review and the patient    PHYSICAL EXAMINATION:  [ INSTRUCTIONS:  \"[x]\" Indicates a positive item  \"[]\" Indicates a negative item  -- DELETE ALL ITEMS NOT EXAMINED]  Vital Signs: (As obtained by patient/caregiver or practitioner observation)    Blood pressure- 123/85 Heart rate- 84   Respiratory rate-    Temperature-  Pulse oximetry-     Constitutional: [x] Appears well-developed and well-nourished [x] No apparent distress      [] Abnormal-   Mental status  [x] Alert and awake  [x] Oriented to person/place/time [x]Able to follow commands      Eyes:  EOM    [x]  Normal  [] Abnormal-  Sclera  [x]  Normal  [] Abnormal -         Discharge [x]  None visible  [] Abnormal -    HENT:   [x] Normocephalic, atraumatic.   [] Abnormal   [x] Mouth/Throat: Mucous membranes are moist.     External Ears [x] Normal  [] Abnormal-     Neck: [x] No visualized mass     Pulmonary/Chest: [x] Respiratory effort normal.  [x] No visualized signs of difficulty breathing or respiratory distress        [] Abnormal-      Musculoskeletal:   [x] Normal gait with no signs of ataxia         [x] Normal range of motion of neck        [] Abnormal-       Neurological:        [x] No Facial Asymmetry (Cranial nerve 7 motor function) (limited exam to video visit)          [x] No gaze palsy        [] Abnormal-         Skin:        [x] No significant exanthematous lesions or discoloration noted on facial skin         [] Abnormal-            Psychiatric:       [x] Normal Affect [x] No Hallucinations        [] Abnormal-     Other pertinent observable physical exam findings- SHOULDER: + TENDERNESS LT, + PAIN WITH MVT LT > > RT, NO ROM        PREVIOUS LABS  REVIEWED AND D/W PT     ASSESSMENT/PLAN:     Diagnosis Orders   1. Acute pain of both shoulders  COUNSELLED. EXERCISES. ANALGESICS PRN. MONITOR. IBUPROFEN 800 MG Q8H/ PRN WITH FOOD. ADVISED WARM COMPRESS  ADVISED ON HOME EXERCISES  X RAY TO EVAL. MAKE CHANGES AS NEEDED. 2. Essential hypertension  COUNSELLED. CONTINUE MEDS. ADVISED  LOW NA+ / DASH DIET/ EXERCISE. MONITOR. GOAL </= 120/80  F/U LABS  MAKE CHANGES AS NEEDED. 3. Dyslipidemia  COUNSELLED. ADVISED LOW FAT / CHOL DIET/ EXERCISE.  MONITOR. F/U LABS  GOALS D/W PT.  MAKE CHANGES AS NEEDED. 4. Mild intermittent asthma without complication  COUNSELLED. STABLE. ALBUTEROL PRN. MONITOR. MONITOR FOR TRIGGERS- ENVIRONMENTAL MODIFICATION. MAKE CHANGES AS NEEDED. 5. Vitamin D deficiency  COUNSELLED. ADVISED MED COMPLIANCE - ADVISED VIT D 1000 U DAILY. MONITOR AND MAKE CHANGES AS NEEDED. 6. Other allergic rhinitis  COUNSELLED. STABLE. MED PRN. MONITOR  MAKE CHANGES AS NEEDED. 7. Flatulence  COUNSELLED. IMPROVED. SYMPTOMATIC RX. MONITOR  MAKE CHANGES AS NEEDED. MEDICATION SIDE EFFECTS D/W PATIENT     PT STABLE AT TIME OF D/C.     RETURN TO CLINIC WITHIN 3 MONTHS / PRN    FOLLOW UP FOR FASTING LABS, X RAY      Due to this being a TeleHealth encounter (During LUOYG-72 public health emergency), evaluation of the following organ systems was limited: Vitals/Constitutional/EENT/Resp/CV/GI//MS/Neuro/Skin/Heme-Lymph-Imm. Pursuant to the emergency declaration under the [address] [place] [address], [address] [place] [address] authority and the [employer] and [employer] Act, this Virtual Visit was conducted with patient's (and/or legal guardian's) consent, to reduce the patient's risk of exposure to COVID-19 and provide necessary medical care.   The patient (and/or legal guardian) has also been advised to contact this office for worsening conditions or problems, and seek emergency medical treatment and/or call 911 if deemed necessary. Patient identification was verified at the start of the visit: Yes      Services were provided through a video synchronous discussion virtually to substitute for in-person clinic visit. Patient and provider were located at their individual homes. --Afshan Lowry MD on 9/4/2020 at 11:33 AM    An electronic signature was used to authenticate this note.

## 2020-09-22 DIAGNOSIS — I10 ESSENTIAL HYPERTENSION: ICD-10-CM

## 2020-09-22 DIAGNOSIS — E55.9 VITAMIN D DEFICIENCY: ICD-10-CM

## 2020-09-22 DIAGNOSIS — E78.5 DYSLIPIDEMIA: ICD-10-CM

## 2020-09-22 LAB
A/G RATIO: 1.8 (ref 1.1–2.2)
ALBUMIN SERPL-MCNC: 4.5 G/DL (ref 3.4–5)
ALP BLD-CCNC: 66 U/L (ref 40–129)
ALT SERPL-CCNC: 9 U/L (ref 10–40)
ANION GAP SERPL CALCULATED.3IONS-SCNC: 12 MMOL/L (ref 3–16)
AST SERPL-CCNC: 13 U/L (ref 15–37)
BASOPHILS ABSOLUTE: 0 K/UL (ref 0–0.2)
BASOPHILS RELATIVE PERCENT: 0.5 %
BILIRUB SERPL-MCNC: <0.2 MG/DL (ref 0–1)
BUN BLDV-MCNC: 9 MG/DL (ref 7–20)
CALCIUM SERPL-MCNC: 9.7 MG/DL (ref 8.3–10.6)
CHLORIDE BLD-SCNC: 109 MMOL/L (ref 99–110)
CHOLESTEROL, TOTAL: 188 MG/DL (ref 0–199)
CO2: 21 MMOL/L (ref 21–32)
CREAT SERPL-MCNC: 0.7 MG/DL (ref 0.6–1.1)
CREATININE URINE: 61.5 MG/DL (ref 28–259)
EOSINOPHILS ABSOLUTE: 0.3 K/UL (ref 0–0.6)
EOSINOPHILS RELATIVE PERCENT: 3.3 %
GFR AFRICAN AMERICAN: >60
GFR NON-AFRICAN AMERICAN: >60
GLOBULIN: 2.5 G/DL
GLUCOSE BLD-MCNC: 100 MG/DL (ref 70–99)
HCT VFR BLD CALC: 40.4 % (ref 36–48)
HDLC SERPL-MCNC: 46 MG/DL (ref 40–60)
HEMOGLOBIN: 13.5 G/DL (ref 12–16)
LDL CHOLESTEROL CALCULATED: 128 MG/DL
LYMPHOCYTES ABSOLUTE: 3.2 K/UL (ref 1–5.1)
LYMPHOCYTES RELATIVE PERCENT: 42 %
MCH RBC QN AUTO: 31.8 PG (ref 26–34)
MCHC RBC AUTO-ENTMCNC: 33.3 G/DL (ref 31–36)
MCV RBC AUTO: 95.5 FL (ref 80–100)
MICROALBUMIN UR-MCNC: <1.2 MG/DL
MICROALBUMIN/CREAT UR-RTO: NORMAL MG/G (ref 0–30)
MONOCYTES ABSOLUTE: 0.6 K/UL (ref 0–1.3)
MONOCYTES RELATIVE PERCENT: 7.3 %
NEUTROPHILS ABSOLUTE: 3.5 K/UL (ref 1.7–7.7)
NEUTROPHILS RELATIVE PERCENT: 46.9 %
PDW BLD-RTO: 13.1 % (ref 12.4–15.4)
PLATELET # BLD: 429 K/UL (ref 135–450)
PMV BLD AUTO: 7.3 FL (ref 5–10.5)
POTASSIUM SERPL-SCNC: 4 MMOL/L (ref 3.5–5.1)
RBC # BLD: 4.23 M/UL (ref 4–5.2)
SODIUM BLD-SCNC: 142 MMOL/L (ref 136–145)
TOTAL PROTEIN: 7 G/DL (ref 6.4–8.2)
TRIGL SERPL-MCNC: 71 MG/DL (ref 0–150)
TSH REFLEX: 0.93 UIU/ML (ref 0.27–4.2)
VITAMIN D 25-HYDROXY: 24.6 NG/ML
VLDLC SERPL CALC-MCNC: 14 MG/DL
WBC # BLD: 7.5 K/UL (ref 4–11)

## 2020-10-08 ENCOUNTER — PATIENT MESSAGE (OUTPATIENT)
Dept: INTERNAL MEDICINE CLINIC | Age: 40
End: 2020-10-08

## 2020-10-16 NOTE — TELEPHONE ENCOUNTER
CALLED AND DISCUSSED LABS AND X RAY WITH PT      Narrative    Left shoulder         HISTORY: Acute shoulder pain              Impression         3 views demonstrate new abnormality. X RAY REPORT UNCLEAR  WILL NEED TO CONTACT RADIOLOGY TO REVIEW    PT STATES SHOULDER PAIN WAXES AND WANES.  IBUPROFEN HELPING - DOES NOT NEED REFILL AT THIS TIME    ADVISED EXERCISES  F/U APPT  PT VERBALIZED UNDERSTANDING

## 2020-11-23 RX ORDER — IBUPROFEN 800 MG/1
800 TABLET ORAL EVERY 8 HOURS
Qty: 60 TABLET | Refills: 0 | Status: SHIPPED | OUTPATIENT
Start: 2020-11-23 | End: 2021-07-21 | Stop reason: SDUPTHER

## 2020-11-25 ENCOUNTER — OFFICE VISIT (OUTPATIENT)
Dept: OBGYN CLINIC | Age: 40
End: 2020-11-25
Payer: COMMERCIAL

## 2020-11-25 VITALS
WEIGHT: 166.8 LBS | HEART RATE: 80 BPM | TEMPERATURE: 97.9 F | SYSTOLIC BLOOD PRESSURE: 124 MMHG | BODY MASS INDEX: 30.51 KG/M2 | DIASTOLIC BLOOD PRESSURE: 78 MMHG

## 2020-11-25 PROCEDURE — 99214 OFFICE O/P EST MOD 30 MIN: CPT | Performed by: OBSTETRICS & GYNECOLOGY

## 2020-11-29 ASSESSMENT — ENCOUNTER SYMPTOMS
DIARRHEA: 0
ABDOMINAL PAIN: 0
SHORTNESS OF BREATH: 0
NAUSEA: 0
VOMITING: 0
ABDOMINAL DISTENTION: 0
CONSTIPATION: 0

## 2020-11-30 NOTE — TELEPHONE ENCOUNTER
Patient called and stated she had nexplanon removed last week and is asking when she should get the nuvaring sent to her. Stated she has not heard of anything yet from the pharmacy.      Routing to Dr. Yamilex Ray

## 2020-11-30 NOTE — PROGRESS NOTES
Last PAP was normal; November/2018. Abnormal pap history?  No  Last HPV screen: 2018 Negative  Patient has never had a mammogram.  Last Dexa Scan: N/A   Contraceptive method: Nexplanon      No prior colonoscopy
Nexplanon Contraceptive Implant   Removal Note    The pt is a 38 y.o. Elisabet Smith who presented for removal of a subdermal contraceptive implant. She has been counseled regarding the risks, benefits and alternatives of the procedure. She has signed the consent form, and wishes to proceed. Current method of contraception: Nexplanon    Procedure Details  The inner side of the left arm was palpated and the nexplanon device was identified without difficulty. The area was cleaned with Betadine and infiltrated with 2mL of lidocaine immediately inferior to the distal end of the device. A 5mm incision was made at the previous insertion site and the device was brought to the level of the incision without difficulty. It was grasped with a hemostat and overlying capsule was reduced with the scalpel. Device was then removed without difficulty. The removal site was closed with steri-strips and coban dressing. The patient tolerated the procedure without complications.     Gabbi Burr MD
Assessment:       Diagnosis Orders   1. Abnormal vaginal bleeding     2. Breakthrough bleeding on Nexplanon     3. Nexplanon removal     4. Irregular menses     5. S/P primary low transverse      6. Essential hypertension             Plan:      Nexplanon removal today  Options for contraception reviewed. Risks and benefits discussed. Rx OCP trial.  Patient to monitor blood pressures for any elevations or issues with OCP use. Follow up prn and 3-6 months for well woman examination and follow up.            Micheline Mcmanus, DO

## 2020-12-01 RX ORDER — ETONOGESTREL AND ETHINYL ESTRADIOL 11.7; 2.7 MG/1; MG/1
1 INSERT, EXTENDED RELEASE VAGINAL
Qty: 3 EACH | Refills: 3 | Status: SHIPPED | OUTPATIENT
Start: 2020-12-01 | End: 2021-11-18

## 2020-12-01 NOTE — TELEPHONE ENCOUNTER
Please apologize to patient for delay. Please check with patient on preferred pharmacy. She has 3 listed including a mail in option. Thanks.

## 2020-12-01 NOTE — TELEPHONE ENCOUNTER
Placed call to patient, verified pharmacy and re routing back to Dr. Jayda Hirsch with correct pharmacy added for nuvaring     Routing to Dr. Yenifer Vicente

## 2020-12-08 ENCOUNTER — OFFICE VISIT (OUTPATIENT)
Dept: FAMILY MEDICINE CLINIC | Age: 40
End: 2020-12-08
Payer: COMMERCIAL

## 2020-12-08 ENCOUNTER — OFFICE VISIT (OUTPATIENT)
Dept: SURGERY | Age: 40
End: 2020-12-08
Payer: COMMERCIAL

## 2020-12-08 VITALS
OXYGEN SATURATION: 98 % | WEIGHT: 164 LBS | BODY MASS INDEX: 30.18 KG/M2 | SYSTOLIC BLOOD PRESSURE: 130 MMHG | HEART RATE: 87 BPM | DIASTOLIC BLOOD PRESSURE: 84 MMHG | TEMPERATURE: 97.1 F | HEIGHT: 62 IN

## 2020-12-08 VITALS
TEMPERATURE: 97.7 F | HEIGHT: 62 IN | WEIGHT: 164 LBS | BODY MASS INDEX: 30.18 KG/M2 | HEART RATE: 81 BPM | DIASTOLIC BLOOD PRESSURE: 77 MMHG | SYSTOLIC BLOOD PRESSURE: 123 MMHG

## 2020-12-08 PROBLEM — L72.3 SEBACEOUS CYST OF LEFT AXILLA: Status: ACTIVE | Noted: 2020-12-08

## 2020-12-08 PROCEDURE — 10061 I&D ABSCESS COMP/MULTIPLE: CPT | Performed by: SURGERY

## 2020-12-08 PROCEDURE — 99213 OFFICE O/P EST LOW 20 MIN: CPT | Performed by: NURSE PRACTITIONER

## 2020-12-08 RX ORDER — SULFAMETHOXAZOLE AND TRIMETHOPRIM 800; 160 MG/1; MG/1
1 TABLET ORAL 2 TIMES DAILY
Qty: 14 TABLET | Refills: 0 | Status: SHIPPED | OUTPATIENT
Start: 2020-12-08 | End: 2020-12-15

## 2020-12-08 NOTE — ASSESSMENT & PLAN NOTE
This likely needs further I&D with possible excision. I have referred her to Dr. Rocio Ruano for evaluation and treatment.

## 2020-12-08 NOTE — PROGRESS NOTES
Subjective:      Patient ID: Nora Noble is a 38 y.o. female who presents for:   Chief Complaint   Patient presents with    Established New Doctor     Boil under left arm with 2 heads, tender to the touch, red in middle. 2 weeks. Comes and goes       Recurrent left axillary cyst. Tender, has been draining sm amt yellow fluid    Review of Systems   Skin: Positive for wound. Past Medical History:   Diagnosis Date    Allergic rhinitis     Anemia during pregnancy in second trimester 2019    Asthma     Chronic back pain     Dyslipidemia     Dysmenorrhea     Encounter for prenatal care in second trimester of first pregnancy 2018    History of menorrhagia     Hypertension     Infertility, female     Irregular menses 4/15/2013    PCOS (polycystic ovarian syndrome)      Past Surgical History:   Procedure Laterality Date     SECTION N/A 2019     SECTION [facility]dal[facility] DO [facility]     Social History     Social History Narrative    Not on file     Family History   Problem Relation Age of Onset    High Blood Pressure Mother     Diabetes Mother     High Cholesterol Mother     High Blood Pressure Maternal Grandmother     Diabetes Maternal Grandmother     High Blood Pressure Sister     High Cholesterol Sister     Rheum Arthritis Neg Hx     Osteoarthritis Neg Hx     Asthma Neg Hx     Breast Cancer Neg Hx     Cancer Neg Hx     Heart Failure Neg Hx     Hypertension Neg Hx     Migraines Neg Hx     Ovarian Cancer Neg Hx     Rashes/Skin Problems Neg Hx     Seizures Neg Hx     Stroke Neg Hx     Thyroid Disease Neg Hx      Social History     Tobacco Use    Smoking status: Never Smoker    Smokeless tobacco: Never Used   Substance Use Topics    Alcohol use:  Yes     Alcohol/week: 2.5 standard drinks     Types: 3 Standard drinks or equivalent per week     Comment: occasion      Allergies   Allergen Reactions    Flector [Diclofenac Epolamine] Rash     Current Outpatient Medications   Medication Sig Dispense Refill    etonogestrel-ethinyl estradiol (NUVARING) 0.12-0.015 MG/24HR vaginal ring Place 1 each vaginally every 21 days Insert one (1) ring vaginally and leave in place for three (3) weeks, then remove for one (1) week. 3 each 3    ibuprofen (ADVIL;MOTRIN) 800 MG tablet Take 1 tablet by mouth every 8 hours 60 tablet 0    NIFEdipine (ADALAT CC) 60 MG extended release tablet TAKE 1 TABLET BY MOUTH ONE TIME A DAY 90 tablet 0    telmisartan (MICARDIS) 20 MG tablet Take 1 tablet by mouth daily 90 tablet 0    Vitamin D, Cholecalciferol, 25 MCG (1000 UT) CAPS Take 1,000 Units by mouth daily 90 capsule 0    Blood Pressure KIT 1 Device by Does not apply route 2 times daily Dx: I10 1 kit 0    albuterol (PROVENTIL HFA;VENTOLIN HFA) 108 (90 BASE) MCG/ACT inhaler Inhale 2 puffs into the lungs 4 times daily as needed. 1 Inhaler 1    cetirizine (ZYRTEC) 10 MG tablet Take 1 tablet by mouth daily as needed for Allergies. Current Facility-Administered Medications   Medication Dose Route Frequency Provider Last Rate Last Dose    etonogestrel (NEXPLANON) implant 68 mg  68 mg Subdermal Once Bk Naqvi DO   68 mg at 09/04/19 1301     Patient's past medical history, surgical history, family history, medications,  andallergies  were all reviewed and updated as appropriate today. Objective:     Vitals:    12/08/20 1346   BP: 130/84   Pulse: 87   Temp: 97.1 °F (36.2 °C)   SpO2: 98%     Physical Exam  Skin:     Comments: 4cm x2cm loculated cyst with 2 1cm soft heads in left axilla. Edges firm, tender. Assessment      Encounter Diagnosis   Name Primary?     Sebaceous cyst of left axilla Yes       PLAN:  Health Maintenance   Topic Date Due    DTaP/Tdap/Td vaccine (1 - Tdap) 05/07/1998    Potassium monitoring  09/22/2021    Creatinine monitoring  09/22/2021    Cervical cancer screen  11/13/2021    Lipid screen  09/22/2025    Flu vaccine  Completed    HIV screen  Completed    Hepatitis A vaccine  Aged Out    Hepatitis B vaccine  Aged Out    Hib vaccine  Aged Out    Meningococcal (ACWY) vaccine  Aged Out    Pneumococcal 0-64 years Vaccine  Aged Out      Diagnosis Orders   1. Sebaceous cyst of left axilla  Nani Penny MD, Colorectal Surgery, [facility]       Sebaceous cyst of left axilla  This likely needs further I&D with possible excision. I have referred her to Dr. Deb Davenport for evaluation and treatment. No follow-ups on file.     JODY Hernandez - CNP  [place]  12/8/2020

## 2020-12-08 NOTE — PROGRESS NOTES
Incision and Drainage Procedure Note - Hidradenitis L axilla with abscess x 2:    Chaperone/MA/RN present during entirety of procedure. Risks of surgery explained, including bleeding, infection, need for additional procedures, pain, and damage to nearby structures. Patient verbally consented to proceed. L axilla was prepped and draped in sterile fashion using betadine prep solution. 8 cc of local anesthetic (lidocaine 1%) used to anesthetize the skin and surrounding soft tissue. 11 blade scalpel used to sharply incise a cruciate at the area of maximal fluctuance. Corners of cruciate excised. Wound explored using hemostat to ensure all purulence drained. Hemostasis obtained using direct pressure. Wound dimensions: 1 x 1 cm and 1 x 1 cm  Findings: purulence and serous drainage; hidradenitis  Dressing: 4x4 guaze over top  Complications: none  EBL: 5 cc    Plan: No need to pack wound. Continue twice daily showers and dry guaze over wound. Call office or come to ED if worsening redness, swelling, fevers, chills, or any other concerns.     Follow-up: 2 weeks for wound check

## 2020-12-23 ENCOUNTER — OFFICE VISIT (OUTPATIENT)
Dept: SURGERY | Age: 40
End: 2020-12-23
Payer: COMMERCIAL

## 2020-12-23 VITALS
SYSTOLIC BLOOD PRESSURE: 125 MMHG | DIASTOLIC BLOOD PRESSURE: 77 MMHG | HEART RATE: 81 BPM | TEMPERATURE: 96.8 F | WEIGHT: 170 LBS | RESPIRATION RATE: 16 BRPM | HEIGHT: 62 IN | BODY MASS INDEX: 31.28 KG/M2

## 2020-12-23 PROCEDURE — 99212 OFFICE O/P EST SF 10 MIN: CPT | Performed by: SURGERY

## 2020-12-23 NOTE — Clinical Note
Wound improving. Will send to [facility] for further treatment of hidradenitis. Thanks!   Mimi Phelps

## 2020-12-24 NOTE — PROGRESS NOTES
[address] [address] [address]  Dept: [phone]  Dept Fax: [phone]  Loc: [phone]    Visit Date: 2020    Nora Noble is a 38 y.o. female who presents today for: Post-Op Check and Wound Check      HPI:       Nora Noble is a 38 y.o. female known to me after previous incision and drainage of left axillary abscess secondary to hidradenitis 2 weeks ago. Overall doing very well. No more pain. Denies fever, chills, nausea, vomiting.     Past Medical History:   Diagnosis Date    Allergic rhinitis     Anemia during pregnancy in second trimester 2019    Asthma     Chronic back pain     Dyslipidemia     Dysmenorrhea     Encounter for prenatal care in second trimester of first pregnancy 2018    History of menorrhagia     Hypertension     Infertility, female     Irregular menses 4/15/2013    PCOS (polycystic ovarian syndrome)      Past Surgical History:   Procedure Laterality Date     SECTION N/A 2019     SECTION performed by Magdalene Toledo DO at [facility] L&D OR       Current Outpatient Medications:     etonogestrel-ethinyl estradiol (NUVARING) 0.12-0.015 MG/24HR vaginal ring, Place 1 each vaginally every 21 days Insert one (1) ring vaginally and leave in place for three (3) weeks, then remove for one (1) week., Disp: 3 each, Rfl: 3    ibuprofen (ADVIL;MOTRIN) 800 MG tablet, Take 1 tablet by mouth every 8 hours, Disp: 60 tablet, Rfl: 0    NIFEdipine (ADALAT CC) 60 MG extended release tablet, TAKE 1 TABLET BY MOUTH ONE TIME A DAY, Disp: 90 tablet, Rfl: 0    telmisartan (MICARDIS) 20 MG tablet, Take 1 tablet by mouth daily, Disp: 90 tablet, Rfl: 0    Vitamin D, Cholecalciferol, 25 MCG (1000 UT) CAPS, Take 1,000 Units by mouth daily, Disp: 90 capsule, Rfl: 0    Blood Pressure KIT, 1 Device by Does not apply route 2 times daily Dx: I10, Disp: 1 kit, Rfl: 0 Eyes: No scleral icterus. Conjunctiva/lids normal. Vision intact grossly. Pupils equal/symmetric, reactive bilaterally. ENT: External ears/nose without defect, scars, or masses. Hearing grossly intact. No facial deformity. Lips normal, normal dentition. Neck: No masses. Trachea midline. No crepitus. Thyroid not enlarged. Cardiovascular: Normal rate. No peripheral edema. Abdominal aorta normal size to palpation. Pulmonary/Chest: Effort normal. No respiratory distress. No wheezes. No use of accessory muscles. Musculoskeletal: Normal range of motion of head/neck, without deformity, pain, or crepitus, with normal strength and tone. Normal gait. Nails without clubbing or cyanosis. Neurological: Alert and oriented to person, place, and time. No gross deficits. Sensation intact. Skin: Skin is dry. No rashes noted. No pallor. No induration of nodules. Psychiatric: Normal mood and affect. Behavior normal. Oriented to person, place, and time. Judgment and insight reasonable. Abdominal/wound: Left axilla wound healing well, though still with chronic changes from hidradenitis    Labs reviewed: none     Imaging reviewed: none    Assessment/Plan:       A/P:  Established problem(s): L axilla abscess; hidradenitis  Additional workup/treatment planned: Referral to dermatology for further treatment of hidradenitis  Risk of complications/morbidity: Moderate    Left axilla wound healing well. She has not heard back from Dr. Josy Davey of dermatology, so I will provide her with his phone number and asked her to call next week to set up an office appointment. DISPOSITION:  F/u with me as needed    My findings will be relayed to consulting practitioner or PCP via Epic note    Note completed using dictation software, please excuse any errors.     Electronically signed by Diana Condon MD on 12/24/2020 at 10:09 AM

## 2021-02-03 DIAGNOSIS — I10 ESSENTIAL HYPERTENSION: ICD-10-CM

## 2021-02-03 RX ORDER — TELMISARTAN 20 MG/1
20 TABLET ORAL DAILY
Qty: 30 TABLET | Refills: 0 | Status: SHIPPED | OUTPATIENT
Start: 2021-02-03 | End: 2021-03-15

## 2021-02-03 RX ORDER — NIFEDIPINE 60 MG/1
TABLET, FILM COATED, EXTENDED RELEASE ORAL
Qty: 30 TABLET | Refills: 0 | Status: SHIPPED | OUTPATIENT
Start: 2021-02-03 | End: 2021-03-05 | Stop reason: SDUPTHER

## 2021-03-05 DIAGNOSIS — I10 ESSENTIAL HYPERTENSION: ICD-10-CM

## 2021-03-05 RX ORDER — NIFEDIPINE 60 MG/1
TABLET, FILM COATED, EXTENDED RELEASE ORAL
Qty: 14 TABLET | Refills: 0 | Status: SHIPPED | OUTPATIENT
Start: 2021-03-05 | End: 2021-03-15

## 2021-03-05 NOTE — TELEPHONE ENCOUNTER
----- Message from Prince Aw sent at 3/5/2021 10:12 AM EST -----  Subject: Refill Request    QUESTIONS  Name of Medication? NIFEdipine (ADALAT CC) 60 MG extended release tablet  Patient-reported dosage and instructions? TAKE 1 TABLET BY MOUTH ONE TIME   A DAY  How many days do you have left? 7  Preferred Pharmacy? [address] phone number (if available)? [phone]  ---------------------------------------------------------------------------  --------------    Name of Medication? telmisartan (MICARDIS) 20 MG tablet  Patient-reported dosage and instructions? TAKE 1 TABLET BY MOUTH ONE TIME   A DAY  How many days do you have left? 7  Preferred Pharmacy? [address] phone number (if available)? [phone]  ---------------------------------------------------------------------------  --------------  Leonides ALEJANDRE  What is the best way for the office to contact you? OK to leave message on   voicemail  Preferred Call Back Phone Number?  [phone]

## 2021-04-12 ENCOUNTER — OFFICE VISIT (OUTPATIENT)
Dept: INTERNAL MEDICINE CLINIC | Age: 41
End: 2021-04-12
Payer: COMMERCIAL

## 2021-04-12 VITALS
HEART RATE: 96 BPM | BODY MASS INDEX: 32.36 KG/M2 | OXYGEN SATURATION: 100 % | WEIGHT: 171.4 LBS | DIASTOLIC BLOOD PRESSURE: 78 MMHG | HEIGHT: 61 IN | SYSTOLIC BLOOD PRESSURE: 140 MMHG

## 2021-04-12 DIAGNOSIS — E55.9 VITAMIN D DEFICIENCY: ICD-10-CM

## 2021-04-12 DIAGNOSIS — Z00.00 PHYSICAL EXAM: ICD-10-CM

## 2021-04-12 DIAGNOSIS — R31.29 MICROSCOPIC HEMATURIA: ICD-10-CM

## 2021-04-12 DIAGNOSIS — M25.512 ACUTE PAIN OF BOTH SHOULDERS: ICD-10-CM

## 2021-04-12 DIAGNOSIS — I10 ESSENTIAL HYPERTENSION: ICD-10-CM

## 2021-04-12 DIAGNOSIS — E78.5 DYSLIPIDEMIA: ICD-10-CM

## 2021-04-12 DIAGNOSIS — Z00.00 PHYSICAL EXAM: Primary | ICD-10-CM

## 2021-04-12 DIAGNOSIS — J30.89 OTHER ALLERGIC RHINITIS: ICD-10-CM

## 2021-04-12 DIAGNOSIS — M25.511 ACUTE PAIN OF BOTH SHOULDERS: ICD-10-CM

## 2021-04-12 DIAGNOSIS — J45.20 MILD INTERMITTENT ASTHMA WITHOUT COMPLICATION: ICD-10-CM

## 2021-04-12 LAB
A/G RATIO: 1.6 (ref 1.1–2.2)
ALBUMIN SERPL-MCNC: 4.6 G/DL (ref 3.4–5)
ALP BLD-CCNC: 43 U/L (ref 40–129)
ALT SERPL-CCNC: 9 U/L (ref 10–40)
ANION GAP SERPL CALCULATED.3IONS-SCNC: 10 MMOL/L (ref 3–16)
AST SERPL-CCNC: 11 U/L (ref 15–37)
BASOPHILS ABSOLUTE: 0.1 K/UL (ref 0–0.2)
BASOPHILS RELATIVE PERCENT: 0.8 %
BILIRUB SERPL-MCNC: <0.2 MG/DL (ref 0–1)
BILIRUBIN URINE: NEGATIVE
BILIRUBIN, POC: NEGATIVE
BLOOD URINE, POC: NORMAL
BLOOD, URINE: NEGATIVE
BUN BLDV-MCNC: 7 MG/DL (ref 7–20)
CALCIUM SERPL-MCNC: 9.4 MG/DL (ref 8.3–10.6)
CHLORIDE BLD-SCNC: 108 MMOL/L (ref 99–110)
CHOLESTEROL, TOTAL: 208 MG/DL (ref 0–199)
CLARITY, POC: CLEAR
CLARITY: CLEAR
CO2: 22 MMOL/L (ref 21–32)
COLOR, POC: YELLOW
COLOR: YELLOW
CREAT SERPL-MCNC: 0.7 MG/DL (ref 0.6–1.1)
CREATININE URINE: 76.7 MG/DL (ref 28–259)
EOSINOPHILS ABSOLUTE: 0.2 K/UL (ref 0–0.6)
EOSINOPHILS RELATIVE PERCENT: 1.9 %
GFR AFRICAN AMERICAN: >60
GFR NON-AFRICAN AMERICAN: >60
GLOBULIN: 2.9 G/DL
GLUCOSE BLD-MCNC: 101 MG/DL (ref 70–99)
GLUCOSE URINE, POC: NEGATIVE
GLUCOSE URINE: NEGATIVE MG/DL
HCT VFR BLD CALC: 39.8 % (ref 36–48)
HDLC SERPL-MCNC: 62 MG/DL (ref 40–60)
HEMOGLOBIN: 13.3 G/DL (ref 12–16)
KETONES, POC: NEGATIVE
KETONES, URINE: NEGATIVE MG/DL
LDL CHOLESTEROL CALCULATED: 131 MG/DL
LEUKOCYTE EST, POC: NEGATIVE
LEUKOCYTE ESTERASE, URINE: NEGATIVE
LYMPHOCYTES ABSOLUTE: 2.4 K/UL (ref 1–5.1)
LYMPHOCYTES RELATIVE PERCENT: 26 %
MCH RBC QN AUTO: 31.4 PG (ref 26–34)
MCHC RBC AUTO-ENTMCNC: 33.3 G/DL (ref 31–36)
MCV RBC AUTO: 94.2 FL (ref 80–100)
MICROALBUMIN UR-MCNC: <1.2 MG/DL
MICROALBUMIN/CREAT UR-RTO: NORMAL MG/G (ref 0–30)
MICROSCOPIC EXAMINATION: NORMAL
MONOCYTES ABSOLUTE: 0.5 K/UL (ref 0–1.3)
MONOCYTES RELATIVE PERCENT: 5.7 %
NEUTROPHILS ABSOLUTE: 6 K/UL (ref 1.7–7.7)
NEUTROPHILS RELATIVE PERCENT: 65.6 %
NITRITE, POC: NEGATIVE
NITRITE, URINE: NEGATIVE
PDW BLD-RTO: 13 % (ref 12.4–15.4)
PH UA: 6.5 (ref 5–8)
PH, POC: 6.5
PLATELET # BLD: 406 K/UL (ref 135–450)
PMV BLD AUTO: 7 FL (ref 5–10.5)
POTASSIUM SERPL-SCNC: 4.1 MMOL/L (ref 3.5–5.1)
PROTEIN UA: NEGATIVE MG/DL
PROTEIN, POC: NEGATIVE
RBC # BLD: 4.22 M/UL (ref 4–5.2)
SODIUM BLD-SCNC: 140 MMOL/L (ref 136–145)
SPECIFIC GRAVITY UA: 1.01 (ref 1–1.03)
SPECIFIC GRAVITY, POC: 1.02
TOTAL PROTEIN: 7.5 G/DL (ref 6.4–8.2)
TRIGL SERPL-MCNC: 75 MG/DL (ref 0–150)
TSH REFLEX: 0.64 UIU/ML (ref 0.27–4.2)
URINE REFLEX TO CULTURE: NORMAL
URINE TYPE: NORMAL
UROBILINOGEN, POC: NORMAL
UROBILINOGEN, URINE: 0.2 E.U./DL
VITAMIN D 25-HYDROXY: 30.9 NG/ML
VLDLC SERPL CALC-MCNC: 15 MG/DL
WBC # BLD: 9.1 K/UL (ref 4–11)

## 2021-04-12 PROCEDURE — 93000 ELECTROCARDIOGRAM COMPLETE: CPT | Performed by: INTERNAL MEDICINE

## 2021-04-12 PROCEDURE — 99396 PREV VISIT EST AGE 40-64: CPT | Performed by: INTERNAL MEDICINE

## 2021-04-12 PROCEDURE — 81002 URINALYSIS NONAUTO W/O SCOPE: CPT | Performed by: INTERNAL MEDICINE

## 2021-04-12 RX ORDER — NIFEDIPINE 60 MG/1
60 TABLET, FILM COATED, EXTENDED RELEASE ORAL DAILY
Qty: 90 TABLET | Refills: 0 | Status: SHIPPED | OUTPATIENT
Start: 2021-04-12 | End: 2021-06-11

## 2021-04-12 RX ORDER — ALBUTEROL SULFATE 90 UG/1
2 AEROSOL, METERED RESPIRATORY (INHALATION) 4 TIMES DAILY PRN
Qty: 1 INHALER | Refills: 0 | Status: SHIPPED | OUTPATIENT
Start: 2021-04-12

## 2021-04-12 RX ORDER — TELMISARTAN 20 MG/1
TABLET ORAL
Qty: 90 TABLET | Refills: 0 | Status: SHIPPED | OUTPATIENT
Start: 2021-04-12 | End: 2021-07-14 | Stop reason: SDUPTHER

## 2021-04-12 RX ORDER — FAMOTIDINE 20 MG
1 TABLET ORAL DAILY
Qty: 90 CAPSULE | Refills: 0 | Status: SHIPPED | OUTPATIENT
Start: 2021-04-12 | End: 2021-07-21 | Stop reason: SDUPTHER

## 2021-04-12 RX ORDER — TELMISARTAN 20 MG/1
TABLET ORAL
Qty: 7 TABLET | Refills: 0 | Status: CANCELLED | OUTPATIENT
Start: 2021-04-12

## 2021-04-12 RX ORDER — NIFEDIPINE 60 MG/1
TABLET, FILM COATED, EXTENDED RELEASE ORAL
Qty: 7 TABLET | Refills: 0 | Status: CANCELLED | OUTPATIENT
Start: 2021-04-12

## 2021-04-12 ASSESSMENT — PATIENT HEALTH QUESTIONNAIRE - PHQ9
SUM OF ALL RESPONSES TO PHQ9 QUESTIONS 1 & 2: 0
1. LITTLE INTEREST OR PLEASURE IN DOING THINGS: 0
SUM OF ALL RESPONSES TO PHQ QUESTIONS 1-9: 0

## 2021-04-13 LAB
ESTIMATED AVERAGE GLUCOSE: 114 MG/DL
HBA1C MFR BLD: 5.6 %

## 2021-04-27 ENCOUNTER — OFFICE VISIT (OUTPATIENT)
Dept: INTERNAL MEDICINE CLINIC | Age: 41
End: 2021-04-27
Payer: COMMERCIAL

## 2021-04-27 ENCOUNTER — HOSPITAL ENCOUNTER (OUTPATIENT)
Dept: GENERAL RADIOLOGY | Age: 41
Discharge: HOME | End: 2021-04-27
Payer: COMMERCIAL

## 2021-04-27 VITALS
OXYGEN SATURATION: 100 % | DIASTOLIC BLOOD PRESSURE: 84 MMHG | SYSTOLIC BLOOD PRESSURE: 126 MMHG | HEART RATE: 84 BPM | HEIGHT: 61 IN | BODY MASS INDEX: 32.25 KG/M2 | WEIGHT: 170.8 LBS

## 2021-04-27 DIAGNOSIS — M25.561 ACUTE PAIN OF RIGHT KNEE: ICD-10-CM

## 2021-04-27 DIAGNOSIS — M25.561 ACUTE PAIN OF RIGHT KNEE: Primary | ICD-10-CM

## 2021-04-27 PROCEDURE — 99213 OFFICE O/P EST LOW 20 MIN: CPT | Performed by: NURSE PRACTITIONER

## 2021-04-27 PROCEDURE — 73562 X-RAY EXAM OF KNEE 3: CPT

## 2021-04-27 ASSESSMENT — ENCOUNTER SYMPTOMS
RESPIRATORY NEGATIVE: 1
BACK PAIN: 1
GASTROINTESTINAL NEGATIVE: 1

## 2021-04-27 ASSESSMENT — PATIENT HEALTH QUESTIONNAIRE - PHQ9
2. FEELING DOWN, DEPRESSED OR HOPELESS: 0
1. LITTLE INTEREST OR PLEASURE IN DOING THINGS: 0
SUM OF ALL RESPONSES TO PHQ QUESTIONS 1-9: 0

## 2021-04-27 NOTE — PROGRESS NOTES
Brodie Mancilla   YOB: 1980    Date of Visit:  2021      Chief Complaint   Patient presents with    Knee Pain     right knee         HPI Presents to the office c/o knee pain that started 1 week ago. Says she has been having frequent joint pain throughout her body for the past year. Has a family history of RA. Denies any injury. Knee Pain   The incident occurred 5 to 7 days ago. There was no injury mechanism. The pain is present in the right knee. The pain is at a severity of 6/10. The pain is moderate. The pain has been constant since onset. Pertinent negatives include no inability to bear weight, loss of motion, loss of sensation, muscle weakness, numbness or tingling. She reports no foreign bodies present. The symptoms are aggravated by weight bearing (bending). She has tried NSAIDs for the symptoms. The treatment provided moderate relief. Allergies   Allergen Reactions    Flector [Diclofenac Epolamine] Rash     No outpatient medications have been marked as taking for the 21 encounter (Office Visit) with FADI Messina        Health Maintenance Due   Topic    COVID-19 Vaccine (1)     Past Medical History:   Diagnosis Date    Allergic rhinitis     Anemia during pregnancy in second trimester 2019    Asthma     Chronic back pain     Dyslipidemia     Dysmenorrhea     Encounter for prenatal care in second trimester of first pregnancy 2018    History of menorrhagia     Hypertension     Infertility, female     Irregular menses 4/15/2013    PCOS (polycystic ovarian syndrome)      Past Surgical History:   Procedure Laterality Date     SECTION N/A 2019     SECTION performed by Marian Bertrand DO at [facility] L&D OR     Social History     Tobacco History     Smoking Status  Never Smoker    Smokeless Tobacco Use  Never Used          Alcohol History     Alcohol Use Status  Yes Drinks/Week  3 Standard drinks or equivalent per week Amount 2.5 standard drinks of alcohol/wk Comment  occasion           Drug Use     Drug Use Status  No          Sexual Activity     Sexually Active  Yes Partners  Male Comment  Spouse / Georgia Nix               Review of Systems   Constitutional: Negative. HENT: Negative. Respiratory: Negative. Cardiovascular: Negative. Gastrointestinal: Negative. Genitourinary: Negative. Musculoskeletal: Positive for arthralgias and back pain. Negative for gait problem, joint swelling and myalgias. Neurological: Negative for tingling and numbness. Psychiatric/Behavioral: Negative. Vitals:    04/27/21 0850   BP: 126/84   Site: Left Upper Arm   Position: Sitting   Cuff Size: Large Adult   Pulse: 84   SpO2: 100%   Weight: 170 lb 12.8 oz (77.5 kg)   Height: 5' 1\" (1.549 m)     Physical Exam  Constitutional:       General: She is not in acute distress. Appearance: Normal appearance. She is not ill-appearing, toxic-appearing or diaphoretic. HENT:      Mouth/Throat:      Mouth: Mucous membranes are moist.   Eyes:      Conjunctiva/sclera: Conjunctivae normal.   Neck:      Musculoskeletal: Normal range of motion and neck supple. No neck rigidity. Cardiovascular:      Rate and Rhythm: Normal rate and regular rhythm. Pulses: Normal pulses. Heart sounds: Normal heart sounds. No murmur. No friction rub. No gallop. Pulmonary:      Effort: Pulmonary effort is normal. No respiratory distress. Breath sounds: Normal breath sounds. No stridor. No wheezing, rhonchi or rales. Abdominal:      Palpations: Abdomen is soft. Musculoskeletal:      Right knee: She exhibits normal range of motion, no swelling, no effusion and normal patellar mobility. Tenderness (anterior) found. Skin:     General: Skin is warm and dry. Neurological:      Mental Status: She is alert and oriented to person, place, and time.    Psychiatric:         Mood and Affect: Mood normal.         Behavior: Behavior normal.         Thought Content: Thought content normal.         Judgment: Judgment normal.         Assessment/Plan     1. Acute pain of right knee  - XR KNEE RIGHT (3 VIEWS); Future  - Continue ibuprofen 800 mg as prescribed. Discussed medications with patient, who voiced understanding of their use and indications. All questions answered. Pt is advised to call if symptoms worsen or do not improve.

## 2021-04-29 ENCOUNTER — TELEPHONE (OUTPATIENT)
Dept: INTERNAL MEDICINE CLINIC | Age: 41
End: 2021-04-29

## 2021-04-29 NOTE — TELEPHONE ENCOUNTER
Pt returned call. Discussed results. Advised to follow up with Dr. Harris Mathis to discuss joint pain and further concerns. Patient will call to schedule appointment.

## 2021-07-14 DIAGNOSIS — I10 ESSENTIAL HYPERTENSION: ICD-10-CM

## 2021-07-15 RX ORDER — TELMISARTAN 20 MG/1
TABLET ORAL
Qty: 90 TABLET | Refills: 0 | Status: SHIPPED | OUTPATIENT
Start: 2021-07-15 | End: 2021-10-20 | Stop reason: SDUPTHER

## 2021-07-21 ENCOUNTER — OFFICE VISIT (OUTPATIENT)
Dept: INTERNAL MEDICINE CLINIC | Age: 41
End: 2021-07-21
Payer: COMMERCIAL

## 2021-07-21 VITALS
WEIGHT: 171.4 LBS | OXYGEN SATURATION: 99 % | HEART RATE: 88 BPM | HEIGHT: 61 IN | SYSTOLIC BLOOD PRESSURE: 122 MMHG | BODY MASS INDEX: 32.36 KG/M2 | DIASTOLIC BLOOD PRESSURE: 78 MMHG

## 2021-07-21 DIAGNOSIS — M25.561 ACUTE PAIN OF RIGHT KNEE: ICD-10-CM

## 2021-07-21 DIAGNOSIS — J45.20 MILD INTERMITTENT ASTHMA WITHOUT COMPLICATION: ICD-10-CM

## 2021-07-21 DIAGNOSIS — E55.9 VITAMIN D DEFICIENCY: ICD-10-CM

## 2021-07-21 DIAGNOSIS — J30.89 OTHER ALLERGIC RHINITIS: ICD-10-CM

## 2021-07-21 DIAGNOSIS — M25.512 ACUTE PAIN OF BOTH SHOULDERS: ICD-10-CM

## 2021-07-21 DIAGNOSIS — M25.511 ACUTE PAIN OF BOTH SHOULDERS: ICD-10-CM

## 2021-07-21 DIAGNOSIS — I10 ESSENTIAL HYPERTENSION: Primary | ICD-10-CM

## 2021-07-21 DIAGNOSIS — E78.5 DYSLIPIDEMIA: ICD-10-CM

## 2021-07-21 PROCEDURE — 99214 OFFICE O/P EST MOD 30 MIN: CPT | Performed by: INTERNAL MEDICINE

## 2021-07-21 RX ORDER — FAMOTIDINE 20 MG
1 TABLET ORAL DAILY
Qty: 90 CAPSULE | Refills: 0 | Status: SHIPPED | OUTPATIENT
Start: 2021-07-21 | End: 2022-02-07 | Stop reason: SDUPTHER

## 2021-07-21 RX ORDER — NIFEDIPINE 60 MG/1
TABLET, FILM COATED, EXTENDED RELEASE ORAL
Qty: 90 TABLET | Refills: 0 | Status: SHIPPED | OUTPATIENT
Start: 2021-07-21 | End: 2021-10-20 | Stop reason: SDUPTHER

## 2021-07-21 RX ORDER — IBUPROFEN 800 MG/1
800 TABLET ORAL EVERY 8 HOURS
Qty: 60 TABLET | Refills: 0 | Status: SHIPPED | OUTPATIENT
Start: 2021-07-21 | End: 2022-05-19 | Stop reason: SDUPTHER

## 2021-07-21 SDOH — ECONOMIC STABILITY: FOOD INSECURITY: WITHIN THE PAST 12 MONTHS, YOU WORRIED THAT YOUR FOOD WOULD RUN OUT BEFORE YOU GOT MONEY TO BUY MORE.: NEVER TRUE

## 2021-07-21 SDOH — ECONOMIC STABILITY: FOOD INSECURITY: WITHIN THE PAST 12 MONTHS, THE FOOD YOU BOUGHT JUST DIDN'T LAST AND YOU DIDN'T HAVE MONEY TO GET MORE.: NEVER TRUE

## 2021-07-21 ASSESSMENT — PATIENT HEALTH QUESTIONNAIRE - PHQ9
SUM OF ALL RESPONSES TO PHQ QUESTIONS 1-9: 0
SUM OF ALL RESPONSES TO PHQ QUESTIONS 1-9: 0
SUM OF ALL RESPONSES TO PHQ9 QUESTIONS 1 & 2: 0
SUM OF ALL RESPONSES TO PHQ QUESTIONS 1-9: 0
1. LITTLE INTEREST OR PLEASURE IN DOING THINGS: 0
2. FEELING DOWN, DEPRESSED OR HOPELESS: 0

## 2021-07-21 ASSESSMENT — SOCIAL DETERMINANTS OF HEALTH (SDOH): HOW HARD IS IT FOR YOU TO PAY FOR THE VERY BASICS LIKE FOOD, HOUSING, MEDICAL CARE, AND HEATING?: NOT HARD AT ALL

## 2021-07-21 NOTE — PROGRESS NOTES
SUBJECTIVE:  Hiram Tucker is a 42 y.o. female [address] [place]   Chief Complaint   Patient presents with    Hypertension    Other        PT HERE FOR EVAL    HTN - TAKING MEDS . BP NOTED.  + DIET / EXERCISE COMPLIANCE.  NO HEADACHE , DIZZINESS No.   DYSLIPIDEMIA -  ? EXERCISE / DIET COMPLIANCE . PREVIOUS LABS D/W PT  ASTHMA - DENIES WHEEZING, NO SOB, NO RECENT INHALER USE   VIT D DEF - TAKING MED .  LAB D/W PT  ALLERGIC RHINITIS -  OCC  NASAL CONGESTION , NO POSTNASAL DRAINAGE , NO SINUS PRESSURE, NO HA, OCC SNEEZING, NO WATERY ITCHY EYES. SHOULDER PAIN HOLLIS  -  INTERMITTENT. DULL ACHE,  ? RAD, PAIN SCALE 5-6/10 . DENIES TRAUMA. ? NO NUMBNESS / NO TINGLING  RT KNEE PAIN - PERSISTENT. DULL ACHE, NO RAD, PAIN SCALE 5/10. NO SWELLING. NO TRAUMA. X RAY D/W PT      DENIES CP, No SOB, No PALPITATIONS, [address] [place] [address], NO F/C  No ABD PAIN, No N/V, No DIARRHEA, No CONSTIPATION, No MELENA, No HEMATOCHEZIA. No DYSURIA, No FREQ, No URGENCY, No HEMATURIA    PMH: REVIEWED AND UPDATED TODAY    PSH: REVIEWED AND UPDATED TODAY    SOCIAL HX: REVIEWED AND UPDATED TODAY    FAMILY HX: REVIEWED AND UPDATED TODAY    ALLERGY:  Flector [diclofenac epolamine]    MEDS: REVIEWED  Prior to Visit Medications    Medication Sig Taking? Authorizing Provider   telmisartan (MICARDIS) 20 MG tablet TAKE 1 TABLET BY MOUTH ONE TIME A DAY Yes Arsen Aguilar MD   NIFEdipine (ADALAT CC) 60 MG extended release tablet TAKE 1 TABLET BY MOUTH ONE TIME A DAY Yes Arsen Aguilar MD   Vitamin D, Cholecalciferol, 25 MCG (1000 UT) CAPS Take 1,000 Units by mouth daily Yes Arsen Aguilar MD   albuterol sulfate  (90 Base) MCG/ACT inhaler Inhale 2 puffs into the lungs 4 times daily as needed for Wheezing or Shortness of Breath Yes Arsen Aguilar MD   etonogestrel-ethinyl estradiol (NUVARING) 0.12-0.015 MG/24HR vaginal ring Place 1 each vaginally every 21 days Insert one (1) ring vaginally and leave in place for three (3) weeks, then remove for one (1) week.  Yes [place] Cap Federer, DO   ibuprofen (ADVIL;MOTRIN) 800 MG tablet Take 1 tablet by mouth every 8 hours Yes Ara Riley MD   Blood Pressure KIT 1 Device by Does not apply route 2 times daily Dx: Z39 Yes [place] Cap Federer, DO   cetirizine (ZYRTEC) 10 MG tablet Take 1 tablet by mouth daily as needed for Allergies. Yes Ara Riley MD       ROS: COMPREHENSIVE ROS AS IN HX, REST -VE  History obtained from chart review and the patient      OBJECTIVE:   NURSING NOTE AND VITALS REVIEWED  /74 (Site: Left Upper Arm, Position: Sitting, Cuff Size: Large Adult)   Pulse 88   Ht 5' 1\" (1.549 m)   Wt 171 lb 6.4 oz (77.7 kg)   SpO2 99%   Breastfeeding No   BMI 32.39 kg/m²     NO ACUTE DISTRESS    REPEAT BP: 122/78 (LT), NO ORTHOSTASIS     Body mass index is 32.39 kg/m². HEENT: NO PALLOR, ANICTERIC, PERRLA, EOMI, NO CONJUNCTIVAL ERYTHEMA,                 NO SINUS TENDERNESS  NECK:  SUPPLE, TRACHEA MIDLINE, NT, NO JVD, NO CB, NO LA, NO TM, NO STIFFNESS  CHEST: RESPY EFFORT NL, GOOD AE, NO W/R/C  HEART: S1S2+ REG, NO M/G/R  ABD: SOFT, NT, NO HSM, BS+  EXT: NO EDEMA, NT, PULSES +. MILTON'S -VE  NEURO: ALERT AND ORIENTED X 3, NO MENINGEAL SIGNS, NO TREMORS, NL GAIT, NO FOCAL DEFICITS  PSYCH: FAIRLY GOOD AFFECT  BACK: NT, NO ROM, NO CVA TENDERNESS  SHOULDER: NT, OCC PAIN WITH MVT - LT, NO ROM  KNEE: NT, NO ROM      PREVIOUS LABS / X RAYS  REVIEWED AND D/W PT      ASSESSMENT / PLAN:     Diagnosis Orders   1. Essential hypertension  COUNSELLED. CONTINUE MEDS. ADVISED LOW NA+ / DASH DIET/ EXERCISE. MONITOR. GOAL </= 120/80  MAKE CHANGES AS NEEDED. 2. Dyslipidemia  COUNSELLED. DEFERRED MED  ADVISED LOW FAT / CHOL DIET/ EXERCISE.  MONITOR. GOALS D/W PT.  MAKE CHANGES AS NEEDED. 3. Mild intermittent asthma without complication  COUNSELLED. ALBUTEROL PRN. MONITOR. MONITOR FOR TRIGGERS- ENVIRONMENTAL MODIFICATION. MAKE CHANGES AS NEEDED. 4. Vitamin D deficiency  COUNSELLED.  MONITOR ON VIT D SUPPLEMENT. MAKE CHANGES AS NEEDED. 5. Other allergic rhinitis  COUNSELLED. MED PRN  MAKE CHANGES AS NEEDED. 6. Acute pain of both shoulders  COUNSELLED. EXERCISES. ANALGESICS PRN. MONITOR. PT DEFERRED PHYSICAL THERAPY  ADVISED ON HOME EXERCISES  MAKE CHANGES AS NEEDED. 7. Acute pain of right knee  COUNSELLED. EXERCISES. ANALGESICS PRN. MONITOR. ADVISED ON HOME EXERCISES  DEFERRED PHYSICAL THERAPY  MAKE CHANGES AS NEEDED.                   F/U COVID VACCINE        MEDICATION SIDE EFFECTS D/W PATIENT    RETURN TO CLINIC WITHIN 3 MONTHS / PRN

## 2021-07-21 NOTE — PATIENT INSTRUCTIONS
8091-3222 Healthwise, [employer]. Care instructions adapted under license by [facility] ([facility]). If you have questions about a medical condition or this instruction, always ask your healthcare professional. [address] any warranty or liability for your use of this information. Patient Education        Knee: Exercises  Introduction  Here are some examples of exercises for you to try. The exercises may be suggested for a condition or for rehabilitation. Start each exercise slowly. Ease off the exercises if you start to have pain. You will be told when to start these exercises and which ones will work best for you. How to do the exercises  Quad sets   1. Sit with your leg straight and supported on the floor or a firm bed. (If you feel discomfort in the front or back of your knee, place a small towel roll under your knee.)  2. Tighten the muscles on top of your thigh by pressing the back of your knee flat down to the floor. (If you feel discomfort under your kneecap, place a small towel roll under your knee.)  3. Hold for about 6 seconds, then rest for up to 10 seconds. 4. Do 8 to 12 repetitions several times a day. Straight-leg raises to the front   1. Lie on your back with your good knee bent so that your foot rests flat on the floor. Your injured leg should be straight. Make sure that your low back has a normal curve. You should be able to slip your flat hand in between the floor and the small of your back, with your palm touching the floor and your back touching the back of your hand. 2. Tighten the thigh muscles in the injured leg by pressing the back of your knee flat down to the floor. Hold your knee straight. 3. Keeping the thigh muscles tight, lift your injured leg up so that your heel is about 12 inches off the floor. Hold for about 6 seconds and then lower slowly. 4. Do 8 to 12 repetitions, 3 times a day. Straight-leg raises to the outside   1.  Lie on your side, with your injured leg on top. 2. Tighten the front thigh muscles of your injured leg to keep your knee straight. 3. Keep your hip and your leg straight in line with the rest of your body, and keep your knee pointing forward. Do not drop your hip back. 4. Lift your injured leg straight up toward the ceiling, about 12 inches off the floor. Hold for about 6 seconds, then slowly lower your leg. 5. Do 8 to 12 repetitions. Straight-leg raises to the back   1. Lie on your stomach, and lift your leg straight up behind you (toward the ceiling). 2. Lift your toes about 6 inches off the floor, hold for about 6 seconds, then lower slowly. 3. Do 8 to 12 repetitions. Straight-leg raises to the inside   1. Lie on the side of your body with the injured leg. 2. You can either prop your other (good) leg up on a chair, or you can bend your good knee and put that foot in front of your injured knee. Do not drop your hip back. 3. Tighten the muscles on the front of your thigh to straighten your injured knee. 4. Keep your kneecap pointing forward, and lift your whole leg up toward the ceiling about 6 inches. Hold for about 6 seconds, then lower slowly. 5. Do 8 to 12 repetitions. Heel dig bridging   1. Lie on your back with both knees bent and your ankles bent so that only your heels are digging into the floor. Your knees should be bent about 90 degrees. 2. Then push your heels into the floor, squeeze your buttocks, and lift your hips off the floor until your shoulders, hips, and knees are all in a straight line. 3. Hold for about 6 seconds as you continue to breathe normally, and then slowly lower your hips back down to the floor and rest for up to 10 seconds. 4. Do 8 to 12 repetitions. Hamstring curls   1. Lie on your stomach with your knees straight. If your kneecap is uncomfortable, roll up a washcloth and put it under your leg just above your kneecap.   2. Lift the foot of your injured leg by bending the knee so of: November 16, 2020               Content Version: 12.9  © 9576-4092 Healthwise, Incorporated. Care instructions adapted under license by [facility] ([facility]). If you have questions about a medical condition or this instruction, always ask your healthcare professional. [address] any warranty or liability for your use of this information.

## 2021-07-22 ENCOUNTER — IMMUNIZATION (OUTPATIENT)
Dept: FAMILY MEDICINE CLINIC | Age: 41
End: 2021-07-22
Payer: COMMERCIAL

## 2021-07-22 PROCEDURE — 0001A COVID-19, PFIZER VACCINE 30MCG/0.3ML DOSE: CPT | Performed by: FAMILY MEDICINE

## 2021-07-22 PROCEDURE — 91300 COVID-19, PFIZER VACCINE 30MCG/0.3ML DOSE: CPT | Performed by: FAMILY MEDICINE

## 2021-08-04 ENCOUNTER — TELEPHONE (OUTPATIENT)
Dept: INTERNAL MEDICINE CLINIC | Age: 41
End: 2021-08-04

## 2021-08-04 NOTE — TELEPHONE ENCOUNTER
----- Message from [facility] sent at 8/3/2021  9:43 AM EDT -----  Subject: Message to Provider    QUESTIONS  Information for Provider? Pt have a boil under her right arm. She would   like to have medication sent to pharmacy. Pt stated she did not want to   come in for an appt.   ---------------------------------------------------------------------------  --------------  CALL BACK INFO  What is the best way for the office to contact you? OK to leave message on   voicemail  Preferred Call Back Phone Number? [phone]  ---------------------------------------------------------------------------  --------------  SCRIPT ANSWERS  Relationship to Patient?  Self

## 2021-08-12 ENCOUNTER — IMMUNIZATION (OUTPATIENT)
Dept: FAMILY MEDICINE CLINIC | Age: 41
End: 2021-08-12
Payer: COMMERCIAL

## 2021-08-12 PROCEDURE — 0002A COVID-19, PFIZER VACCINE 30MCG/0.3ML DOSE: CPT | Performed by: NURSE PRACTITIONER

## 2021-08-12 PROCEDURE — 91300 COVID-19, PFIZER VACCINE 30MCG/0.3ML DOSE: CPT | Performed by: NURSE PRACTITIONER

## 2021-10-20 DIAGNOSIS — I10 ESSENTIAL HYPERTENSION: ICD-10-CM

## 2021-10-20 RX ORDER — TELMISARTAN 20 MG/1
TABLET ORAL
Qty: 90 TABLET | Refills: 0 | Status: SHIPPED | OUTPATIENT
Start: 2021-10-20 | End: 2022-02-07 | Stop reason: SDUPTHER

## 2021-10-20 RX ORDER — NIFEDIPINE 60 MG/1
TABLET, FILM COATED, EXTENDED RELEASE ORAL
Qty: 90 TABLET | Refills: 0 | Status: SHIPPED | OUTPATIENT
Start: 2021-10-20 | End: 2022-02-07 | Stop reason: SDUPTHER

## 2021-11-18 RX ORDER — ETONOGESTREL AND ETHINYL ESTRADIOL 11.7; 2.7 MG/1; MG/1
INSERT, EXTENDED RELEASE VAGINAL
Qty: 3 EACH | Refills: 1 | Status: SHIPPED | OUTPATIENT
Start: 2021-11-18 | End: 2022-02-08 | Stop reason: SDUPTHER

## 2021-11-18 NOTE — TELEPHONE ENCOUNTER
[phone] (home) [phone] (work)    Placed call to patient, scheduled for annual 1/4/2022. Please see pended medication. Stated she is doing well on the ring.      Routing to Dr. Caitlin Connelly

## 2022-01-04 ENCOUNTER — OFFICE VISIT (OUTPATIENT)
Dept: OBGYN CLINIC | Age: 42
End: 2022-01-04
Payer: COMMERCIAL

## 2022-01-04 VITALS
DIASTOLIC BLOOD PRESSURE: 80 MMHG | WEIGHT: 173.6 LBS | HEIGHT: 62 IN | SYSTOLIC BLOOD PRESSURE: 130 MMHG | HEART RATE: 75 BPM | BODY MASS INDEX: 31.94 KG/M2 | TEMPERATURE: 97.3 F

## 2022-01-04 DIAGNOSIS — Z98.891 S/P PRIMARY LOW TRANSVERSE C-SECTION: ICD-10-CM

## 2022-01-04 DIAGNOSIS — I10 PRIMARY HYPERTENSION: ICD-10-CM

## 2022-01-04 DIAGNOSIS — Z01.419 WOMEN'S ANNUAL ROUTINE GYNECOLOGICAL EXAMINATION: Primary | ICD-10-CM

## 2022-01-04 DIAGNOSIS — Z30.44 ENCOUNTER FOR SURVEILLANCE OF VAGINAL RING HORMONAL CONTRACEPTIVE DEVICE: ICD-10-CM

## 2022-01-04 DIAGNOSIS — Z12.4 PAP SMEAR FOR CERVICAL CANCER SCREENING: ICD-10-CM

## 2022-01-04 DIAGNOSIS — F12.91 HISTORY OF MARIJUANA USE: ICD-10-CM

## 2022-01-04 PROCEDURE — 99396 PREV VISIT EST AGE 40-64: CPT | Performed by: OBSTETRICS & GYNECOLOGY

## 2022-01-09 ASSESSMENT — ENCOUNTER SYMPTOMS
DIARRHEA: 0
SHORTNESS OF BREATH: 0
ABDOMINAL DISTENTION: 0
VOMITING: 0
ABDOMINAL PAIN: 0
CONSTIPATION: 0
NAUSEA: 0

## 2022-01-10 NOTE — PROGRESS NOTES
Thyroid: No thyromegaly. Trachea: Trachea normal.   Cardiovascular:      Rate and Rhythm: Normal rate and regular rhythm. Heart sounds: Normal heart sounds, S1 normal and S2 normal.   Pulmonary:      Effort: Pulmonary effort is normal. No respiratory distress. Breath sounds: Normal breath sounds. Chest:   Breasts: Breasts are symmetrical.      Right: No inverted nipple, mass, nipple discharge, skin change or tenderness. Left: No inverted nipple, mass, nipple discharge, skin change or tenderness. Abdominal:      General: Bowel sounds are normal. There is no distension. Palpations: Abdomen is soft. Abdomen is not rigid. There is no mass. Tenderness: There is no abdominal tenderness. There is no guarding or rebound. Genitourinary:     General: Normal vulva. Exam position: Lithotomy position. Labia:         Right: No rash, tenderness, lesion or injury. Left: No rash, tenderness, lesion or injury. Vagina: No signs of injury. No vaginal discharge, erythema, tenderness or bleeding. Cervix: No cervical motion tenderness, discharge or friability. Uterus: Not tender. Adnexa:         Right: No mass, tenderness or fullness. Left: No mass, tenderness or fullness. Musculoskeletal:         General: Normal range of motion. Cervical back: Neck supple. Skin:     General: Skin is warm and dry. Findings: No erythema or rash. Nails: There is no clubbing. Neurological:      Mental Status: She is alert and oriented to person, place, and time. Psychiatric:         Mood and Affect: Mood normal.         Speech: Speech normal.         Behavior: Behavior normal. Behavior is cooperative. Thought Content: Thought content normal.         Judgment: Judgment normal.         Assessment:       Diagnosis Orders   1. Women's annual routine gynecological examination  PAP SMEAR   2.  Pap smear for cervical cancer screening  PAP SMEAR 3. S/P primary low transverse      4. Primary hypertension     5. History of marijuana use     6. Encounter for surveillance of vaginal ring hormonal contraceptive device             Plan:      Orders Placed This Encounter   Procedures    PAP SMEAR    PAP SMEAR     Rx refill Nuvaring  Monitor for any issues with blood pressure. Follow up prn and 1 year for well woman examination.            Sharyle Matter Federer, DO

## 2022-02-07 ENCOUNTER — OFFICE VISIT (OUTPATIENT)
Dept: INTERNAL MEDICINE CLINIC | Age: 42
End: 2022-02-07
Payer: COMMERCIAL

## 2022-02-07 VITALS
DIASTOLIC BLOOD PRESSURE: 80 MMHG | OXYGEN SATURATION: 98 % | TEMPERATURE: 97.6 F | BODY MASS INDEX: 32.85 KG/M2 | SYSTOLIC BLOOD PRESSURE: 120 MMHG | HEIGHT: 61 IN | HEART RATE: 83 BPM | WEIGHT: 174 LBS

## 2022-02-07 DIAGNOSIS — I10 PRIMARY HYPERTENSION: ICD-10-CM

## 2022-02-07 DIAGNOSIS — J30.89 OTHER ALLERGIC RHINITIS: ICD-10-CM

## 2022-02-07 DIAGNOSIS — E78.5 DYSLIPIDEMIA: Primary | ICD-10-CM

## 2022-02-07 DIAGNOSIS — J45.20 MILD INTERMITTENT ASTHMA WITHOUT COMPLICATION: ICD-10-CM

## 2022-02-07 DIAGNOSIS — E66.9 OBESITY (BMI 30-39.9): ICD-10-CM

## 2022-02-07 DIAGNOSIS — E55.9 VITAMIN D DEFICIENCY: ICD-10-CM

## 2022-02-07 DIAGNOSIS — Z83.3 FAMILY HISTORY OF DIABETES MELLITUS (DM): ICD-10-CM

## 2022-02-07 DIAGNOSIS — M25.50 MULTIPLE JOINT PAIN: ICD-10-CM

## 2022-02-07 LAB
CHP ED QC CHECK: NORMAL
GLUCOSE BLD-MCNC: 96 MG/DL
HBA1C MFR BLD: 5.6 %

## 2022-02-07 PROCEDURE — 82962 GLUCOSE BLOOD TEST: CPT | Performed by: INTERNAL MEDICINE

## 2022-02-07 PROCEDURE — 83036 HEMOGLOBIN GLYCOSYLATED A1C: CPT | Performed by: INTERNAL MEDICINE

## 2022-02-07 PROCEDURE — 99214 OFFICE O/P EST MOD 30 MIN: CPT | Performed by: INTERNAL MEDICINE

## 2022-02-07 RX ORDER — TELMISARTAN 20 MG/1
TABLET ORAL
Qty: 90 TABLET | Refills: 0 | Status: CANCELLED | OUTPATIENT
Start: 2022-02-07

## 2022-02-07 RX ORDER — TELMISARTAN 20 MG/1
TABLET ORAL
Qty: 90 TABLET | Refills: 0 | Status: SHIPPED | OUTPATIENT
Start: 2022-02-07 | End: 2022-05-19 | Stop reason: SDUPTHER

## 2022-02-07 RX ORDER — NIFEDIPINE 60 MG/1
TABLET, FILM COATED, EXTENDED RELEASE ORAL
Qty: 90 TABLET | Refills: 0 | Status: CANCELLED | OUTPATIENT
Start: 2022-02-07

## 2022-02-07 RX ORDER — NIFEDIPINE 60 MG/1
TABLET, FILM COATED, EXTENDED RELEASE ORAL
Qty: 90 TABLET | Refills: 0 | Status: SHIPPED | OUTPATIENT
Start: 2022-02-07 | End: 2022-05-19 | Stop reason: SDUPTHER

## 2022-02-07 RX ORDER — FAMOTIDINE 20 MG
1 TABLET ORAL DAILY
Qty: 90 CAPSULE | Refills: 0 | Status: SHIPPED | OUTPATIENT
Start: 2022-02-07 | End: 2022-06-13 | Stop reason: SDUPTHER

## 2022-02-07 ASSESSMENT — PATIENT HEALTH QUESTIONNAIRE - PHQ9
SUM OF ALL RESPONSES TO PHQ QUESTIONS 1-9: 0
1. LITTLE INTEREST OR PLEASURE IN DOING THINGS: 0
2. FEELING DOWN, DEPRESSED OR HOPELESS: 0
SUM OF ALL RESPONSES TO PHQ9 QUESTIONS 1 & 2: 0
SUM OF ALL RESPONSES TO PHQ QUESTIONS 1-9: 0

## 2022-02-07 NOTE — PATIENT INSTRUCTIONS
TAKE MED AS ADVISED    DIET/ EXERCISE. FOLLOW UP WITHIN 4 MONTHS / AS NEEDED    FOLLOW UP FOR FASTING [facility] Laboratory Locations - No appointment necessary. Sites open Monday to Friday. @ indicates the location is open Saturdays. Call your preferred location for test preparation, business hours and other information you need. [employer] accepts [employer]. [facility]    @ [place] [facility] S[facility]. 3 [facility] [address] [place] [address]. [facility], [address]   Ph: [phone] [facility] Lab Svcs. [address]   Ph: [phone]  @ [place] Lab Svcs. 3[address] [facility]   Ph: [phone]    [facility] Lab Svcs. [address] Geeta Mcnallysravanthijulio cesar [address]   Ph: [phone] Lab Svcs. [address]  Ph: [phone] [facility] Lab Svcs. 3215 [facility]. [facility], [place] [address]   Ph: [phone]      [facility] Daubs Lab Svcs. 1801 [facility], [address] [facility] [address]   Ph: [phone] [facility] 121 [facility] Lab Svcs. [address] [place] [address]   Ph: [phone] [facility] Lab Svcs. 287 [facility], [address]   Ph: [phone] [address] [place] [address]. Lab Svcs. 211 [facility], [address] [facility] [address]   Ph: [address] [facility] Lab Svcs. [address] [place], [place] [address]   Ph: [phone] [address] [facility]. Lab Svcs.   54 [facility]   Ph: [phone]

## 2022-02-07 NOTE — PROGRESS NOTES
SUBJECTIVE:  Tavon Iniguez is a 42 y.o. female [address] [place]   Chief Complaint   Patient presents with    Hypertension    Other      PT HERE FOR EVAL     DYSLIPIDEMIA -  ? EXERCISE / DIET COMPLIANCE . PREVIOUS LABS D/W PT  HTN - TAKING MEDS . BP NOTED.  + DIET / EXERCISE COMPLIANCE.  NO HEADACHE , DIZZINESS No.   ASTHMA - DENIES WHEEZING, NO SOB, NO RECENT INHALER USE   VIT D DEF - TAKING MED .  LAB D/W PT  ALLERGIC RHINITIS -  OCC  NASAL CONGESTION , NO POSTNASAL DRAINAGE , NO SINUS PRESSURE, NO HA, OCC SNEEZING, NO WATERY ITCHY EYES.  JOINT PAINS - SHOULDER PAIN HOLLIS, RT KNEE  INTERMITTENT. DULL ACHE,  ? RAD, ? PAIN SCALE. DENIES TRAUMA. ? NO NUMBNESS / NO TINGLING  OBESITY - DIET / EXERCISE REVIEWED. WT NOTED  + FH DM - D/W PT       DENIES CP, No SOB, No PALPITATIONS, No COUGH, NO F/C  No ABD PAIN, No N/V, No DIARRHEA, No CONSTIPATION, No MELENA, No HEMATOCHEZIA. No DYSURIA, No FREQ, No URGENCY, No HEMATURIA      PMH: REVIEWED AND UPDATED TODAY    PSH: REVIEWED AND UPDATED TODAY    SOCIAL HX: REVIEWED AND UPDATED TODAY    FAMILY HX: REVIEWED AND UPDATED TODAY    ALLERGY:  Flector [diclofenac epolamine]    MEDS: REVIEWED  Prior to Visit Medications    Medication Sig Taking? Authorizing Provider   etonogestrel-ethinyl estradiol (NUVARING) 0.12-0.015 MG/24HR vaginal ring PLACE 1 RING VAGINALLY EVERY 28 DAYS. LEAVE IN PLACE FOR 3 WEEKS, THEN REMOVE FOR 1 WEEK.  Yes Lyla Mcmanus DO   telmisartan (MICARDIS) 20 MG tablet TAKE 1 TABLET BY MOUTH ONE TIME A DAY Yes Michael Reeves MD   NIFEdipine (ADALAT CC) 60 MG extended release tablet TAKE 1 TABLET BY MOUTH ONE TIME A DAY Yes Michael Reeves MD   Vitamin D, Cholecalciferol, 25 MCG (1000 UT) CAPS Take 1,000 Units by mouth daily Yes Michael Reeves MD   ibuprofen (ADVIL;MOTRIN) 800 MG tablet Take 1 tablet by mouth every 8 hours Yes Michael Reeves MD   albuterol sulfate  (90 Base) MCG/ACT inhaler Inhale 2 puffs into the lungs 4 times daily as needed for Wheezing or Shortness of Breath Yes Conor Rueda MD   Blood Pressure KIT 1 Device by Does not apply route 2 times daily Dx: J38 Yes Catherine Анна Casillasr, DO   cetirizine (ZYRTEC) 10 MG tablet Take 1 tablet by mouth daily as needed for Allergies. Yes Conor Rueda MD       ROS: COMPREHENSIVE ROS AS IN HX, REST -VE  History obtained from chart review and the patient       OBJECTIVE:   NURSING NOTE AND VITALS REVIEWED  /75 (Site: Left Upper Arm, Position: Sitting, Cuff Size: Medium Adult)   Pulse 83   Temp 97.6 °F (36.4 °C)   Ht 5' 1\" (1.549 m)   Wt 174 lb (78.9 kg)   SpO2 98%   Breastfeeding No   BMI 32.88 kg/m²     NO ACUTE DISTRESS    REPEAT BP: 120/80 (LT), NO ORTHOSTASIS     Body mass index is 32.88 kg/m². HEENT: NO PALLOR, ANICTERIC, PERRLA, EOMI, NO CONJUNCTIVAL ERYTHEMA,                 NO SINUS TENDERNESS  NECK:  SUPPLE, TRACHEA MIDLINE, NT, NO JVD, NO CB, NO LA, NO TM, NO STIFFNESS  CHEST: RESPY EFFORT NL, GOOD AE, NO W/R/C  HEART: S1S2+ REG, NO M/G/R  ABD: OBESE, SOFT, NT, NO HSM, BS+  EXT: NO EDEMA, NT, PULSES +. MILTON'S -VE  NEURO: ALERT AND ORIENTED X 3, NO MENINGEAL SIGNS, NO TREMORS, NL GAIT, NO FOCAL DEFICITS  PSYCH: FAIRLY GOOD AFFECT  BACK: NT, NO ROM, NO CVA TENDERNESS  SHOULDERS: NT, NO ROM  KNEES: NO SWELLING, NT, NO ROM     PREVIOUS LABS REVIEWED AND D/W PT    ACCUCHECK: 96    POC HBA1C: 5.6    ASSESSMENT / PLAN:     Diagnosis Orders   1. Dyslipidemia  COUNSELLED. ADVISED LOW FAT / CHOL DIET/ EXERCISE.  MONITOR. F/U LABS  GOALS D/W PT.  MAKE CHANGES AS NEEDED. 2. Primary hypertension  COUNSELLED. CONTINUE MEDS. LOW NA+ / DASH DIET/ EXERCISE. MONITOR. GOAL </= 130/80  F/U LABS  MAKE CHANGES AS NEEDED. 3. Mild intermittent asthma without complication  COUNSELLED. ALBUTEROL PRN. MONITOR. MONITOR FOR TRIGGERS- ENVIRONMENTAL MODIFICATION. MAKE CHANGES AS NEEDED. 4. Vitamin D deficiency  COUNSELLED. MONITOR ON VIT D SUPPLEMENT. MAKE CHANGES AS NEEDED. 5. Other allergic rhinitis  COUNSELLED. MED PRN. MONITOR  MAKE CHANGES AS NEEDED. 6. Multiple joint pain  COUNSELLED. ? OA. EXERCISES. ANALGESICS PRN. MONITOR. HOME EXERCISES  MAKE CHANGES AS NEEDED. 7. Obesity (BMI 30-39. 9)  COUNSELLED. DIET/ EXERCISE ADVISED. LIFESTYLE MODIFICATION. WT LOSS ADVISED. MONITOR AND MAKE CHANGES AS NEEDED. 8. Family history of diabetes mellitus (DM)  COUNSELLED. NEGATIVE FOR DM  ADVISED ON DIET / EXERCISE  ADVISED RISK FACTOR MODIFICATION. MONITOR. MAKE CHANGES AS NEEDED.                        MEDICATION SIDE EFFECTS D/W PATIENT    RETURN TO CLINIC WITHIN 4 MONTHS / PRN    FOLLOW UP FOR FASTING LABS

## 2022-02-08 NOTE — TELEPHONE ENCOUNTER
Please see pended medication. Patient is out. Please advise.      Routing to Dr. Jaison Leyva Finger call)

## 2022-02-09 NOTE — TELEPHONE ENCOUNTER
Patient calling and stated she is out of her [facility]. Please advise. Pended medication.     Last PAP 1/4/22    Routing to Dr. Adrienne Rahman

## 2022-02-10 RX ORDER — ETONOGESTREL AND ETHINYL ESTRADIOL 11.7; 2.7 MG/1; MG/1
INSERT, EXTENDED RELEASE VAGINAL
Qty: 3 EACH | Refills: 3 | Status: SHIPPED | OUTPATIENT
Start: 2022-02-10

## 2022-05-19 DIAGNOSIS — I10 PRIMARY HYPERTENSION: ICD-10-CM

## 2022-05-19 DIAGNOSIS — M25.512 ACUTE PAIN OF BOTH SHOULDERS: ICD-10-CM

## 2022-05-19 DIAGNOSIS — M25.561 ACUTE PAIN OF RIGHT KNEE: ICD-10-CM

## 2022-05-19 DIAGNOSIS — M25.511 ACUTE PAIN OF BOTH SHOULDERS: ICD-10-CM

## 2022-05-23 RX ORDER — IBUPROFEN 800 MG/1
800 TABLET ORAL EVERY 8 HOURS
Qty: 60 TABLET | Refills: 0 | Status: SHIPPED | OUTPATIENT
Start: 2022-05-23

## 2022-05-23 RX ORDER — TELMISARTAN 20 MG/1
TABLET ORAL
Qty: 90 TABLET | Refills: 0 | Status: SHIPPED | OUTPATIENT
Start: 2022-05-23 | End: 2022-06-13 | Stop reason: SDUPTHER

## 2022-05-23 RX ORDER — NIFEDIPINE 60 MG/1
TABLET, FILM COATED, EXTENDED RELEASE ORAL
Qty: 90 TABLET | Refills: 0 | Status: SHIPPED | OUTPATIENT
Start: 2022-05-23 | End: 2022-06-13 | Stop reason: SDUPTHER

## 2022-06-09 DIAGNOSIS — I10 PRIMARY HYPERTENSION: ICD-10-CM

## 2022-06-09 DIAGNOSIS — E55.9 VITAMIN D DEFICIENCY: ICD-10-CM

## 2022-06-09 DIAGNOSIS — E78.5 DYSLIPIDEMIA: ICD-10-CM

## 2022-06-10 LAB
A/G RATIO: 1.6 (ref 1.1–2.2)
ALBUMIN SERPL-MCNC: 4.6 G/DL (ref 3.4–5)
ALP BLD-CCNC: 44 U/L (ref 40–129)
ALT SERPL-CCNC: 11 U/L (ref 10–40)
ANION GAP SERPL CALCULATED.3IONS-SCNC: 15 MMOL/L (ref 3–16)
AST SERPL-CCNC: 14 U/L (ref 15–37)
BILIRUB SERPL-MCNC: 0.3 MG/DL (ref 0–1)
BUN BLDV-MCNC: 10 MG/DL (ref 7–20)
CALCIUM SERPL-MCNC: 9.9 MG/DL (ref 8.3–10.6)
CHLORIDE BLD-SCNC: 107 MMOL/L (ref 99–110)
CHOLESTEROL, TOTAL: 228 MG/DL (ref 0–199)
CO2: 19 MMOL/L (ref 21–32)
CREAT SERPL-MCNC: 0.7 MG/DL (ref 0.6–1.1)
GFR AFRICAN AMERICAN: >60
GFR NON-AFRICAN AMERICAN: >60
GLUCOSE BLD-MCNC: 93 MG/DL (ref 70–99)
HDLC SERPL-MCNC: 58 MG/DL (ref 40–60)
LDL CHOLESTEROL CALCULATED: 155 MG/DL
POTASSIUM SERPL-SCNC: 4.1 MMOL/L (ref 3.5–5.1)
SODIUM BLD-SCNC: 141 MMOL/L (ref 136–145)
TOTAL PROTEIN: 7.4 G/DL (ref 6.4–8.2)
TRIGL SERPL-MCNC: 74 MG/DL (ref 0–150)
VITAMIN D 25-HYDROXY: 31.6 NG/ML
VLDLC SERPL CALC-MCNC: 15 MG/DL

## 2022-06-13 ENCOUNTER — OFFICE VISIT (OUTPATIENT)
Dept: INTERNAL MEDICINE CLINIC | Age: 42
End: 2022-06-13
Payer: COMMERCIAL

## 2022-06-13 VITALS
WEIGHT: 177.4 LBS | SYSTOLIC BLOOD PRESSURE: 120 MMHG | DIASTOLIC BLOOD PRESSURE: 80 MMHG | BODY MASS INDEX: 33.49 KG/M2 | TEMPERATURE: 97.4 F | OXYGEN SATURATION: 98 % | HEART RATE: 94 BPM | HEIGHT: 61 IN

## 2022-06-13 DIAGNOSIS — J35.1 TONSILLAR ENLARGEMENT: ICD-10-CM

## 2022-06-13 DIAGNOSIS — E66.9 OBESITY (BMI 30-39.9): ICD-10-CM

## 2022-06-13 DIAGNOSIS — J02.9 SORE THROAT: ICD-10-CM

## 2022-06-13 DIAGNOSIS — Z23 NEED FOR DIPHTHERIA-TETANUS-PERTUSSIS (TDAP) VACCINE: ICD-10-CM

## 2022-06-13 DIAGNOSIS — J30.89 OTHER ALLERGIC RHINITIS: ICD-10-CM

## 2022-06-13 DIAGNOSIS — J45.20 MILD INTERMITTENT ASTHMA WITHOUT COMPLICATION: ICD-10-CM

## 2022-06-13 DIAGNOSIS — E55.9 VITAMIN D DEFICIENCY: ICD-10-CM

## 2022-06-13 DIAGNOSIS — I10 PRIMARY HYPERTENSION: Primary | ICD-10-CM

## 2022-06-13 DIAGNOSIS — E78.2 MIXED HYPERLIPIDEMIA: ICD-10-CM

## 2022-06-13 PROCEDURE — 99214 OFFICE O/P EST MOD 30 MIN: CPT | Performed by: INTERNAL MEDICINE

## 2022-06-13 PROCEDURE — 90715 TDAP VACCINE 7 YRS/> IM: CPT | Performed by: INTERNAL MEDICINE

## 2022-06-13 PROCEDURE — 90471 IMMUNIZATION ADMIN: CPT | Performed by: INTERNAL MEDICINE

## 2022-06-13 RX ORDER — TELMISARTAN 20 MG/1
TABLET ORAL
Qty: 90 TABLET | Refills: 0 | Status: SHIPPED | OUTPATIENT
Start: 2022-06-13 | End: 2022-09-07 | Stop reason: SDUPTHER

## 2022-06-13 RX ORDER — NIFEDIPINE 60 MG/1
TABLET, FILM COATED, EXTENDED RELEASE ORAL
Qty: 90 TABLET | Refills: 0 | Status: SHIPPED | OUTPATIENT
Start: 2022-06-13 | End: 2022-09-07 | Stop reason: SDUPTHER

## 2022-06-13 RX ORDER — FAMOTIDINE 20 MG
1 TABLET ORAL DAILY
Qty: 90 CAPSULE | Refills: 0 | Status: SHIPPED | OUTPATIENT
Start: 2022-06-13 | End: 2022-10-19 | Stop reason: SDUPTHER

## 2022-06-13 ASSESSMENT — ANXIETY QUESTIONNAIRES
2. NOT BEING ABLE TO STOP OR CONTROL WORRYING: 0
GAD7 TOTAL SCORE: 0
1. FEELING NERVOUS, ANXIOUS, OR ON EDGE: 0
7. FEELING AFRAID AS IF SOMETHING AWFUL MIGHT HAPPEN: 0
4. TROUBLE RELAXING: 0
6. BECOMING EASILY ANNOYED OR IRRITABLE: 0
5. BEING SO RESTLESS THAT IT IS HARD TO SIT STILL: 0
3. WORRYING TOO MUCH ABOUT DIFFERENT THINGS: 0
IF YOU CHECKED OFF ANY PROBLEMS ON THIS QUESTIONNAIRE, HOW DIFFICULT HAVE THESE PROBLEMS MADE IT FOR YOU TO DO YOUR WORK, TAKE CARE OF THINGS AT HOME, OR GET ALONG WITH OTHER PEOPLE: NOT DIFFICULT AT ALL

## 2022-06-13 ASSESSMENT — PATIENT HEALTH QUESTIONNAIRE - PHQ9
SUM OF ALL RESPONSES TO PHQ9 QUESTIONS 1 & 2: 0
SUM OF ALL RESPONSES TO PHQ QUESTIONS 1-9: 0
1. LITTLE INTEREST OR PLEASURE IN DOING THINGS: 0
2. FEELING DOWN, DEPRESSED OR HOPELESS: 0
SUM OF ALL RESPONSES TO PHQ QUESTIONS 1-9: 0

## 2022-06-13 NOTE — PATIENT INSTRUCTIONS
TAKE MED AS ADVISED    DIET/ EXERCISE. FOLLOW UP WITHIN 3-4 MONTHS  / [address] [place] [address] Rd Laboratory Locations - No appointment necessary. @ indicates the location is open Saturdays in addition to Monday through Friday. Call your preferred location for test preparation, business hours and other information you need. [employer] accepts [employer]. [facility]    @ [place] Lab Svcs. [address] 9[address] [facility], [address]   Ph: [phone] [facility] Lab Svcs. [address]   Ph: [phone]  @ [place] [facility] Svcs. 3[address] [facility]   Ph: [phone]    [facility] Lab Svcs. 2001 [facility] [address]   Ph: [phone] @ [place] [facility] Svcs. [address]  Ph: [phone] @ [place] [facility] Lab Svcs. [address] [facility] [address] [place] [address], Luis Felipe [facility] [address]   Ph: [phone]     [facility] Svcs. [place] [address] [place] [address], Christine Ames 19  Ph: [phone]    [place]   @ [facility] Lab Svcs. [address] [facility] [place] [address]   Ph: [phone] [facility] Bld[facility]. [address], [address] [place] [address]  Ph: [address] [place] [address]   [facility] [address] Lab Svcs. 54 [facility]   Ph: [phone] [facility]. Lab Svcs.   211 [facility], [address] [facility]   Ph: [phone]

## 2022-06-13 NOTE — PROGRESS NOTES
SUBJECTIVE:  Vanessa Sanchez is a 36 y.o. female [address] [place]   Chief Complaint   Patient presents with    Hypertension    Other        PT HERE FOR EVAL     HTN - TAKING MEDS . BP NOTED.  + DIET / EXERCISE COMPLIANCE.  NO HEADACHE , DIZZINESS No.   DYSLIPIDEMIA -  ? EXERCISE / DIET COMPLIANCE . PREVIOUS LABS D/W PT  ASTHMA - DENIES WHEEZING, NO SOB, NO RECENT INHALER USE   VIT D DEF - TAKING MED .  LAB D/W PT  ALLERGIC RHINITIS -  +  NASAL CONGESTION , NO POSTNASAL DRAINAGE , NO SINUS PRESSURE, NO HA, ? SNEEZING, NO WATERY ITCHY EYES.  C/O SORE THROAT - RECURRENT PAST FEW MONTHS. OCC ODYNOPHAGIA. NO HEARTBURN. LAST EPISODE 3 WEEKS. NO SICK CONTACTS, NO HOARSENESS  OBESITY - DIET / EXERCISE REVIEWED. WT NOTED  NEEDS TDAP      DENIES CP, No SOB, No PALPITATIONS, No COUGH, NO F/C  No ABD PAIN, No N/V, No DIARRHEA, No CONSTIPATION, No MELENA, No HEMATOCHEZIA. No DYSURIA, No FREQ, No URGENCY, No HEMATURIA      PMH: REVIEWED AND UPDATED TODAY    PSH: REVIEWED AND UPDATED TODAY    SOCIAL HX: REVIEWED AND UPDATED TODAY    FAMILY HX: REVIEWED AND UPDATED TODAY    ALLERGY:  Flector [diclofenac epolamine]    MEDS: REVIEWED  Prior to Visit Medications    Medication Sig Taking? Authorizing Provider   ibuprofen (ADVIL;MOTRIN) 800 MG tablet Take 1 tablet by mouth every 8 hours Yes Kimmie Ontiveros MD   telmisartan (MICARDIS) 20 MG tablet TAKE 1 TABLET BY MOUTH ONE TIME A DAY Yes Kimmie Ontiveros MD   NIFEdipine (ADALAT CC) 60 MG extended release tablet TAKE 1 TABLET BY MOUTH ONE TIME A DAY Yes Kimmie Ontiveros MD   etonogestrel-ethinyl estradiol (NUVARING) 0.12-0.015 MG/24HR vaginal ring PLACE 1 RING VAGINALLY EVERY 28 DAYS. LEAVE IN PLACE FOR 3 WEEKS, THEN REMOVE FOR 1 WEEK.  Yes Wyatt Mcmanus,    Vitamin D, Cholecalciferol, 25 MCG (1000 UT) CAPS Take 1,000 Units by mouth daily Yes Kimmie Ontiveros MD   albuterol sulfate  (90 Base) MCG/ACT inhaler Inhale 2 puffs into the lungs 4 times daily as needed for Wheezing or Shortness of Breath Yes Torito Castillo MD   Blood Pressure KIT 1 Device by Does not apply route 2 times daily Dx: H44 Yes Candance Easter Federer,    cetirizine (ZYRTEC) 10 MG tablet Take 1 tablet by mouth daily as needed for Allergies. Yes Torito Castillo MD       ROS: COMPREHENSIVE ROS AS IN HX, REST -VE  History obtained from chart review and the patient       OBJECTIVE:   NURSING NOTE AND VITALS REVIEWED  /66 (Site: Right Upper Arm, Position: Sitting, Cuff Size: Medium Adult)   Pulse 94   Temp 97.4 °F (36.3 °C)   Ht 5' 1\" (1.549 m)   Wt 177 lb 6.4 oz (80.5 kg)   SpO2 98%   Breastfeeding No   BMI 33.52 kg/m²     NO ACUTE DISTRESS    REPEAT BP: 120/80 (RT), NO ORTHOSTASIS     Body mass index is 33.52 kg/m². HEENT: NO PALLOR, ANICTERIC, PERRLA, EOMI, NO CONJUNCTIVAL ERYTHEMA,                 NO SINUS TENDERNESS, HOLLIS TONSILLAR ENLARGEMENT, NO ERYTHEMA, NO EXUDATES, NO TM ERYTHEMA  NECK:  SUPPLE, TRACHEA MIDLINE, NT, NO JVD, NO CB, NO LA, NO TM, NO STIFFNESS  CHEST: RESPY EFFORT NL, GOOD AE, NO W/R/C, NT  HEART: S1S2+ REG, NO M/G/R  ABD: OBESE, SOFT, NT, NO HSM, BS+  EXT: NO EDEMA, NT, PULSES +. MILTON'S -VE  NEURO: ALERT AND ORIENTED X 3, NO MENINGEAL SIGNS, NO TREMORS, NL GAIT, NO FOCAL DEFICITS  PSYCH: FAIRLY GOOD AFFECT  BACK: NT, NO ROM, NO CVA TENDERNESS     PREVIOUS LABS REVIEWED AND D/W PT    ASSESSMENT / PLAN:     Diagnosis Orders   1. Primary hypertension  COUNSELLED. STABLE. CONTINUE MEDS. LOW NA+ / DASH DIET/ EXERCISE. MONITOR. GOAL </= 130/80  MAKE CHANGES AS NEEDED. 2. Mixed hyperlipidemia  COUNSELLED. ADVISED LOW FAT / CHOL DIET/ EXERCISE.  MONITOR. GOALS D/W PT.  MAKE CHANGES AS NEEDED. 3. Mild intermittent asthma without complication  COUNSELLED. STABLE. ALBUTEROL PRN. MONITOR. MONITOR FOR TRIGGERS- ENVIRONMENTAL MODIFICATION. MAKE CHANGES AS NEEDED. 4. Vitamin D deficiency  COUNSELLED. MONITOR ON VIT D SUPPLEMENT. MAKE CHANGES AS NEEDED.        5. Other allergic rhinitis  COUNSELLED. MED PRN. SYMPTOMATIC RX. MONITOR  MAKE CHANGES AS NEEDED. 6. Sore throat  COUNSELLED. ADVISED SYMPTOMATIC RX. WARM SALT WATER GAGGLES. THROAT LOZENGES PRN. SEE BELOW. MONITOR  MAKE CHANGES AS NEEDED. 7. Tonsillar enlargement  COUNSELLED. PT DEFERRED ENT REFERRAL. MONITOR  MAKE CHANGES AS NEEDED. 8. Obesity (BMI 30-39. 9)  COUNSELLED. DIET/ EXERCISE ADVISED. LIFESTYLE MODIFICATION. WT LOSS ADVISED. MONITOR AND MAKE CHANGES AS NEEDED. 9. Need for diphtheria-tetanus-pertussis (Tdap) vaccine  COUNSELLED. S/E D/W PT. TDAP GIVEN. PT TOLERATED.                          MEDICATION SIDE EFFECTS D/W PATIENT    RETURN TO CLINIC WITHIN 3-4 MONTHS  / PRN

## 2022-09-07 DIAGNOSIS — I10 PRIMARY HYPERTENSION: ICD-10-CM

## 2022-09-07 RX ORDER — NIFEDIPINE 60 MG/1
TABLET, FILM COATED, EXTENDED RELEASE ORAL
Qty: 90 TABLET | Refills: 0 | Status: SHIPPED | OUTPATIENT
Start: 2022-09-07 | End: 2022-10-19 | Stop reason: SDUPTHER

## 2022-09-07 RX ORDER — TELMISARTAN 20 MG/1
TABLET ORAL
Qty: 90 TABLET | Refills: 0 | Status: SHIPPED | OUTPATIENT
Start: 2022-09-07 | End: 2022-10-19 | Stop reason: SDUPTHER

## 2022-10-19 ENCOUNTER — OFFICE VISIT (OUTPATIENT)
Dept: INTERNAL MEDICINE CLINIC | Age: 42
End: 2022-10-19
Payer: COMMERCIAL

## 2022-10-19 VITALS
DIASTOLIC BLOOD PRESSURE: 80 MMHG | SYSTOLIC BLOOD PRESSURE: 120 MMHG | OXYGEN SATURATION: 99 % | HEART RATE: 93 BPM | WEIGHT: 175 LBS | BODY MASS INDEX: 33.07 KG/M2 | TEMPERATURE: 98.8 F

## 2022-10-19 DIAGNOSIS — R19.7 DIARRHEA, UNSPECIFIED TYPE: ICD-10-CM

## 2022-10-19 DIAGNOSIS — J30.89 OTHER ALLERGIC RHINITIS: ICD-10-CM

## 2022-10-19 DIAGNOSIS — R53.83 OTHER FATIGUE: ICD-10-CM

## 2022-10-19 DIAGNOSIS — E55.9 VITAMIN D DEFICIENCY: ICD-10-CM

## 2022-10-19 DIAGNOSIS — J45.20 MILD INTERMITTENT ASTHMA WITHOUT COMPLICATION: ICD-10-CM

## 2022-10-19 DIAGNOSIS — R10.9 ABDOMINAL CRAMPING: Primary | ICD-10-CM

## 2022-10-19 DIAGNOSIS — E78.2 MIXED HYPERLIPIDEMIA: ICD-10-CM

## 2022-10-19 DIAGNOSIS — I10 PRIMARY HYPERTENSION: ICD-10-CM

## 2022-10-19 LAB
BILIRUBIN, POC: NEGATIVE
BLOOD URINE, POC: NEGATIVE
CLARITY, POC: CLEAR
COLOR, POC: YELLOW
CONTROL: NORMAL
GLUCOSE URINE, POC: NEGATIVE
KETONES, POC: NEGATIVE
LEUKOCYTE EST, POC: NEGATIVE
NITRITE, POC: NEGATIVE
PH, POC: 8.5
PREGNANCY TEST URINE, POC: NEGATIVE
PROTEIN, POC: NEGATIVE
SPECIFIC GRAVITY, POC: 1.02
UROBILINOGEN, POC: 0.2

## 2022-10-19 PROCEDURE — 81002 URINALYSIS NONAUTO W/O SCOPE: CPT | Performed by: INTERNAL MEDICINE

## 2022-10-19 PROCEDURE — 99214 OFFICE O/P EST MOD 30 MIN: CPT | Performed by: INTERNAL MEDICINE

## 2022-10-19 PROCEDURE — 81025 URINE PREGNANCY TEST: CPT | Performed by: INTERNAL MEDICINE

## 2022-10-19 RX ORDER — TELMISARTAN 20 MG/1
TABLET ORAL
Qty: 90 TABLET | Refills: 0 | Status: SHIPPED | OUTPATIENT
Start: 2022-10-19

## 2022-10-19 RX ORDER — DICYCLOMINE HYDROCHLORIDE 10 MG/1
10 CAPSULE ORAL
Qty: 60 CAPSULE | Refills: 0 | Status: SHIPPED | OUTPATIENT
Start: 2022-10-19

## 2022-10-19 RX ORDER — FAMOTIDINE 20 MG
1 TABLET ORAL DAILY
Qty: 90 CAPSULE | Refills: 0 | Status: SHIPPED | OUTPATIENT
Start: 2022-10-19

## 2022-10-19 RX ORDER — NIFEDIPINE 60 MG/1
TABLET, FILM COATED, EXTENDED RELEASE ORAL
Qty: 90 TABLET | Refills: 0 | Status: SHIPPED | OUTPATIENT
Start: 2022-10-19

## 2022-10-19 SDOH — ECONOMIC STABILITY: FOOD INSECURITY: WITHIN THE PAST 12 MONTHS, YOU WORRIED THAT YOUR FOOD WOULD RUN OUT BEFORE YOU GOT MONEY TO BUY MORE.: NEVER TRUE

## 2022-10-19 SDOH — ECONOMIC STABILITY: FOOD INSECURITY: WITHIN THE PAST 12 MONTHS, THE FOOD YOU BOUGHT JUST DIDN'T LAST AND YOU DIDN'T HAVE MONEY TO GET MORE.: NEVER TRUE

## 2022-10-19 ASSESSMENT — SOCIAL DETERMINANTS OF HEALTH (SDOH): HOW HARD IS IT FOR YOU TO PAY FOR THE VERY BASICS LIKE FOOD, HOUSING, MEDICAL CARE, AND HEATING?: NOT HARD AT ALL

## 2022-10-19 NOTE — PATIENT INSTRUCTIONS
TAKE MED AS ADVISED    DIET/ EXERCISE. FOLLOW UP WITHIN 4 WEEKS / AS NEEDED    FOLLOW UP FOR  5 [facility] Laboratory Locations - No appointment necessary. @ indicates the location is open Saturdays in addition to Monday through Friday. Call your preferred location for test preparation, business hours and other information you need. [employer] accepts [employer]. [facility]    @ [place] [facility] 3 [facility] [address] [place] [address]. [place], [address]   Ph: [phone] [facility] Svcs. [address]   Ph: [phone]  @ Gonz[facility] Goldmann Lab Svcs. 3155 [facility]   [facility]   Ph: [phone]    [facility] Lab Svcs. [address]   Ph: [phone] @ [place] [facility]. [address]  Ph: [phone] @ Preethi [facility] Lab Svcs. [address] [facility] [address] [facility] [address]. Luis Felipe Jose [facility] [address]   Ph: [phone]     Amado Levi Svcs. [address]  Ph: [phone]    [place]   @ [place] [facility] Svcs. [address] [place], [place] [address]   Ph: [phone] [facility] Med. Office Bldg. [address] [facility], [address] [place] [address]  Ph: [address]:  [address] Lab Svcs. 54 [facility]   Ph: [phone] [facility]. Lab Svcs.   211 [facility], [facility]   Ph: [phone]

## 2022-10-19 NOTE — LETTER
[facility] Internal Medicine  [address] [place] [address] Drive  [address] [facility]  Phone: [phone]  Fax: [phone]    Ramo Hutchinson MD        October 19, 2022     Patient: Treasure Croft   YOB: 1980   Date of Visit: 10/19/2022       To Whom it May Concern:    Treasure Croft was seen in my clinic on 10/19/2022. She may return to work on 10/21/22. If you have any questions or concerns, please don't hesitate to call.     Sincerely,         Ramo Hutchinson MD

## 2022-10-19 NOTE — PROGRESS NOTES
SUBJECTIVE:  Nraen Ibanez is a 36 y.o. female [address] [place]   Chief Complaint   Patient presents with    Follow-up     STOMACH PROBLEMS DIARRHEA SOB WEAKNESS HEADACHE SINCE Monday. TOOK HOME COVID TEST WAS NEGATIVE. PT HERE FOR EVAL     C/O ABD CRAMPING - PAST 2+ DAYS. MOSTLY LOWER ABD. + NAUSEA, NO VOMITING, + DIARRHEA, No CONSTIPATION, No MELENA, No HEMATOCHEZIA. NO SICK CONTACTS. STATES ATE OUT DAYS AGO, NO RECENT TRAVELS  C/O DIARRHEA  PAST 2 DAYS. 6-8 BM/ DAY. LOOSE WATERY, NON BLOODY, ? MUCUS. NO SICK CONTACTS  HTN - TAKING MEDS . BP NOTED.  + DIET / EXERCISE COMPLIANCE.  + HEADACHE , DIZZINESS No.   HLP -  ? EXERCISE / DIET COMPLIANCE . PREVIOUS LABS D/W PT  ASTHMA - DENIES WHEEZING, NO SOB, NO RECENT INHALER USE   VIT D DEF - TAKING MED .  LAB D/W PT  ALLERGIC RHINITIS -  +  NASAL CONGESTION , NO POSTNASAL DRAINAGE , NO SINUS PRESSURE, + HA, + SNEEZING, NO WATERY ITCHY EYES.  C/O FATIGUE - ? PAST FEW DAYS. ? COLD / ? HEAT INTOLERANCE     DENIES CP, No SOB, No PALPITATIONS, No COUGH, NO F/C  No DYSURIA, ? No FREQ, No URGENCY, No HEMATURIA    PMH: REVIEWED AND UPDATED TODAY    PSH: REVIEWED AND UPDATED TODAY    SOCIAL HX: REVIEWED AND UPDATED TODAY    FAMILY HX: REVIEWED AND UPDATED TODAY    ALLERGY:  Flector [diclofenac epolamine]    MEDS: REVIEWED  Prior to Visit Medications    Medication Sig Taking? Authorizing Provider   telmisartan (MICARDIS) 20 MG tablet TAKE 1 TABLET BY MOUTH ONE TIME A DAY Yes Blayne Sharp MD   NIFEdipine (ADALAT CC) 60 MG extended release tablet TAKE 1 TABLET BY MOUTH ONE TIME A DAY Yes Blayne Sharp MD   Vitamin D, Cholecalciferol, 25 MCG (1000 UT) CAPS Take 1,000 Units by mouth daily Yes Blayne Sharp MD   ibuprofen (ADVIL;MOTRIN) 800 MG tablet Take 1 tablet by mouth every 8 hours Yes Blayne Sharp MD   etonogestrel-ethinyl estradiol (NUVARING) 0.12-0.015 MG/24HR vaginal ring PLACE 1 RING VAGINALLY EVERY 28 DAYS. LEAVE IN PLACE FOR 3 WEEKS, THEN REMOVE FOR 1 WEEK. Yes Fabian Casillasr, DO   albuterol sulfate  (90 Base) MCG/ACT inhaler Inhale 2 puffs into the lungs 4 times daily as needed for Wheezing or Shortness of Breath Yes Teddy Marmolejo MD   Blood Pressure KIT 1 Device by Does not apply route 2 times daily Dx: D22 Yes Fabian Casillasr, DO   cetirizine (ZYRTEC) 10 MG tablet Take 1 tablet by mouth daily as needed for Allergies. Yes Teddy Marmolejo MD       ROS: COMPREHENSIVE ROS AS IN HX, REST -VE  History obtained from chart review and the patient       OBJECTIVE:   NURSING NOTE AND VITALS REVIEWED  /86 (Site: Left Upper Arm, Position: Sitting, Cuff Size: Large Adult)   Pulse 93   Wt 175 lb (79.4 kg)   SpO2 99%   BMI 33.07 kg/m²     NO ACUTE DISTRESS    REPEAT BP:  120/80 (LT), NO ORTHOSTASIS     TEMP: 98.8F    Body mass index is 33.07 kg/m². HEENT: NO PALLOR, ANICTERIC, PERRLA, EOMI, NO CONJUNCTIVAL ERYTHEMA,                 NO SINUS TENDERNESS. ORAL MUCOSA MOIST  NECK:  SUPPLE, TRACHEA MIDLINE, NT, NO JVD, NO CB, NO LA, NO TM, NO STIFFNESS  CHEST: RESPY EFFORT NL, GOOD AE, NO W/R/C  HEART: S1S2+ REG, NO M/G/R  ABD: OBESE, SOFT, NO LOCALIZED TENDERNESS, NO GUARDING, NO RIGIDITY, NO HSM, BS+  EXT: NO EDEMA, NT, PULSES +. MILTON'S -VE  NEURO: ALERT AND ORIENTED X 3, NO MENINGEAL SIGNS, NO TREMORS, NL GAIT, NO FOCAL DEFICITS  PSYCH: FAIRLY GOOD AFFECT  BACK: NT, NO ROM, NO CVA TENDERNESS     PREVIOUS LABS REVIEWED AND D/W PT    COVID TEST: NEGATIVE    UA: NEGATIVE FOR UTI    PREG TEST: NEGATIVE      ASSESSMENT / PLAN:     Diagnosis Orders   1. Abdominal cramping  COUNSELLED. NO ACUTE FINDINGS ON EXAM  SYMPTOMATIC RX. BENTYL PRN  F/U LABS  UA NEGATIVE FOR  UTI  MAKE CHANGES AS NEEDED. 2. Diarrhea, unspecified type  COUNSELLED. SYMPTOMATIC RX. ADVISED MAINTAIN HYDRATION. STOOL STUDIES TO EVAL - INCLUDING C .DIFF  F/U LABS  MAKE CHANGES AS NEEDED. 3. Primary hypertension  COUNSELLED. CONTINUE MEDS. LOW NA+ / DASH DIET/ EXERCISE. MONITOR. GOAL </= 130/80   F/U LABS  MAKE CHANGES AS NEEDED. 4. Mixed hyperlipidemia  COUNSELLED. DEFERRED MED  ADVISED LOW FAT / CHOL DIET/ EXERCISE.  MONITOR. GOALS D/W PT.  MAKE CHANGES AS NEEDED. 5. Mild intermittent asthma without complication  COUNSELLED. ALBUTEROL PRN. MONITOR. MONITOR FOR TRIGGERS- ENVIRONMENTAL MODIFICATION. MAKE CHANGES AS NEEDED. 6. Vitamin D deficiency  COUNSELLED. MONITOR ON VIT D SUPPLEMENT. MAKE CHANGES AS NEEDED. 7. Other allergic rhinitis  COUNSELLED. MED PRN. MONITOR  MAKE CHANGES AS NEEDED. 8. Other fatigue  COUNSELLED. LABS TO EVAL. R/O ANEMIA,  R/O THYROID D/O.  LIFESTYLE MODIFICATION. MONITOR. MAKE CHANGES AS NEEDED.              NOTE GIVEN FOR WORK            MEDICATION SIDE EFFECTS D/W PATIENT    RETURN TO CLINIC WITHIN 4 WEEKS / PRN    FOLLOW UP FOR  LABS

## 2022-10-21 DIAGNOSIS — R19.7 DIARRHEA, UNSPECIFIED TYPE: ICD-10-CM

## 2022-10-21 DIAGNOSIS — R53.83 OTHER FATIGUE: ICD-10-CM

## 2022-10-21 DIAGNOSIS — I10 PRIMARY HYPERTENSION: ICD-10-CM

## 2022-10-21 DIAGNOSIS — R10.9 ABDOMINAL CRAMPING: ICD-10-CM

## 2022-10-21 DIAGNOSIS — E78.2 MIXED HYPERLIPIDEMIA: ICD-10-CM

## 2022-10-21 LAB
BASOPHILS ABSOLUTE: 0 K/UL (ref 0–0.2)
BASOPHILS RELATIVE PERCENT: 0.6 %
EOSINOPHILS ABSOLUTE: 0.1 K/UL (ref 0–0.6)
EOSINOPHILS RELATIVE PERCENT: 1.5 %
HCT VFR BLD CALC: 34.7 % (ref 36–48)
HEMOGLOBIN: 12.2 G/DL (ref 12–16)
LYMPHOCYTES ABSOLUTE: 3.1 K/UL (ref 1–5.1)
LYMPHOCYTES RELATIVE PERCENT: 42.8 %
MCH RBC QN AUTO: 32.2 PG (ref 26–34)
MCHC RBC AUTO-ENTMCNC: 35.1 G/DL (ref 31–36)
MCV RBC AUTO: 91.8 FL (ref 80–100)
MONOCYTES ABSOLUTE: 0.5 K/UL (ref 0–1.3)
MONOCYTES RELATIVE PERCENT: 6.8 %
NEUTROPHILS ABSOLUTE: 3.5 K/UL (ref 1.7–7.7)
NEUTROPHILS RELATIVE PERCENT: 48.3 %
PDW BLD-RTO: 12.8 % (ref 12.4–15.4)
PLATELET # BLD: 409 K/UL (ref 135–450)
PMV BLD AUTO: 7.4 FL (ref 5–10.5)
RBC # BLD: 3.78 M/UL (ref 4–5.2)
WBC # BLD: 7.3 K/UL (ref 4–11)

## 2022-10-22 LAB
A/G RATIO: 1.6 (ref 1.1–2.2)
ALBUMIN SERPL-MCNC: 4.3 G/DL (ref 3.4–5)
ALP BLD-CCNC: 50 U/L (ref 40–129)
ALT SERPL-CCNC: 16 U/L (ref 10–40)
ANION GAP SERPL CALCULATED.3IONS-SCNC: 14 MMOL/L (ref 3–16)
AST SERPL-CCNC: 13 U/L (ref 15–37)
BILIRUB SERPL-MCNC: <0.2 MG/DL (ref 0–1)
BUN BLDV-MCNC: 9 MG/DL (ref 7–20)
CALCIUM SERPL-MCNC: 10 MG/DL (ref 8.3–10.6)
CHLORIDE BLD-SCNC: 108 MMOL/L (ref 99–110)
CO2: 21 MMOL/L (ref 21–32)
CREAT SERPL-MCNC: 1 MG/DL (ref 0.6–1.1)
FOLATE: 15.22 NG/ML (ref 4.78–24.2)
GFR SERPL CREATININE-BSD FRML MDRD: >60 ML/MIN/{1.73_M2}
GLUCOSE BLD-MCNC: 104 MG/DL (ref 70–99)
LIPASE: 31 U/L (ref 13–60)
POTASSIUM SERPL-SCNC: 3.7 MMOL/L (ref 3.5–5.1)
SODIUM BLD-SCNC: 143 MMOL/L (ref 136–145)
TOTAL PROTEIN: 7 G/DL (ref 6.4–8.2)
TSH REFLEX: 0.47 UIU/ML (ref 0.27–4.2)
VITAMIN B-12: 552 PG/ML (ref 211–911)

## 2023-01-05 ENCOUNTER — OFFICE VISIT (OUTPATIENT)
Dept: INTERNAL MEDICINE CLINIC | Age: 43
End: 2023-01-05
Payer: COMMERCIAL

## 2023-01-05 VITALS
BODY MASS INDEX: 30.4 KG/M2 | WEIGHT: 165.2 LBS | TEMPERATURE: 98.9 F | DIASTOLIC BLOOD PRESSURE: 78 MMHG | HEIGHT: 62 IN | OXYGEN SATURATION: 99 % | SYSTOLIC BLOOD PRESSURE: 120 MMHG | HEART RATE: 90 BPM

## 2023-01-05 DIAGNOSIS — J45.20 MILD INTERMITTENT ASTHMA WITHOUT COMPLICATION: ICD-10-CM

## 2023-01-05 DIAGNOSIS — E78.2 MIXED HYPERLIPIDEMIA: ICD-10-CM

## 2023-01-05 DIAGNOSIS — J30.89 OTHER ALLERGIC RHINITIS: ICD-10-CM

## 2023-01-05 DIAGNOSIS — R10.9 ABDOMINAL CRAMPING: ICD-10-CM

## 2023-01-05 DIAGNOSIS — E55.9 VITAMIN D DEFICIENCY: ICD-10-CM

## 2023-01-05 DIAGNOSIS — R19.7 FREQUENT DIARRHEA: Primary | ICD-10-CM

## 2023-01-05 DIAGNOSIS — I10 PRIMARY HYPERTENSION: ICD-10-CM

## 2023-01-05 LAB
BILIRUBIN, POC: NEGATIVE
BLOOD URINE, POC: NEGATIVE
CLARITY, POC: CLEAR
COLOR, POC: YELLOW
CONTROL: NORMAL
GLUCOSE URINE, POC: NEGATIVE
KETONES, POC: NEGATIVE
LEUKOCYTE EST, POC: NEGATIVE
NITRITE, POC: NEGATIVE
PH, POC: 6.5
PREGNANCY TEST URINE, POC: NEGATIVE
PROTEIN, POC: 30
SPECIFIC GRAVITY, POC: 1.03
UROBILINOGEN, POC: 0.2

## 2023-01-05 PROCEDURE — 3078F DIAST BP <80 MM HG: CPT | Performed by: INTERNAL MEDICINE

## 2023-01-05 PROCEDURE — 81025 URINE PREGNANCY TEST: CPT | Performed by: INTERNAL MEDICINE

## 2023-01-05 PROCEDURE — 81002 URINALYSIS NONAUTO W/O SCOPE: CPT | Performed by: INTERNAL MEDICINE

## 2023-01-05 PROCEDURE — 3074F SYST BP LT 130 MM HG: CPT | Performed by: INTERNAL MEDICINE

## 2023-01-05 PROCEDURE — 99214 OFFICE O/P EST MOD 30 MIN: CPT | Performed by: INTERNAL MEDICINE

## 2023-01-05 RX ORDER — NIFEDIPINE 60 MG/1
TABLET, FILM COATED, EXTENDED RELEASE ORAL
Qty: 90 TABLET | Refills: 0 | Status: SHIPPED | OUTPATIENT
Start: 2023-01-05

## 2023-01-05 RX ORDER — FAMOTIDINE 20 MG
1 TABLET ORAL DAILY
Qty: 90 CAPSULE | Refills: 0 | Status: SHIPPED | OUTPATIENT
Start: 2023-01-05

## 2023-01-05 RX ORDER — TELMISARTAN 20 MG/1
TABLET ORAL
Qty: 90 TABLET | Refills: 0 | Status: SHIPPED | OUTPATIENT
Start: 2023-01-05

## 2023-01-05 ASSESSMENT — PATIENT HEALTH QUESTIONNAIRE - PHQ9
1. LITTLE INTEREST OR PLEASURE IN DOING THINGS: 0
SUM OF ALL RESPONSES TO PHQ QUESTIONS 1-9: 0
2. FEELING DOWN, DEPRESSED OR HOPELESS: 0
SUM OF ALL RESPONSES TO PHQ QUESTIONS 1-9: 0
SUM OF ALL RESPONSES TO PHQ9 QUESTIONS 1 & 2: 0
SUM OF ALL RESPONSES TO PHQ QUESTIONS 1-9: 0
SUM OF ALL RESPONSES TO PHQ QUESTIONS 1-9: 0

## 2023-01-05 NOTE — PATIENT INSTRUCTIONS
TAKE MED AS ADVISED    DIET/ EXERCISE. FOLLOW UP WITHIN 2 MONTHS / AS NEEDED    FOLLOW UP FOR LABS, CAT SCAN, GI    [facility] Laboratory Locations - No appointment necessary. @ indicates the location is open Saturdays in addition to Monday through Friday. Call your preferred location for test preparation, business hours and other information you need. [employer] accepts [employer]. [facility]    @ [place] [facility] S[facility]. [address] 989 [facility], [address]   Ph: [phone] [facility] Lab Svcs. [address]   Ph: [phone]  @ [place] [facility] Svcs. 3155 [facility]   Ph: [phone]    [facility] Lab Svcs. [address]   Ph: [phone] @ [place] [facility]. [address]  Ph: [phone] @ Preethi [facility] Lab Svcs. 3215 [facility]. [address] [place] [address] Luis Felipe Saunders [address]   Ph: [phone]     [facility] Svcs. Lizella [address] [place] [address], Christine Ames 19  Ph: [phone]    [place]   @ [place] [facility]. [address] [facility] [place], [place] [address]   Ph: [phone] [facility] Med. Office Bldg. 3280 [facility]peECU [facility] HospitalMendieta Avon[facility], 800 [facility]  Ph: [address] [place] [address]   [facility] [address]:  [address] Lab Svcs. 54 [facility]   Ph: [phone] [facility]. Lab Svcs.   211 [facility], [address]. [facility]   Ph: [phone]

## 2023-01-05 NOTE — PROGRESS NOTES
SUBJECTIVE:  Ame Mcmanus is a 36 y.o. female [address] [place]   Chief Complaint   Patient presents with    Other    Hypertension      PT HERE FOR EVAL     DIARRHEA - INTERMITTENT PAST FEW MONTHS. LAST PAST 2 DAYS. ? 4 BM/ DAY. LOOSE WATERY, NON BLOODY, ? MUCUS. NO SICK CONTACTS. NO KNOWN H/O IBD  ABD CRAMPING - INTERMITTENT. PAST FEW MONTHS. MOSTLY LOWER ABD. + NAUSEA, OCC VOMITING, OCC DIARRHEA, No CONSTIPATION, No MELENA, No HEMATOCHEZIA. NO SICK CONTACTS. NO F/C NO RECENT TRAVELS. + FH CELIAC DISEASE  HTN - TAKING MEDS . BP NOTED.  + DIET / EXERCISE COMPLIANCE. OCC HEADACHE , DIZZINESS No.   HLP -  ? EXERCISE / DIET COMPLIANCE . PREVIOUS LABS D/W PT  ASTHMA - DENIES WHEEZING, NO SOB, NO RECENT INHALER USE   VIT D DEF - TAKING MED .  LAB D/W PT  ALLERGIC RHINITIS -  +  NASAL CONGESTION , NO POSTNASAL DRAINAGE , NO SINUS PRESSURE, OCC HA, + SNEEZING, NO WATERY ITCHY EYES. DENIES CP, ? No SOB, No PALPITATIONS, No COUGH, NO F/C  No DYSURIA, ? No FREQ, No URGENCY, No HEMATURIA    PMH: REVIEWED AND UPDATED TODAY    PSH: REVIEWED AND UPDATED TODAY    SOCIAL HX: REVIEWED AND UPDATED TODAY    FAMILY HX: REVIEWED AND UPDATED TODAY    ALLERGY:  Flector [diclofenac epolamine]    MEDS: REVIEWED  Prior to Visit Medications    Medication Sig Taking?  Authorizing Provider   telmisartan (MICARDIS) 20 MG tablet TAKE 1 TABLET BY MOUTH ONE TIME A DAY Yes Amanda Pike MD   NIFEdipine (ADALAT CC) 60 MG extended release tablet TAKE 1 TABLET BY MOUTH ONE TIME A DAY Yes Amanda Pike MD   Vitamin D, Cholecalciferol, 25 MCG (1000 UT) CAPS Take 1,000 Units by mouth daily Yes Amanda Pike MD   dicyclomine (BENTYL) 10 MG capsule Take 1 capsule by mouth 4 times daily (before meals and nightly) Yes Amanda Pike MD   ibuprofen (ADVIL;MOTRIN) 800 MG tablet Take 1 tablet by mouth every 8 hours Yes Amanda Pike MD   etonogestrel-ethinyl estradiol (NUVARING) 0.12-0.015 MG/24HR vaginal ring PLACE 1 RING VAGINALLY EVERY 28 DAYS. LEAVE IN PLACE FOR 3 WEEKS, THEN REMOVE FOR 1 WEEK. Yes Tony Mcmanus DO   albuterol sulfate  (90 Base) MCG/ACT inhaler Inhale 2 puffs into the lungs 4 times daily as needed for Wheezing or Shortness of Breath Yes River Mars MD   Blood Pressure KIT 1 Device by Does not apply route 2 times daily Dx: F73 Yes Tony Mcmanus DO   cetirizine (ZYRTEC) 10 MG tablet Take 1 tablet by mouth daily as needed for Allergies. Yes River Mars MD       ROS: COMPREHENSIVE ROS AS IN HX, REST -VE  History obtained from chart review and the patient       OBJECTIVE:   NURSING NOTE AND VITALS REVIEWED  /84 (Site: Right Lower Arm, Position: Sitting, Cuff Size: Small Adult)   Pulse 90   Ht 5' 2\" (1.575 m)   Wt 165 lb 3.2 oz (74.9 kg)   SpO2 99%   BMI 30.22 kg/m²     NO ACUTE DISTRESS    REPEAT BP: 120/78(RT), NO ORTHOSTASIS    TEMP: 98.9F     Body mass index is 30.22 kg/m². HEENT: NO PALLOR, ANICTERIC, PERRLA, EOMI, NO CONJUNCTIVAL ERYTHEMA,                 NO SINUS TENDERNESS  NECK:  SUPPLE, TRACHEA MIDLINE, NT, NO JVD, NO CB, NO LA, NO TM, NO STIFFNESS  CHEST: RESPY EFFORT NL, GOOD AE, NO W/R/C  HEART: S1S2+ REG, NO M/G/R  ABD: SOFT, NT, NO GUARDING, NO RIGIDITY, NO HSM, BS+  EXT: NO EDEMA, NT, PULSES +. MILTON'S -VE  NEURO: ALERT AND ORIENTED X 3, NO MENINGEAL SIGNS, NO TREMORS, NL GAIT, NO FOCAL DEFICITS  PSYCH: FAIRLY GOOD AFFECT  BACK: NT, NO ROM, NO CVA TENDERNESS     PREVIOUS LABS REVIEWED AND D/W PT    UA: NEGATIVE FOR UTI    PREG TEST: NEGATIVE      ASSESSMENT / PLAN:     Diagnosis Orders   1. Frequent diarrhea  COUNSELLED. RECURRENT. SYMPTOMATIC RX. MAINTAIN HYDRATION. STOOL STUDIES TO EVAL - INCLUDING C .DIFF  ADVISED DIETARY MODIFICATION  MONITOR FOR IBS   REFER GI FOR EVAL - R/O IBD  MAKE CHANGES AS NEEDED. 2. Abdominal cramping  COUNSELLED. SYMPTOMATIC RX  F/U LABS, CT TO EVAL  REFER GI FOR FURTHER EVAL  MAKE CHANGES AS NEEDED.        3. Primary hypertension COUNSELLED. CONTROLLED. CONTINUE MEDS. LOW NA+ / DASH DIET/ EXERCISE. MONITOR. GOAL </= 130/80  F/U LABS  MAKE CHANGES AS NEEDED. 4. Mixed hyperlipidemia  COUNSELLED. ADVISED LOW FAT / CHOL DIET/ EXERCISE.  MONITOR. GOALS D/W PT.  MAKE CHANGES AS NEEDED. 5. Mild intermittent asthma without complication  COUNSELLED. ALBUTEROL PRN. MONITOR. MONITOR FOR TRIGGERS- ENVIRONMENTAL MODIFICATION. MAKE CHANGES AS NEEDED. 6. Vitamin D deficiency  COUNSELLED. MONITOR ON VIT D SUPPLEMENT. MAKE CHANGES AS NEEDED. 7. Other allergic rhinitis  COUNSELLED. SYMPTOMATIC RX. MED PRN. MONITOR  MAKE CHANGES AS NEEDED.                        MEDICATION SIDE EFFECTS D/W PATIENT      RETURN TO CLINIC WITHIN 2 MONTHS / PRN    FOLLOW UP FOR LABS, CAT SCAN, GI

## 2023-01-06 DIAGNOSIS — R19.7 FREQUENT DIARRHEA: ICD-10-CM

## 2023-01-06 DIAGNOSIS — R10.9 ABDOMINAL CRAMPING: ICD-10-CM

## 2023-01-06 DIAGNOSIS — E55.9 VITAMIN D DEFICIENCY: ICD-10-CM

## 2023-01-06 LAB
A/G RATIO: 1.4 (ref 1.1–2.2)
ALBUMIN SERPL-MCNC: 4.1 G/DL (ref 3.4–5)
ALP BLD-CCNC: 41 U/L (ref 40–129)
ALT SERPL-CCNC: 9 U/L (ref 10–40)
ANION GAP SERPL CALCULATED.3IONS-SCNC: 16 MMOL/L (ref 3–16)
AST SERPL-CCNC: 12 U/L (ref 15–37)
BASOPHILS ABSOLUTE: 0 K/UL (ref 0–0.2)
BASOPHILS RELATIVE PERCENT: 0.4 %
BILIRUB SERPL-MCNC: 0.4 MG/DL (ref 0–1)
BUN BLDV-MCNC: 9 MG/DL (ref 7–20)
CALCIUM SERPL-MCNC: 9.9 MG/DL (ref 8.3–10.6)
CHLORIDE BLD-SCNC: 109 MMOL/L (ref 99–110)
CO2: 18 MMOL/L (ref 21–32)
CREAT SERPL-MCNC: 0.8 MG/DL (ref 0.6–1.1)
EOSINOPHILS ABSOLUTE: 0.2 K/UL (ref 0–0.6)
EOSINOPHILS RELATIVE PERCENT: 2.2 %
GFR SERPL CREATININE-BSD FRML MDRD: >60 ML/MIN/{1.73_M2}
GLUCOSE BLD-MCNC: 88 MG/DL (ref 70–99)
HCT VFR BLD CALC: 37.9 % (ref 36–48)
HEMOGLOBIN: 12.5 G/DL (ref 12–16)
LIPASE: 25 U/L (ref 13–60)
LYMPHOCYTES ABSOLUTE: 3.4 K/UL (ref 1–5.1)
LYMPHOCYTES RELATIVE PERCENT: 44.8 %
MCH RBC QN AUTO: 31.3 PG (ref 26–34)
MCHC RBC AUTO-ENTMCNC: 32.9 G/DL (ref 31–36)
MCV RBC AUTO: 95 FL (ref 80–100)
MONOCYTES ABSOLUTE: 0.5 K/UL (ref 0–1.3)
MONOCYTES RELATIVE PERCENT: 6.2 %
NEUTROPHILS ABSOLUTE: 3.5 K/UL (ref 1.7–7.7)
NEUTROPHILS RELATIVE PERCENT: 46.4 %
PDW BLD-RTO: 12.9 % (ref 12.4–15.4)
PLATELET # BLD: 387 K/UL (ref 135–450)
PMV BLD AUTO: 7.5 FL (ref 5–10.5)
POTASSIUM SERPL-SCNC: 3.7 MMOL/L (ref 3.5–5.1)
RBC # BLD: 3.98 M/UL (ref 4–5.2)
SODIUM BLD-SCNC: 143 MMOL/L (ref 136–145)
TOTAL PROTEIN: 7.1 G/DL (ref 6.4–8.2)
VITAMIN D 25-HYDROXY: 24.2 NG/ML
WBC # BLD: 7.5 K/UL (ref 4–11)

## 2023-01-09 DIAGNOSIS — R19.7 FREQUENT DIARRHEA: ICD-10-CM

## 2023-01-09 LAB
C DIFF TOXIN/ANTIGEN: NORMAL
WHITE BLOOD CELLS (WBC), STOOL: ABNORMAL

## 2023-01-09 RX ORDER — ETONOGESTREL AND ETHINYL ESTRADIOL 11.7; 2.7 MG/1; MG/1
INSERT, EXTENDED RELEASE VAGINAL
Qty: 3 EACH | Refills: 1 | Status: SHIPPED | OUTPATIENT
Start: 2023-01-09

## 2023-01-09 NOTE — TELEPHONE ENCOUNTER
Pt verified she does need Rx and I placed pt on a recall list as we are unable to schedule her annual at this time. Pt requested more than one month of birth control at a time, can we send Rx with more refills? Please sign or advise.

## 2023-01-10 LAB — GI BACTERIAL PATHOGENS BY PCR: NORMAL

## 2023-01-11 ENCOUNTER — HOSPITAL ENCOUNTER (OUTPATIENT)
Dept: CT IMAGING | Age: 43
Discharge: HOME | End: 2023-01-11
Payer: COMMERCIAL

## 2023-01-11 DIAGNOSIS — R10.9 ABDOMINAL CRAMPING: ICD-10-CM

## 2023-01-11 DIAGNOSIS — R19.7 FREQUENT DIARRHEA: ICD-10-CM

## 2023-01-11 PROCEDURE — 74176 CT ABD & PELVIS W/O CONTRAST: CPT

## 2023-01-17 LAB — INTERPRETATION: NEGATIVE

## 2023-07-18 RX ORDER — ETONOGESTREL AND ETHINYL ESTRADIOL 11.7; 2.7 MG/1; MG/1
INSERT, EXTENDED RELEASE VAGINAL
Qty: 3 EACH | Refills: 1 | Status: SHIPPED | OUTPATIENT
Start: 2023-07-18

## 2023-07-26 ENCOUNTER — OFFICE VISIT (OUTPATIENT)
Dept: INTERNAL MEDICINE CLINIC | Age: 43
End: 2023-07-26
Payer: COMMERCIAL

## 2023-07-26 VITALS
BODY MASS INDEX: 29.77 KG/M2 | SYSTOLIC BLOOD PRESSURE: 120 MMHG | DIASTOLIC BLOOD PRESSURE: 88 MMHG | TEMPERATURE: 98.5 F | OXYGEN SATURATION: 98 % | HEART RATE: 86 BPM | HEIGHT: 62 IN | WEIGHT: 161.8 LBS

## 2023-07-26 DIAGNOSIS — J02.9 PHARYNGITIS, UNSPECIFIED ETIOLOGY: Primary | ICD-10-CM

## 2023-07-26 LAB — S PYO AG THROAT QL: NORMAL

## 2023-07-26 PROCEDURE — 3074F SYST BP LT 130 MM HG: CPT | Performed by: NURSE PRACTITIONER

## 2023-07-26 PROCEDURE — G8417 CALC BMI ABV UP PARAM F/U: HCPCS | Performed by: NURSE PRACTITIONER

## 2023-07-26 PROCEDURE — G8427 DOCREV CUR MEDS BY ELIG CLIN: HCPCS | Performed by: NURSE PRACTITIONER

## 2023-07-26 PROCEDURE — 3079F DIAST BP 80-89 MM HG: CPT | Performed by: NURSE PRACTITIONER

## 2023-07-26 PROCEDURE — 1036F TOBACCO NON-USER: CPT | Performed by: NURSE PRACTITIONER

## 2023-07-26 PROCEDURE — 99213 OFFICE O/P EST LOW 20 MIN: CPT | Performed by: NURSE PRACTITIONER

## 2023-07-26 PROCEDURE — 87880 STREP A ASSAY W/OPTIC: CPT | Performed by: NURSE PRACTITIONER

## 2023-07-26 RX ORDER — AMOXICILLIN 500 MG/1
500 CAPSULE ORAL 3 TIMES DAILY
Qty: 21 CAPSULE | Refills: 0 | Status: SHIPPED | OUTPATIENT
Start: 2023-07-26 | End: 2023-08-02

## 2023-07-26 RX ORDER — POLYETHYLENE GLYCOL 3350, SODIUM SULFATE, SODIUM CHLORIDE, POTASSIUM CHLORIDE, ASCORBIC ACID, SODIUM ASCORBATE 140-9-5.2G
KIT ORAL
COMMUNITY
End: 2023-07-26

## 2023-07-26 SDOH — ECONOMIC STABILITY: FOOD INSECURITY: WITHIN THE PAST 12 MONTHS, THE FOOD YOU BOUGHT JUST DIDN'T LAST AND YOU DIDN'T HAVE MONEY TO GET MORE.: NEVER TRUE

## 2023-07-26 SDOH — ECONOMIC STABILITY: INCOME INSECURITY: HOW HARD IS IT FOR YOU TO PAY FOR THE VERY BASICS LIKE FOOD, HOUSING, MEDICAL CARE, AND HEATING?: NOT HARD AT ALL

## 2023-07-26 SDOH — ECONOMIC STABILITY: FOOD INSECURITY: WITHIN THE PAST 12 MONTHS, YOU WORRIED THAT YOUR FOOD WOULD RUN OUT BEFORE YOU GOT MONEY TO BUY MORE.: NEVER TRUE

## 2023-07-26 SDOH — ECONOMIC STABILITY: HOUSING INSECURITY
IN THE LAST 12 MONTHS, WAS THERE A TIME WHEN YOU DID NOT HAVE A STEADY PLACE TO SLEEP OR SLEPT IN A SHELTER (INCLUDING NOW)?: NO

## 2023-07-26 ASSESSMENT — ENCOUNTER SYMPTOMS
RESPIRATORY NEGATIVE: 1
NAUSEA: 0
EYES NEGATIVE: 1
SINUS PRESSURE: 1
SORE THROAT: 1
VOMITING: 0
RHINORRHEA: 1

## 2023-07-26 NOTE — PROGRESS NOTES
Patient: Deena Li is a 39 y.o. female who presents today with the following Chief Complaint(s):  Chief Complaint   Patient presents with    Pharyngitis     Started x7 days ago       HPI    Pharyngitis  This is a new problem. Episode onset: 10 days. The problem occurs constantly. The problem has been unchanged. Associated symptoms include neck pain and a sore throat. Pertinent negatives include no congestion, fatigue, fever, headaches, myalgias, nausea or vomiting. The symptoms are aggravated by eating and swallowing. She has tried NSAIDs for the symptoms. The treatment provided moderate relief. Current Outpatient Medications   Medication Sig Dispense Refill    amoxicillin (AMOXIL) 500 MG capsule Take 1 capsule by mouth 3 times daily for 7 days 21 capsule 0    etonogestrel-ethinyl estradiol (ELURYNG) 0.12-0.015 MG/24HR vaginal ring INSERT 1 RING VAGINALLY EVERY 28 DAYS. LEAVE IN PLACE FOR 3 WEEKS, THEN REMOVE FOR 1 WEEK 3 each 1    telmisartan (MICARDIS) 20 MG tablet TAKE 1 TABLET BY MOUTH ONE TIME A DAY 90 tablet 0    NIFEdipine (ADALAT CC) 60 MG extended release tablet TAKE 1 TABLET BY MOUTH ONE TIME A DAY 90 tablet 0    Vitamin D, Cholecalciferol, 25 MCG (1000 UT) CAPS Take 1,000 Units by mouth daily 90 capsule 0    ibuprofen (ADVIL;MOTRIN) 800 MG tablet Take 1 tablet by mouth every 8 hours 60 tablet 0    albuterol sulfate  (90 Base) MCG/ACT inhaler Inhale 2 puffs into the lungs 4 times daily as needed for Wheezing or Shortness of Breath 1 Inhaler 0    Blood Pressure KIT 1 Device by Does not apply route 2 times daily Dx: I10 1 kit 0    cetirizine (ZYRTEC) 10 MG tablet Take 1 tablet by mouth daily as needed for Allergies       No current facility-administered medications for this visit. Patient's past medical history, surgical history, family history, medications,  and allergies  were all reviewed and updated as appropriate today.     Patient Active Problem List   Diagnosis    HTN

## 2023-07-26 NOTE — PATIENT INSTRUCTIONS
[facility] Laboratory Locations - No appointment necessary. ? indicates the location is open Saturdays in addition to Monday through Friday. Call your preferred location for test preparation, business hours and other information you need. [employer] accepts [employer]. [facility]    ? [place] [id number] Liliana King [facility], [address]    Ph: 2000 Marci Noland [place], [address] [facility] [address]    Ph: [phone]   ? 433 [facility].,    [address]    Ph: 1700 Omar Grajeda, [address] [facility]    Ph: [phone] ? [place]   [address]   Ph: [phone]  ? 707 [facility], 211 [facility]    Ph: [place] 201 [facility], 1235 [facility]   Ph: [phone]    NORTH    ? [place], [place] [address]    Ph: [phone]  [facility]   1221 [facility] 1800 [facility], [address]   Ph: Mukesh Resendiz. [place], [address]    [address] [facility]: 739 019 Cristy [facility]5 [facility]    Ph: 713 [facility].  [facility]1 [place], [place] [address]    Ph: [phone]

## 2023-08-23 ENCOUNTER — OFFICE VISIT (OUTPATIENT)
Dept: INTERNAL MEDICINE CLINIC | Age: 43
End: 2023-08-23
Payer: COMMERCIAL

## 2023-08-23 VITALS
SYSTOLIC BLOOD PRESSURE: 126 MMHG | WEIGHT: 168 LBS | BODY MASS INDEX: 30.91 KG/M2 | HEIGHT: 62 IN | DIASTOLIC BLOOD PRESSURE: 88 MMHG | OXYGEN SATURATION: 100 % | HEART RATE: 70 BPM

## 2023-08-23 DIAGNOSIS — I10 PRIMARY HYPERTENSION: Primary | ICD-10-CM

## 2023-08-23 DIAGNOSIS — J45.20 MILD INTERMITTENT ASTHMA WITHOUT COMPLICATION: ICD-10-CM

## 2023-08-23 DIAGNOSIS — E55.9 VITAMIN D DEFICIENCY: ICD-10-CM

## 2023-08-23 DIAGNOSIS — E78.2 MIXED HYPERLIPIDEMIA: ICD-10-CM

## 2023-08-23 DIAGNOSIS — J30.89 OTHER ALLERGIC RHINITIS: ICD-10-CM

## 2023-08-23 PROCEDURE — G8427 DOCREV CUR MEDS BY ELIG CLIN: HCPCS | Performed by: INTERNAL MEDICINE

## 2023-08-23 PROCEDURE — 3079F DIAST BP 80-89 MM HG: CPT | Performed by: INTERNAL MEDICINE

## 2023-08-23 PROCEDURE — 3074F SYST BP LT 130 MM HG: CPT | Performed by: INTERNAL MEDICINE

## 2023-08-23 PROCEDURE — G8417 CALC BMI ABV UP PARAM F/U: HCPCS | Performed by: INTERNAL MEDICINE

## 2023-08-23 PROCEDURE — 1036F TOBACCO NON-USER: CPT | Performed by: INTERNAL MEDICINE

## 2023-08-23 PROCEDURE — 99214 OFFICE O/P EST MOD 30 MIN: CPT | Performed by: INTERNAL MEDICINE

## 2023-08-23 RX ORDER — NIFEDIPINE 60 MG/1
TABLET, FILM COATED, EXTENDED RELEASE ORAL
Qty: 90 TABLET | Refills: 0 | Status: SHIPPED | OUTPATIENT
Start: 2023-08-23

## 2023-08-23 RX ORDER — FAMOTIDINE 20 MG
1 TABLET ORAL DAILY
Qty: 90 CAPSULE | Refills: 0 | Status: SHIPPED | OUTPATIENT
Start: 2023-08-23

## 2023-08-23 RX ORDER — TELMISARTAN 20 MG/1
TABLET ORAL
Qty: 90 TABLET | Refills: 0 | Status: SHIPPED | OUTPATIENT
Start: 2023-08-23

## 2023-08-23 NOTE — PROGRESS NOTES
SUBJECTIVE:  Leamon Closs is a 39 y.o. female HERE FOR   Chief Complaint   Patient presents with    Follow-up    Hypertension        PT HERE FOR EVAL       HTN - OUT OF MEDS. BP NOTED.  + DIET / EXERCISE COMPLIANCE. OCC HEADACHE , DIZZINESS No.   HLP -  ? EXERCISE / DIET COMPLIANCE . PREVIOUS LABS D/W PT  ASTHMA - DENIES WHEEZING, NO SOB, NO RECENT INHALER USE   VIT D DEF - TAKING MED .  LAB D/W PT  ALLERGIC RHINITIS -  OCC  NASAL CONGESTION , NO POSTNASAL DRAINAGE , NO SINUS PRESSURE, OCC HA, + SNEEZING, NO WATERY ITCHY EYES. DENIES CP, ? No SOB, No PALPITATIONS, No COUGH, NO F/C  ? NO ABD PAIN, No N/V, OCC DIARRHEA, No CONSTIPATION, No MELENA, No HEMATOCHEZIA. No DYSURIA,  No FREQ, No URGENCY, No HEMATURIA       PMH: REVIEWED AND UPDATED TODAY    PSH: REVIEWED AND UPDATED TODAY    SOCIAL HX: REVIEWED AND UPDATED TODAY    FAMILY HX: REVIEWED AND UPDATED TODAY    ALLERGY:  Flector [diclofenac epolamine]    MEDS: REVIEWED  Prior to Visit Medications    Medication Sig Taking? Authorizing Provider   etonogestrel-ethinyl estradiol (ELURYNG) 0.12-0.015 MG/24HR vaginal ring INSERT 1 RING VAGINALLY EVERY 28 DAYS.  LEAVE IN PLACE FOR 3 WEEKS, THEN REMOVE FOR 1 WEEK Yes Mary Mcmanus DO   telmisartan (MICARDIS) 20 MG tablet TAKE 1 TABLET BY MOUTH ONE TIME A DAY Yes Thomasena Cooks, MD   NIFEdipine (ADALAT CC) 60 MG extended release tablet TAKE 1 TABLET BY MOUTH ONE TIME A DAY Yes Thomasena Cooks, MD   Vitamin D, Cholecalciferol, 25 MCG (1000 UT) CAPS Take 1,000 Units by mouth daily Yes Thomasena Cooks, MD   ibuprofen (ADVIL;MOTRIN) 800 MG tablet Take 1 tablet by mouth every 8 hours Yes Thomasena Cooks, MD   albuterol sulfate  (90 Base) MCG/ACT inhaler Inhale 2 puffs into the lungs 4 times daily as needed for Wheezing or Shortness of Breath Yes Thomasena Cooks, MD   Blood Pressure KIT 1 Device by Does not apply route 2 times daily Dx: E82 Yes Mary Mcmanus DO   cetirizine (ZYRTEC) 10 MG

## 2023-08-23 NOTE — PATIENT INSTRUCTIONS
TAKE MED AS ADVISED    DIET/ EXERCISE. FOLLOW UP WITHIN 4 MONTHS  / AS NEEDED    FOLLOW UP FOR FASTING [address] [facility] Locations - No appointment necessary. ? indicates the location is open Saturdays in addition to Monday through Friday. Call your preferred location for test preparation, business hours and other information you need. [employer] accepts [employer]. CENTRAL  [facility]    ? [place] [id number] [facility] [facility], [address]    Ph: [address], [address] [facility] [address]    Ph: [phone]   ? [address]    [place], [address] [place] [address]    Ph: [address]00 Omar Grajeda, [address] [facility]    Ph: [phone] ? [place]   [address] [facility] [address]   Ph: [phone]  ? 707 [facility], [address] [facility]    Ph: [place] [address] [facility], [address] [facility]   Ph: [phone]    NORTH    ? [facility] [place] [address]    Ph: [phone]  [facility]   [address] [facility], [facility]   Ph: Mukesh Lai [place], [phone] [facility]: [address]    Ph: 713 [facility].  [facility] [place] [place], [place] [address]    Ph: [phone]

## 2023-11-10 ENCOUNTER — HOSPITAL ENCOUNTER (OUTPATIENT)
Age: 43
Discharge: HOME | End: 2023-11-10
Payer: COMMERCIAL

## 2023-11-10 ENCOUNTER — OFFICE VISIT (OUTPATIENT)
Dept: OBGYN CLINIC | Age: 43
End: 2023-11-10

## 2023-11-10 VITALS
SYSTOLIC BLOOD PRESSURE: 128 MMHG | HEART RATE: 89 BPM | TEMPERATURE: 98.2 F | DIASTOLIC BLOOD PRESSURE: 74 MMHG | WEIGHT: 167.2 LBS | BODY MASS INDEX: 30.58 KG/M2

## 2023-11-10 DIAGNOSIS — N89.8 VAGINAL ITCHING: ICD-10-CM

## 2023-11-10 DIAGNOSIS — Z01.419 WOMEN'S ANNUAL ROUTINE GYNECOLOGICAL EXAMINATION: Primary | ICD-10-CM

## 2023-11-10 DIAGNOSIS — Z98.891 HISTORY OF C-SECTION: ICD-10-CM

## 2023-11-10 DIAGNOSIS — N89.8 VAGINAL DISCHARGE: ICD-10-CM

## 2023-11-10 DIAGNOSIS — I10 PRIMARY HYPERTENSION: ICD-10-CM

## 2023-11-10 DIAGNOSIS — E78.2 MIXED HYPERLIPIDEMIA: ICD-10-CM

## 2023-11-10 DIAGNOSIS — N90.89 CLITOROMEGALY: ICD-10-CM

## 2023-11-10 DIAGNOSIS — Z12.4 PAP SMEAR FOR CERVICAL CANCER SCREENING: ICD-10-CM

## 2023-11-10 DIAGNOSIS — E55.9 VITAMIN D DEFICIENCY: ICD-10-CM

## 2023-11-10 DIAGNOSIS — Z12.31 ENCOUNTER FOR SCREENING MAMMOGRAM FOR MALIGNANT NEOPLASM OF BREAST: ICD-10-CM

## 2023-11-10 LAB
25(OH)D3 SERPL-MCNC: 18 NG/ML
ALBUMIN SERPL-MCNC: 4.5 G/DL (ref 3.4–5)
ALBUMIN/GLOB SERPL: 1.6 {RATIO} (ref 1.1–2.2)
ALP SERPL-CCNC: 44 U/L (ref 40–129)
ALT SERPL-CCNC: 9 U/L (ref 10–40)
ANION GAP SERPL CALCULATED.3IONS-SCNC: 12 MMOL/L (ref 3–16)
AST SERPL-CCNC: 12 U/L (ref 15–37)
BILIRUB SERPL-MCNC: 0.3 MG/DL (ref 0–1)
BUN SERPL-MCNC: 8 MG/DL (ref 7–20)
CALCIUM SERPL-MCNC: 9.3 MG/DL (ref 8.3–10.6)
CHLORIDE SERPL-SCNC: 110 MMOL/L (ref 99–110)
CHOLEST SERPL-MCNC: 211 MG/DL (ref 0–199)
CO2 SERPL-SCNC: 19 MMOL/L (ref 21–32)
CREAT SERPL-MCNC: 0.7 MG/DL (ref 0.6–1.1)
CREAT UR-MCNC: 214.6 MG/DL (ref 28–259)
GFR SERPLBLD CREATININE-BSD FMLA CKD-EPI: >60 ML/MIN/{1.73_M2}
GLUCOSE SERPL-MCNC: 107 MG/DL (ref 70–99)
HDLC SERPL-MCNC: 53 MG/DL (ref 40–60)
LDLC SERPL CALC-MCNC: 130 MG/DL
MICROALBUMIN UR DL<=1MG/L-MCNC: 3.4 MG/DL
MICROALBUMIN/CREAT UR: 15.8 MG/G (ref 0–30)
POTASSIUM SERPL-SCNC: 3.5 MMOL/L (ref 3.5–5.1)
PROT SERPL-MCNC: 7.4 G/DL (ref 6.4–8.2)
SODIUM SERPL-SCNC: 141 MMOL/L (ref 136–145)
TRIGL SERPL-MCNC: 140 MG/DL (ref 0–150)
TSH SERPL DL<=0.005 MIU/L-ACNC: 0.72 UIU/ML (ref 0.27–4.2)
VLDLC SERPL CALC-MCNC: 28 MG/DL

## 2023-11-10 PROCEDURE — 80061 LIPID PANEL: CPT

## 2023-11-10 PROCEDURE — 82043 UR ALBUMIN QUANTITATIVE: CPT

## 2023-11-10 PROCEDURE — 84270 ASSAY OF SEX HORMONE GLOBUL: CPT

## 2023-11-10 PROCEDURE — 84403 ASSAY OF TOTAL TESTOSTERONE: CPT

## 2023-11-10 PROCEDURE — 36415 COLL VENOUS BLD VENIPUNCTURE: CPT

## 2023-11-10 PROCEDURE — 80053 COMPREHEN METABOLIC PANEL: CPT

## 2023-11-10 PROCEDURE — 83498 ASY HYDROXYPROGESTERONE 17-D: CPT

## 2023-11-10 PROCEDURE — 82570 ASSAY OF URINE CREATININE: CPT

## 2023-11-10 PROCEDURE — 82627 DEHYDROEPIANDROSTERONE: CPT

## 2023-11-10 PROCEDURE — 82157 ASSAY OF ANDROSTENEDIONE: CPT

## 2023-11-10 PROCEDURE — 84443 ASSAY THYROID STIM HORMONE: CPT

## 2023-11-10 PROCEDURE — 82306 VITAMIN D 25 HYDROXY: CPT

## 2023-11-11 LAB
CANDIDA DNA VAG QL NAA+PROBE: NORMAL
G VAGINALIS DNA SPEC QL NAA+PROBE: NORMAL
T VAGINALIS DNA VAG QL NAA+PROBE: NORMAL

## 2023-11-11 RX ORDER — ETONOGESTREL AND ETHINYL ESTRADIOL VAGINAL .015; .12 MG/D; MG/D
RING VAGINAL
Qty: 3 EACH | Refills: 3 | Status: SHIPPED | OUTPATIENT
Start: 2023-11-11

## 2023-11-11 ASSESSMENT — ENCOUNTER SYMPTOMS
CONSTIPATION: 0
DIARRHEA: 0
ABDOMINAL PAIN: 0
NAUSEA: 0
SHORTNESS OF BREATH: 0
VOMITING: 0
ABDOMINAL DISTENTION: 0

## 2023-11-11 NOTE — PROGRESS NOTES
Subjective:      Patient ID: Lula Lopez is a 39 y.o. female. HPI  40 y/o  female presents for well woman examination. Last pap smear was 22--normal.  Tested negative for high risk HPV in 2018. No history of abnormal menses. Menses occur every month x 5 days, medium flow, no dysmenorrhea. Patient has been utilizing [place]. Had tried Nexplanon (19). Nexplanon was removed due to irregular persistent bleeding. Patient has not noted any recent issues with blood pressure (stable) or issues in the past with use of OCPs and Nuvaring. Patient would like for  to have vasectomy--he is unwilling. History is positive for hirsutism and clitoromegaly. Testosterone level last checked on 2010. Patient is 4-5 years s/p PLTCS for arrest of descent at 39 weeks 0 days gestation. Pregnancy was complicated by gestational hypertension, chronic hypertension, advanced maternal age, anemia and asthma. Son has been diagnosed with mild form of autism. Non smoker. Takes telmisartan (Micardis) and nifedipine (Adalat) for blood pressure management    Review of Systems   Constitutional:  Negative for activity change, appetite change, chills, fatigue, fever and unexpected weight change. Eyes:  Positive for visual disturbance. Photophobia: vision changes felt to be secondary to age. Respiratory:  Negative for shortness of breath. Cardiovascular:  Negative for chest pain and palpitations. Gastrointestinal:  Negative for abdominal distention, abdominal pain, constipation, diarrhea, nausea and vomiting. Endocrine: Negative for cold intolerance and heat intolerance. Genitourinary:  Negative for difficulty urinating, dyspareunia, dysuria, frequency, genital sores, hematuria, menstrual problem, pelvic pain, urgency, vaginal bleeding, vaginal discharge and vaginal pain. External pruritis   Skin:  Negative for rash. Neurological:  Negative for headaches.    Hematological:

## 2023-11-12 LAB — ANDROST SERPL-MCNC: 0.86 NG/ML (ref 0.13–0.82)

## 2023-11-14 LAB
17OHP SERPL-MCNC: 19.3 NG/DL
HPV HR 12 DNA SPEC QL NAA+PROBE: NOT DETECTED
HPV16 DNA SPEC QL NAA+PROBE: NOT DETECTED
HPV16+18+H RISK 12 DNA SPEC-IMP: NORMAL
HPV18 DNA SPEC QL NAA+PROBE: NOT DETECTED

## 2023-11-15 LAB
DHEA-S SERPL-MCNC: 211 UG/DL (ref 32–240)
SHBG SERPL-SCNC: 103 NMOL/L (ref 30–135)
TESTOST FREE SERPL-MCNC: 1.1 PG/ML (ref 1.1–5.8)
TESTOST SERPL-MCNC: 14 NG/DL (ref 20–70)

## 2024-01-11 ASSESSMENT — PATIENT HEALTH QUESTIONNAIRE - PHQ9
SUM OF ALL RESPONSES TO PHQ QUESTIONS 1-9: 0
2. FEELING DOWN, DEPRESSED OR HOPELESS: 0
SUM OF ALL RESPONSES TO PHQ9 QUESTIONS 1 & 2: 0
SUM OF ALL RESPONSES TO PHQ QUESTIONS 1-9: 0
1. LITTLE INTEREST OR PLEASURE IN DOING THINGS: NOT AT ALL
2. FEELING DOWN, DEPRESSED OR HOPELESS: NOT AT ALL
SUM OF ALL RESPONSES TO PHQ QUESTIONS 1-9: 0
SUM OF ALL RESPONSES TO PHQ9 QUESTIONS 1 & 2: 0
1. LITTLE INTEREST OR PLEASURE IN DOING THINGS: 0
SUM OF ALL RESPONSES TO PHQ QUESTIONS 1-9: 0

## 2024-01-17 ENCOUNTER — OFFICE VISIT (OUTPATIENT)
Dept: INTERNAL MEDICINE CLINIC | Age: 44
End: 2024-01-17
Payer: COMMERCIAL

## 2024-01-17 VITALS
DIASTOLIC BLOOD PRESSURE: 80 MMHG | WEIGHT: 169 LBS | SYSTOLIC BLOOD PRESSURE: 120 MMHG | TEMPERATURE: 98.4 F | OXYGEN SATURATION: 96 % | BODY MASS INDEX: 30.91 KG/M2 | HEART RATE: 81 BPM

## 2024-01-17 DIAGNOSIS — I10 PRIMARY HYPERTENSION: ICD-10-CM

## 2024-01-17 DIAGNOSIS — J30.89 OTHER ALLERGIC RHINITIS: ICD-10-CM

## 2024-01-17 DIAGNOSIS — E55.9 VITAMIN D DEFICIENCY: ICD-10-CM

## 2024-01-17 DIAGNOSIS — E78.2 MIXED HYPERLIPIDEMIA: Primary | ICD-10-CM

## 2024-01-17 DIAGNOSIS — Z23 NEED FOR IMMUNIZATION AGAINST INFLUENZA: ICD-10-CM

## 2024-01-17 DIAGNOSIS — E66.9 OBESITY (BMI 30-39.9): ICD-10-CM

## 2024-01-17 DIAGNOSIS — J45.20 MILD INTERMITTENT ASTHMA WITHOUT COMPLICATION: ICD-10-CM

## 2024-01-17 PROCEDURE — 3079F DIAST BP 80-89 MM HG: CPT | Performed by: INTERNAL MEDICINE

## 2024-01-17 PROCEDURE — G8417 CALC BMI ABV UP PARAM F/U: HCPCS | Performed by: INTERNAL MEDICINE

## 2024-01-17 PROCEDURE — 99214 OFFICE O/P EST MOD 30 MIN: CPT | Performed by: INTERNAL MEDICINE

## 2024-01-17 PROCEDURE — 1036F TOBACCO NON-USER: CPT | Performed by: INTERNAL MEDICINE

## 2024-01-17 PROCEDURE — 90471 IMMUNIZATION ADMIN: CPT | Performed by: INTERNAL MEDICINE

## 2024-01-17 PROCEDURE — G8482 FLU IMMUNIZE ORDER/ADMIN: HCPCS | Performed by: INTERNAL MEDICINE

## 2024-01-17 PROCEDURE — 90674 CCIIV4 VAC NO PRSV 0.5 ML IM: CPT | Performed by: INTERNAL MEDICINE

## 2024-01-17 PROCEDURE — G8427 DOCREV CUR MEDS BY ELIG CLIN: HCPCS | Performed by: INTERNAL MEDICINE

## 2024-01-17 PROCEDURE — 3074F SYST BP LT 130 MM HG: CPT | Performed by: INTERNAL MEDICINE

## 2024-01-17 RX ORDER — NIFEDIPINE 60 MG/1
TABLET, FILM COATED, EXTENDED RELEASE ORAL
Qty: 90 TABLET | Refills: 1 | Status: SHIPPED | OUTPATIENT
Start: 2024-01-17

## 2024-01-17 RX ORDER — ACETAMINOPHEN 160 MG
1 TABLET,DISINTEGRATING ORAL DAILY
Qty: 90 CAPSULE | Refills: 0 | Status: SHIPPED | OUTPATIENT
Start: 2024-01-17

## 2024-01-17 RX ORDER — ALBUTEROL SULFATE 90 UG/1
2 AEROSOL, METERED RESPIRATORY (INHALATION) 4 TIMES DAILY PRN
Qty: 1 EACH | Refills: 0 | Status: SHIPPED | OUTPATIENT
Start: 2024-01-17

## 2024-01-17 RX ORDER — TELMISARTAN 20 MG/1
TABLET ORAL
Qty: 90 TABLET | Refills: 1 | Status: SHIPPED | OUTPATIENT
Start: 2024-01-17

## 2024-01-17 NOTE — PROGRESS NOTES
KIT 1 Device by Does not apply route 2 times daily Dx: I10 Yes Laxmi Mcmanus,    cetirizine (ZYRTEC) 10 MG tablet Take 1 tablet by mouth daily as needed for Allergies Yes Meg Kimble MD       ROS: COMPREHENSIVE ROS AS IN HX, REST -VE  History obtained from chart review and the patient       OBJECTIVE:   NURSING NOTE AND VITALS REVIEWED  /82   Pulse 81   Temp 98.2 °F (36.8 °C)   Wt 76.7 kg (169 lb)   SpO2 96%   BMI 30.91 kg/m²     NO ACUTE DISTRESS    REPEAT BP:  120/80 (LT), NO ORTHOSTASIS     Body mass index is 30.91 kg/m².      TEMP: 98.4F    HEENT: NO PALLOR, ANICTERIC, PERRLA, EOMI, NO CONJUNCTIVAL ERYTHEMA,                 NO SINUS TENDERNESS  NECK:  SUPPLE, TRACHEA MIDLINE, NT, NO JVD, NO CB, NO LA, NO TM, NO STIFFNESS  CHEST: RESPY EFFORT NL, GOOD AE, NO W/R/C  HEART: S1S2+ REG, NO M/G/R  ABD: OBESE, SOFT, NT, NO HSM, BS+  EXT: NO EDEMA, NT, PULSES +. MILTON'S -VE  NEURO: ALERT AND ORIENTED X 3, NO MENINGEAL SIGNS, NO TREMORS, NL GAIT, NO FOCAL DEFICITS  PSYCH: FAIRLY GOOD AFFECT  BACK: NT, NO ROM, NO CVA TENDERNESS     PREVIOUS LABS REVIEWED AND D/W PT    ASSESSMENT / PLAN:     Diagnosis Orders   1. Mixed hyperlipidemia  COUNSELLED. IMPROVING - NOT AT GOAL  DEFERRED MED  ADVISED LOW FAT / CHOL DIET/ EXERCISE.  MONITOR. F/U LABS  GOALS D/W PT.  MAKE CHANGES AS NEEDED.       2. Primary hypertension  COUNSELLED. CONTROLLED. CONTINUE MEDS.   LOW NA+ / DASH DIET/ EXERCISE. MONITOR. GOAL </= 130/80  F/U LABS  MAKE CHANGES AS NEEDED.       3. Mild intermittent asthma without complication  COUNSELLED. CONTROLLED. ALBUTEROL PRN. MONITOR.  MONITOR FOR TRIGGERS- ENVIRONMENTAL MODIFICATION.  MAKE CHANGES AS NEEDED.       4. Vitamin D deficiency  COUNSELLED. ADVISED MED COMPLIANCE - ADVISED INCREASE VIT D TO 2000 U DAILY.  MONITOR AND MAKE CHANGES AS NEEDED.       5. Other allergic rhinitis  COUNSELLED. SYMPTOMATIC RX. MED PRN  MONITOR. MAKE CHANGES AS NEEDED.       6. Obesity (BMI 30-39.9)

## 2024-01-17 NOTE — PATIENT INSTRUCTIONS
TAKE MED AS ADVISED    DIET/ EXERCISE.    FOLLOW UP WITHIN 4 MONTHS / AS NEEDED    FOLLOW UP FOR FASTING LABS      [facility] Locations - No appointment necessary.  ? indicates the location is open Saturdays in addition to Monday through Friday.   Call your preferred location for test preparation, business hours and other information you need.   [facility] accepts all insurances.  CENTRAL  EAST  [place]    ? [place]   [address]   [address]   [place] [address]    Ph: [phone]  [facility]   [address] [place] [address]    Ph: [phone]   ? [place]   [address]    [place] [address]    Ph: [phone]     [facility]   [address] [place] [address]    Ph: [phone] ? [place]   [address] [place] [address]   Ph: [phone]  ? [facility]   [address] [place] [address]    Ph: [phone]      [place]   [address] [address]   Ph: [phone]    NORTH    ? [facility]   [address] [place] [address]    Ph: [phone]  [facility]   [address] [place] [address]   Ph: [phone]  [place]   [address] [place], [place] [address]vd.   [facility], [address]    PH: [phone]    [facility]   [address] [place] [address]    Ph: [phone]  [place]  [address] [place] [address]  Ph: [phone]  [facility]   [address] [place] [address]    Ph: [phone]

## 2024-01-25 ENCOUNTER — HOSPITAL ENCOUNTER (OUTPATIENT)
Dept: MAMMOGRAPHY | Age: 44
Discharge: HOME | End: 2024-01-25
Payer: COMMERCIAL

## 2024-01-25 VITALS — HEIGHT: 62 IN | BODY MASS INDEX: 31.1 KG/M2 | WEIGHT: 169 LBS

## 2024-01-25 DIAGNOSIS — Z12.31 ENCOUNTER FOR SCREENING MAMMOGRAM FOR MALIGNANT NEOPLASM OF BREAST: ICD-10-CM

## 2024-01-25 DIAGNOSIS — Z01.419 WOMEN'S ANNUAL ROUTINE GYNECOLOGICAL EXAMINATION: ICD-10-CM

## 2024-01-25 PROCEDURE — 77067 SCR MAMMO BI INCL CAD: CPT

## 2024-02-07 DIAGNOSIS — J45.20 MILD INTERMITTENT ASTHMA WITHOUT COMPLICATION: ICD-10-CM

## 2024-02-08 RX ORDER — ALBUTEROL SULFATE 90 UG/1
AEROSOL, METERED RESPIRATORY (INHALATION)
Qty: 8.5 G | Refills: 0 | Status: SHIPPED | OUTPATIENT
Start: 2024-02-08

## 2024-02-08 NOTE — TELEPHONE ENCOUNTER
Medication:   Requested Prescriptions     Pending Prescriptions Disp Refills    albuterol sulfate HFA (PROVENTIL;VENTOLIN;PROAIR) 108 (90 Base) MCG/ACT inhaler [Pharmacy Med Name: ALBUTEROL HFA INH (200 PUFFS) 8.5GM] 8.5 g      Sig: INHALE 2 PUFFS INTO THE LUNGS FOUR TIMES DAILY AS NEEDED FOR WHEEZING OR SHORTNESS OF BREATH        Last Filled:      Patient Phone Number: [phone] (home) [phone] (work)    Last appt: 1/17/2024   Next appt: 5/15/2024    Last OARRS:        No data to display

## 2024-03-15 NOTE — PROGRESS NOTES
Temp: 97.8F    Maternal emotional well being screening form completed and reviewed with patient. Current score is 1. Patient given referral to [address] [place] [address] ([phone]):  No
visit    2. Pre-existing hypertension during pregnancy, antepartum, unspecified pre-existing hypertension type  - BP wnl today, reports have been normal at home as well  - Aldomet 500mg BID  - NST reactive today, BPP 10/10  - [address] - [place] FETAL NON-STRESS TEST    3. Elderly primigravida in third trimester  - [address] - [place] FET[place] NON-STRESS TEST    4.  Anemia during pregnancy in third trimester     - Recommended for increased PO intake     - H/H at 28 weeks 11.4/34.2       Dispo: RTC on 5/17  Kirt Amor MD
negative...

## 2024-05-14 DIAGNOSIS — E55.9 VITAMIN D DEFICIENCY: ICD-10-CM

## 2024-05-14 DIAGNOSIS — E78.2 MIXED HYPERLIPIDEMIA: ICD-10-CM

## 2024-05-14 DIAGNOSIS — I10 PRIMARY HYPERTENSION: ICD-10-CM

## 2024-05-14 LAB
25(OH)D3 SERPL-MCNC: 38.6 NG/ML
ALBUMIN SERPL-MCNC: 4.2 G/DL (ref 3.4–5)
ALBUMIN/GLOB SERPL: 1.4 {RATIO} (ref 1.1–2.2)
ALP SERPL-CCNC: 47 U/L (ref 40–129)
ALT SERPL-CCNC: 10 U/L (ref 10–40)
ANION GAP SERPL CALCULATED.3IONS-SCNC: 14 MMOL/L (ref 3–16)
AST SERPL-CCNC: 13 U/L (ref 15–37)
BASOPHILS # BLD: 0.1 K/UL (ref 0–0.2)
BASOPHILS NFR BLD: 0.7 %
BILIRUB SERPL-MCNC: 0.3 MG/DL (ref 0–1)
BUN SERPL-MCNC: 10 MG/DL (ref 7–20)
CALCIUM SERPL-MCNC: 9.8 MG/DL (ref 8.3–10.6)
CHLORIDE SERPL-SCNC: 108 MMOL/L (ref 99–110)
CHOLEST SERPL-MCNC: 221 MG/DL (ref 0–199)
CO2 SERPL-SCNC: 21 MMOL/L (ref 21–32)
CREAT SERPL-MCNC: 0.8 MG/DL (ref 0.6–1.1)
DEPRECATED RDW RBC AUTO: 13.5 % (ref 12.4–15.4)
EOSINOPHIL # BLD: 0.2 K/UL (ref 0–0.6)
EOSINOPHIL NFR BLD: 2.6 %
GFR SERPLBLD CREATININE-BSD FMLA CKD-EPI: >90 ML/MIN/{1.73_M2}
GLUCOSE SERPL-MCNC: 89 MG/DL (ref 70–99)
HCT VFR BLD AUTO: 37 % (ref 36–48)
HDLC SERPL-MCNC: 71 MG/DL (ref 40–60)
HGB BLD-MCNC: 12.4 G/DL (ref 12–16)
LDLC SERPL CALC-MCNC: 129 MG/DL
LYMPHOCYTES # BLD: 3.1 K/UL (ref 1–5.1)
LYMPHOCYTES NFR BLD: 36.8 %
MCH RBC QN AUTO: 31.4 PG (ref 26–34)
MCHC RBC AUTO-ENTMCNC: 33.4 G/DL (ref 31–36)
MCV RBC AUTO: 93.9 FL (ref 80–100)
MONOCYTES # BLD: 0.4 K/UL (ref 0–1.3)
MONOCYTES NFR BLD: 4.8 %
NEUTROPHILS # BLD: 4.6 K/UL (ref 1.7–7.7)
NEUTROPHILS NFR BLD: 55.1 %
PLATELET # BLD AUTO: 409 K/UL (ref 135–450)
PMV BLD AUTO: 7.8 FL (ref 5–10.5)
POTASSIUM SERPL-SCNC: 4.1 MMOL/L (ref 3.5–5.1)
PROT SERPL-MCNC: 7.2 G/DL (ref 6.4–8.2)
RBC # BLD AUTO: 3.94 M/UL (ref 4–5.2)
SODIUM SERPL-SCNC: 143 MMOL/L (ref 136–145)
TRIGL SERPL-MCNC: 106 MG/DL (ref 0–150)
VLDLC SERPL CALC-MCNC: 21 MG/DL
WBC # BLD AUTO: 8.3 K/UL (ref 4–11)

## 2024-05-15 ENCOUNTER — OFFICE VISIT (OUTPATIENT)
Dept: INTERNAL MEDICINE CLINIC | Age: 44
End: 2024-05-15
Payer: COMMERCIAL

## 2024-05-15 VITALS
WEIGHT: 172 LBS | TEMPERATURE: 98.4 F | HEART RATE: 72 BPM | DIASTOLIC BLOOD PRESSURE: 80 MMHG | BODY MASS INDEX: 31.46 KG/M2 | SYSTOLIC BLOOD PRESSURE: 122 MMHG

## 2024-05-15 DIAGNOSIS — J30.89 OTHER ALLERGIC RHINITIS: ICD-10-CM

## 2024-05-15 DIAGNOSIS — E55.9 VITAMIN D DEFICIENCY: ICD-10-CM

## 2024-05-15 DIAGNOSIS — E66.9 OBESITY (BMI 30-39.9): ICD-10-CM

## 2024-05-15 DIAGNOSIS — I10 PRIMARY HYPERTENSION: Primary | ICD-10-CM

## 2024-05-15 DIAGNOSIS — E78.2 MIXED HYPERLIPIDEMIA: ICD-10-CM

## 2024-05-15 DIAGNOSIS — J45.20 MILD INTERMITTENT ASTHMA WITHOUT COMPLICATION: ICD-10-CM

## 2024-05-15 PROCEDURE — G8417 CALC BMI ABV UP PARAM F/U: HCPCS | Performed by: INTERNAL MEDICINE

## 2024-05-15 PROCEDURE — 3074F SYST BP LT 130 MM HG: CPT | Performed by: INTERNAL MEDICINE

## 2024-05-15 PROCEDURE — G8427 DOCREV CUR MEDS BY ELIG CLIN: HCPCS | Performed by: INTERNAL MEDICINE

## 2024-05-15 PROCEDURE — G2211 COMPLEX E/M VISIT ADD ON: HCPCS | Performed by: INTERNAL MEDICINE

## 2024-05-15 PROCEDURE — 99214 OFFICE O/P EST MOD 30 MIN: CPT | Performed by: INTERNAL MEDICINE

## 2024-05-15 PROCEDURE — 3078F DIAST BP <80 MM HG: CPT | Performed by: INTERNAL MEDICINE

## 2024-05-15 PROCEDURE — 1036F TOBACCO NON-USER: CPT | Performed by: INTERNAL MEDICINE

## 2024-05-15 RX ORDER — TELMISARTAN 20 MG/1
TABLET ORAL
Qty: 90 TABLET | Refills: 1 | Status: SHIPPED | OUTPATIENT
Start: 2024-05-15

## 2024-05-15 RX ORDER — ACETAMINOPHEN 160 MG
1 TABLET,DISINTEGRATING ORAL DAILY
Qty: 90 CAPSULE | Refills: 0 | Status: SHIPPED | OUTPATIENT
Start: 2024-05-15

## 2024-05-15 SDOH — ECONOMIC STABILITY: FOOD INSECURITY: WITHIN THE PAST 12 MONTHS, THE FOOD YOU BOUGHT JUST DIDN'T LAST AND YOU DIDN'T HAVE MONEY TO GET MORE.: NEVER TRUE

## 2024-05-15 SDOH — ECONOMIC STABILITY: FOOD INSECURITY: WITHIN THE PAST 12 MONTHS, YOU WORRIED THAT YOUR FOOD WOULD RUN OUT BEFORE YOU GOT MONEY TO BUY MORE.: NEVER TRUE

## 2024-05-15 SDOH — ECONOMIC STABILITY: INCOME INSECURITY: HOW HARD IS IT FOR YOU TO PAY FOR THE VERY BASICS LIKE FOOD, HOUSING, MEDICAL CARE, AND HEATING?: NOT HARD AT ALL

## 2024-05-15 ASSESSMENT — PATIENT HEALTH QUESTIONNAIRE - PHQ9
SUM OF ALL RESPONSES TO PHQ QUESTIONS 1-9: 0
1. LITTLE INTEREST OR PLEASURE IN DOING THINGS: NOT AT ALL
2. FEELING DOWN, DEPRESSED OR HOPELESS: NOT AT ALL
SUM OF ALL RESPONSES TO PHQ9 QUESTIONS 1 & 2: 0

## 2024-05-15 NOTE — PROGRESS NOTES
SUBJECTIVE:  Lorna Cuenca is a 44 y.o. female HERE FOR   Chief Complaint   Patient presents with    Hypertension      PT HERE FOR EVAL        HTN - TAKING MEDS. BP NOTED.  + DIET / EXERCISE COMPLIANCE. NO HEADACHE , DIZZINESS No.   HLP -  + EXERCISE / DIET COMPLIANCE.  NOT AT GOAL - LAST LABS D/W PT  ASTHMA - DENIES WHEEZING, NO SOB, NO RECENT INHALER USE   VIT D DEF - TAKING MED .  IMPROVED - LAB D/W PT  ALLERGIC RHINITIS -  OCC  NASAL CONGESTION , NO POSTNASAL DRAINAGE , NO SINUS PRESSURE, NO HA, + SNEEZING, NO WATERY ITCHY EYES.  OBESITY - DIET / EXERCISE REVIEWED. WT NOTED      DENIES CP,  No SOB, No PALPITATIONS, No COUGH, NO F/C  OCC ABD PAIN - NONE NOW, No N/V, NO DIARRHEA, No CONSTIPATION, No MELENA, No HEMATOCHEZIA.  No DYSURIA,  No FREQ, No URGENCY, No HEMATURIA    PMH: REVIEWED AND UPDATED TODAY    PSH: REVIEWED AND UPDATED TODAY    SOCIAL HX: REVIEWED AND UPDATED TODAY    FAMILY HX: REVIEWED AND UPDATED TODAY    ALLERGY:  Flector [diclofenac epolamine]    MEDS: REVIEWED  Prior to Visit Medications    Medication Sig Taking? Authorizing Provider   albuterol sulfate HFA (PROVENTIL;VENTOLIN;PROAIR) 108 (90 Base) MCG/ACT inhaler INHALE 2 PUFFS INTO THE LUNGS FOUR TIMES DAILY AS NEEDED FOR WHEEZING OR SHORTNESS OF BREATH Yes Meg Kimble MD   Cholecalciferol (VITAMIN D3) 50 MCG (2000 UT) CAPS Take 1 capsule by mouth daily Yes Meg Kimble MD   NIFEdipine (ADALAT CC) 60 MG extended release tablet TAKE 1 TABLET BY MOUTH ONE TIME A DAY Yes Meg Kimble MD   telmisartan (MICARDIS) 20 MG tablet TAKE 1 TABLET BY MOUTH ONE TIME A DAY Yes Meg Kimble MD   etonogestrel-ethinyl estradiol (ELURYNG) 0.12-0.015 MG/24HR vaginal ring INSERT 1 RING VAGINALLY EVERY 28 DAYS. LEAVE IN PLACE FOR 3 WEEKS, THEN REMOVE FOR 1 WEEK Yes Laxmi Mcmanus, DO   Blood Pressure KIT 1 Device by Does not apply route 2 times daily Dx: I10 Yes Laxmi Mcmanus, DO   cetirizine (ZYRTEC) 10 MG tablet Take 1

## 2024-06-17 NOTE — PROGRESS NOTES
chief complaint: Hand lesion    HPI:  Promise Nunez is a 64 year old female who presents today complaining of sudden blister or blot clot on the left  4th finger. This appeared 2 days ago and has not resolved yet. The bump is bluesih in color like a clot. Not painful until it is puked. Patients dad recently passed form clotting problems and she is very worried that this bump in her hand might be a clot. Denies sob, cp, leg pain or other signs of clots.    History obtained from the patient.    Problem List:  2018-12: Small bowel obstruction (H)  2016-07: Shoulder pain  2016-07: Greater tuberosity of humerus fracture  2015-05: Left knee pain  2015-02: Low back pain  2014-10: Advanced directives, counseling/discussion  2014-10: CARDIOVASCULAR SCREENING; LDL GOAL LESS THAN 160  2014-06: History of renal stone  2013-08: Esophageal reflux  2012-02: Tear of PCL (posterior cruciate ligament) of knee  2012-02: Knee pain      Past Medical History:   Diagnosis Date    Abnormal echocardiogram     She had CT coronary angiography which was normal    GERD (gastroesophageal reflux disease)     Screen for colon cancer 09/2021    nl       Social History     Tobacco Use    Smoking status: Never    Smokeless tobacco: Never   Substance Use Topics    Alcohol use: No     Alcohol/week: 0.0 standard drinks of alcohol       Review of systems  ROS negative except for pertinent positives listed in HPI      Vitals:    06/17/24 1655   BP: 114/64   Pulse: 84   Resp: 16   SpO2: 100%   Weight: 56.2 kg (124 lb)       Physical Exam  Constitutional: healthy, alert, and no distress  Head: Normocephalic.   Respiratory:unlabored respiratory effort  Psychiatric: mentation appears normal and affect normal/bright  Skin: About 0.2cm lesion noted on the palmar aspect of the right mid fourth finger. Lesion has a dark blue discoloration and painful when pushed on.     Assessment & Plan     Promise was seen today for finger.    Diagnoses and all orders for this  visit:      Skin lesion of hand  Seems benign, given that this is day 3, recommended watching closely for about a week and following up with PCP after. Discussed blot clots with patient, reassured that this will likely not cause a P.E. No concerning symptoms at this point, pt will follow up with PCP to consider screening for clotting disorders.     Decreased diastolic blood pressure  Per patient her baseline is know to be around 67, it is 64 today. Denies signs of hypotension or anything that has changed so far. Reccommended close follow up w PCP and discussed safety precautions.  .      At the end of the encounter, I discussed diagnosis, medications. Discussed red flags for immediate return to clinic/ER, as well as indications for follow up if no improvement. Patient understood and agreed to plan. Patient was stable for discharge.     WITH OB  MAKE CHANGES AS NEEDED. 6. Other allergic rhinitis  . COUUN. DEFERRED MED. READDRESS CLARITIN / ZYRTEC WITH OB  MAKE CHANGES AS NEEDED. 7. Essential hypertension  COUNSELLED. LOW NA+ / DASH DIET/ EXERCISE. MONITOR. GOAL </= 120/80  F/U LABS  MAKE CHANGES AS NEEDED. 8. Dyslipidemia  COUNSELLED. ADVISED LOW FAT / CHOL DIET/ EXERCISE.  MONITOR. F/U LABS  GOALS D/W PT.  MAKE CHANGES AS NEEDED. 9. Mild intermittent asthma without complication  COUNSELLED. STABLE. MONITOR  MONITOR FOR TRIGGERS- ENVIRONMENTAL MODIFICATION. Mandie Ramos MAKE CHANGES AS NEEDED. 10. Vitamin D deficiency  COUNSELLED. LABS TO [facility]  READDRESS MED WITH OB  MAKE CHANGES AS NEEDED. HAD FLU VACCINE AT [facility] received counseling on the following healthy behaviors: nutrition, exercise and medication adherence    Patient given educational materials on Hyperlipidemia, Nutrition, Exercise and Hypertension    I have instructed Lorna to complete a self tracking handout on Blood Pressures  and Weights and instructed them to bring it with them to her next appointment. Discussed use, benefit, and side effects of prescribed medications. Barriers to medication compliance addressed. All patient questions answered. Pt voiced understanding.                  MEDICATION SIDE EFFECTS D/W PATIENT    PT STABLE AT TIME OF D/C.    RETURN TO CLINIC POSTPARTUM / PRN    FOLLOW UP FOR FASTING LABS, OB

## 2024-08-07 ENCOUNTER — OFFICE VISIT (OUTPATIENT)
Dept: INTERNAL MEDICINE CLINIC | Age: 44
End: 2024-08-07
Payer: COMMERCIAL

## 2024-08-07 VITALS
HEART RATE: 81 BPM | SYSTOLIC BLOOD PRESSURE: 120 MMHG | HEIGHT: 62 IN | TEMPERATURE: 97.8 F | WEIGHT: 171 LBS | BODY MASS INDEX: 31.47 KG/M2 | DIASTOLIC BLOOD PRESSURE: 70 MMHG | OXYGEN SATURATION: 99 %

## 2024-08-07 DIAGNOSIS — G43.909 MIXED MIGRAINE AND MUSCLE CONTRACTION HEADACHE: Primary | ICD-10-CM

## 2024-08-07 DIAGNOSIS — G44.209 MIXED MIGRAINE AND MUSCLE CONTRACTION HEADACHE: Primary | ICD-10-CM

## 2024-08-07 DIAGNOSIS — F43.9 STRESS: ICD-10-CM

## 2024-08-07 PROBLEM — K57.30 DIVERTICULAR DISEASE OF COLON: Status: ACTIVE | Noted: 2024-08-07

## 2024-08-07 PROCEDURE — 3078F DIAST BP <80 MM HG: CPT | Performed by: STUDENT IN AN ORGANIZED HEALTH CARE EDUCATION/TRAINING PROGRAM

## 2024-08-07 PROCEDURE — 1036F TOBACCO NON-USER: CPT | Performed by: STUDENT IN AN ORGANIZED HEALTH CARE EDUCATION/TRAINING PROGRAM

## 2024-08-07 PROCEDURE — G8427 DOCREV CUR MEDS BY ELIG CLIN: HCPCS | Performed by: STUDENT IN AN ORGANIZED HEALTH CARE EDUCATION/TRAINING PROGRAM

## 2024-08-07 PROCEDURE — G8417 CALC BMI ABV UP PARAM F/U: HCPCS | Performed by: STUDENT IN AN ORGANIZED HEALTH CARE EDUCATION/TRAINING PROGRAM

## 2024-08-07 PROCEDURE — 99213 OFFICE O/P EST LOW 20 MIN: CPT | Performed by: STUDENT IN AN ORGANIZED HEALTH CARE EDUCATION/TRAINING PROGRAM

## 2024-08-07 PROCEDURE — 3074F SYST BP LT 130 MM HG: CPT | Performed by: STUDENT IN AN ORGANIZED HEALTH CARE EDUCATION/TRAINING PROGRAM

## 2024-08-07 RX ORDER — IBUPROFEN 200 MG
200 TABLET ORAL EVERY 6 HOURS PRN
Qty: 120 TABLET | Refills: 3 | Status: SHIPPED | OUTPATIENT
Start: 2024-08-07

## 2024-08-07 ASSESSMENT — ENCOUNTER SYMPTOMS
SWOLLEN GLANDS: 0
BLURRED VISION: 1
EYE PAIN: 0
VISUAL CHANGE: 0
RHINORRHEA: 0
FACIAL SWEATING: 0
EYE WATERING: 1
BACK PAIN: 0
VOMITING: 0
NAUSEA: 0
PHOTOPHOBIA: 1
COUGH: 0
SCALP TENDERNESS: 0
EYE REDNESS: 0
SORE THROAT: 0
SINUS PRESSURE: 0
ABDOMINAL PAIN: 0

## 2024-08-07 NOTE — PATIENT INSTRUCTIONS
Finding ways to destress   Therapy   Go for a walk or exercise ALONE     [email or web address]    F/u in 1 month or sooner as neededTake lowest effective dose of Ibuprofen at the onset of a headache pain     Start with Ibuprofen 400 mg using 200mg tabs, you can increase ibuprofen tabs by 200 mg every 6-8 hours. (Max ibuprofen dose 800mg no more than three times daily - as needed)    Once you find the lowest effective dose that manages your pain, schedule a follow up visit to discuss and adjust the medication dose as appropriate.

## 2024-08-07 NOTE — PROGRESS NOTES
[facility]  2024    Lorna Cuenca (:  1980) is a 44 y.o. female, here for evaluation of the following medical concerns:    Chief Complaint   Patient presents with    Migraine          Migraine   This is a new problem. The current episode started yesterday (AT 11:30AM). The problem occurs constantly. The problem has been unchanged (gradually worsened). The pain is located in the Right unilateral, occipital and retro-orbital region. The pain radiates to the face. The quality of the pain is described as throbbing, pulsating and band-like. The pain is at a severity of 7/10. Associated symptoms include anorexia (since onset), blurred vision (with looking at screen, bilateral, some tearing), eye watering, insomnia (toss and turn , no solid sleeping, not restful , gets 6-7 hours), neck pain (in posterior neck muscles), phonophobia and photophobia. Pertinent negatives include no abdominal pain, abnormal behavior, back pain, coughing, dizziness, drainage, ear pain, eye pain (sometime sharp pain in eyeball, no pain with movement), eye redness, facial sweating, fever, hearing loss, loss of balance, muscle aches, nausea, numbness, rhinorrhea, scalp tenderness, seizures, sinus pressure (sinus issues chronic), sore throat, swollen glands, tingling, tinnitus, visual change, vomiting, weakness or weight loss. The symptoms are aggravated by noise, OPCs, activity and menstrual cycle (bending down, uses the ring contraception). She has tried acetaminophen (oil in her temple, better transiently with rubbing lavendar oil) for the symptoms. The treatment provided no relief. Her past medical history is significant for hypertension (gestational , and current on meds) and obesity. There is no history of cancer, cluster headaches, immunosuppression, migraine headaches, migraines in the family, recent head traumas, sinus disease (no recent exacerbations) or TMJ.       Side of right face, bandlike around lower neck,

## 2024-09-17 ENCOUNTER — TELEPHONE (OUTPATIENT)
Dept: OBGYN CLINIC | Age: 44
End: 2024-09-17

## 2024-10-29 ENCOUNTER — HOSPITAL ENCOUNTER (OUTPATIENT)
Dept: OCCUPATIONAL THERAPY | Age: 44
Setting detail: THERAPIES SERIES
Discharge: HOME | End: 2024-10-29
Payer: COMMERCIAL

## 2024-10-29 PROCEDURE — 97537 COMMUNITY/WORK REINTEGRATION: CPT

## 2024-10-29 PROCEDURE — 97165 OT EVAL LOW COMPLEX 30 MIN: CPT

## 2024-10-29 NOTE — PROGRESS NOTES
Outpatient Occupational Therapy  [] [facility]   Phone: [phone]   Fax: [phone]   [x] [facility]  Phone: [phone]   Fax: [phone]  [] [place]   Phone: [phone]   Fax: [phone]      To:  Dr. Westbrook      Patient: Lorna Cuenca  : 1980  MRN: [id number]  Evaluation Date: 10/29/2024      Diagnosis Information: CVA            Occupational Therapy Certification/Re-Certification Form  Dear Dr. Westbrook,  The following patient has been evaluated for occupational therapy services and for therapy to continue, Medicare requires monthly physician review of the treatment plan. Please review the attached evaluation and/or summary of the patient's plan of care, and verify that you agree therapy should continue by signing the attached document and sending it back to our office.    Plan of Care/Treatment to date:  [] Therapeutic Exercise   [] Modalities:  [] Therapeutic Activity    [] Ultrasound  [] Electrical Stimulation   [] Activities of Daily Living    [] Fluidotherapy [] Kinesiotaping  [] Neuromuscular Re-education   [] Iontophoresis [] Coldpack/hotpack   [] Instruction in HEP     [] Contrast Bath  [] Manual Therapy     Other:  [] Aquatic Therapy      [x] Resume driving      Frequency/Duration:  # Days per week: [x] 1 day # Weeks: [x] 1 week [] 5 weeks      [] 2 days   [] 2 weeks [] 6 weeks     [] 3 days   [] 3 weeks [] 7 weeks     [] 4 days   [] 4 weeks [] 8 weeks    Rehab Potential: [] excellent [x] good [] fair  [] poor       Electronically signed by:  DANG Garvin, NORBERTO      If you have any questions or concerns, please don't hesitate to call.  Thank you for your referral.      Physician Signature:________________________________Date:__________________  By signing above, therapist’s plan is approved by physician      
10/29/2024    Dear Dr. Westbrook,      Lorna Cuenca (MR# [id number]) was seen by Occupational Therapy for an in clinic ’s evaluation at [facility] and for an on the 's evaluation at [facility].  As you know, Mrs. Cuenca suffered a CVA.  She wants to resume driving to be independent in community mobility, work, and leisure skills.    Mrs. Cuenca passed tests for visual acuity, saccades, pursuits, depth perception, color vision, peripheral vision, and recognition of traffic signs and signals.  She was administered the Motor Free Visual Perceptual Test and scored within normal limits.  She was administered the Trails Making Tests Part A and B which are cognitive tests that involve scanning, speed of mental processing, visual motor sequencing, as well as switching cognitive sets.  On Part A, she scored below normal limits with 63 seconds over a norm of 60 seconds and on Part B, she scored within normal limits with 76 seconds over the norm of 180 seconds.  She demonstrated functional physical capacity for driving.    Behind the wheel, Mrs. Cuenca was able to manipulate all vehicle controls.  She drove on secondary and primary roads in light to moderately heavy traffic.  She demonstrated the ability to park, back up, maintain speed and traffic flow, change lanes and follow directions.  Responses to situations encountered were appropriate.      Based on the results of this evaluation, it appears that Mrs. Cuenca is a suitable candidate to resume driving.  She is as safe as the driving public.  The final decision remains with the physician.  Please inform Mrs. Cuenca of your decision.  I can be reached at [phone] if questions arise.  Thank you for this referral.    Sincerely,      Andrzej Buchanan, DANG, LDI   Certified  Rehabilitation Specialist          
identified      RECOMMENDATIONS: Resume driving.        KIKI Garvin, CDRS, LDI   Certified  Rehabilitation Specialist

## (undated) DEVICE — 3M™ STERI-STRIP™ COMPOUND BENZOIN TINCTURE 40 BAGS/CARTON 4 CARTONS/CASE C1544: Brand: 3M™ STERI-STRIP™

## (undated) DEVICE — SUTURE VCRL SZ 0 L36IN ABSRB UD L36MM CT-1 1/2 CIR J946H

## (undated) DEVICE — BLADE CLIPPER GEN PURP NS

## (undated) DEVICE — SUTURE VCRL SZ 1 L36IN ABSRB VLT L36MM CT-1 1/2 CIR J347H

## (undated) DEVICE — SUTURE MCRYL SZ 3-0 L27IN ABSRB UD L60MM KS STR REV CUT Y523H

## (undated) DEVICE — CHLORAPREP 26ML ORANGE

## (undated) DEVICE — GLOVE SURG SZ 65 THK91MIL LTX FREE SYN POLYISOPRENE

## (undated) DEVICE — SOLUTION IV IRRIG POUR BRL 0.9% SODIUM CHL 2F7124

## (undated) DEVICE — Device

## (undated) DEVICE — PAD,NON-ADHERENT,3X8,STERILE,LF,1/PK: Brand: MEDLINE

## (undated) DEVICE — TRAY URIN CATH 16FR DRNGE BG STATLOK STBL DEV F SURSTP

## (undated) DEVICE — GARMENT COMPR L FOR 23IN CALF FLOTRN

## (undated) DEVICE — DRESSING COMP IS W4XL10IN PD W2XL8IN CNTCT LAYR ADH

## (undated) DEVICE — S/USE RESUS KIT W/O MASK (10): Brand: FISHER & PAYKEL HEALTHCARE

## (undated) DEVICE — SUTURE ABSORBABLE BRAIDED 2-0 CT-1 27 IN UD VICRYL J259H